# Patient Record
Sex: FEMALE | Race: WHITE | NOT HISPANIC OR LATINO | Employment: OTHER | URBAN - METROPOLITAN AREA
[De-identification: names, ages, dates, MRNs, and addresses within clinical notes are randomized per-mention and may not be internally consistent; named-entity substitution may affect disease eponyms.]

---

## 2017-01-23 ENCOUNTER — APPOINTMENT (OUTPATIENT)
Dept: LAB | Age: 82
End: 2017-01-23
Payer: MEDICARE

## 2017-01-23 ENCOUNTER — TRANSCRIBE ORDERS (OUTPATIENT)
Dept: ADMINISTRATIVE | Age: 82
End: 2017-01-23

## 2017-01-23 DIAGNOSIS — E03.9 HYPOTHYROIDISM: ICD-10-CM

## 2017-01-23 DIAGNOSIS — R53.83 OTHER FATIGUE: ICD-10-CM

## 2017-01-23 LAB
ALBUMIN SERPL BCP-MCNC: 3.5 G/DL (ref 3.5–5)
ALP SERPL-CCNC: 69 U/L (ref 46–116)
ALT SERPL W P-5'-P-CCNC: 20 U/L (ref 12–78)
ANION GAP SERPL CALCULATED.3IONS-SCNC: 7 MMOL/L (ref 4–13)
AST SERPL W P-5'-P-CCNC: 15 U/L (ref 5–45)
BASOPHILS # BLD AUTO: 0.03 THOUSANDS/ΜL (ref 0–0.1)
BASOPHILS NFR BLD AUTO: 1 % (ref 0–1)
BILIRUB SERPL-MCNC: 0.7 MG/DL (ref 0.2–1)
BUN SERPL-MCNC: 15 MG/DL (ref 5–25)
CALCIUM SERPL-MCNC: 8.7 MG/DL (ref 8.3–10.1)
CHLORIDE SERPL-SCNC: 105 MMOL/L (ref 100–108)
CO2 SERPL-SCNC: 29 MMOL/L (ref 21–32)
CREAT SERPL-MCNC: 0.88 MG/DL (ref 0.6–1.3)
EOSINOPHIL # BLD AUTO: 0.31 THOUSAND/ΜL (ref 0–0.61)
EOSINOPHIL NFR BLD AUTO: 5 % (ref 0–6)
ERYTHROCYTE [DISTWIDTH] IN BLOOD BY AUTOMATED COUNT: 12.8 % (ref 11.6–15.1)
GFR SERPL CREATININE-BSD FRML MDRD: >60 ML/MIN/1.73SQ M
GLUCOSE SERPL-MCNC: 95 MG/DL (ref 65–140)
HCT VFR BLD AUTO: 43.3 % (ref 34.8–46.1)
HGB BLD-MCNC: 14.7 G/DL (ref 11.5–15.4)
LYMPHOCYTES # BLD AUTO: 2.08 THOUSANDS/ΜL (ref 0.6–4.47)
LYMPHOCYTES NFR BLD AUTO: 36 % (ref 14–44)
MCH RBC QN AUTO: 31.7 PG (ref 26.8–34.3)
MCHC RBC AUTO-ENTMCNC: 33.9 G/DL (ref 31.4–37.4)
MCV RBC AUTO: 94 FL (ref 82–98)
MONOCYTES # BLD AUTO: 0.47 THOUSAND/ΜL (ref 0.17–1.22)
MONOCYTES NFR BLD AUTO: 8 % (ref 4–12)
NEUTROPHILS # BLD AUTO: 2.9 THOUSANDS/ΜL (ref 1.85–7.62)
NEUTS SEG NFR BLD AUTO: 50 % (ref 43–75)
NRBC BLD AUTO-RTO: 0 /100 WBCS
PLATELET # BLD AUTO: 276 THOUSANDS/UL (ref 149–390)
PMV BLD AUTO: 10.5 FL (ref 8.9–12.7)
POTASSIUM SERPL-SCNC: 4 MMOL/L (ref 3.5–5.3)
PROT SERPL-MCNC: 7 G/DL (ref 6.4–8.2)
RBC # BLD AUTO: 4.63 MILLION/UL (ref 3.81–5.12)
SODIUM SERPL-SCNC: 141 MMOL/L (ref 136–145)
TSH SERPL DL<=0.05 MIU/L-ACNC: 1.38 UIU/ML (ref 0.36–3.74)
WBC # BLD AUTO: 5.8 THOUSAND/UL (ref 4.31–10.16)

## 2017-01-23 PROCEDURE — 85025 COMPLETE CBC W/AUTO DIFF WBC: CPT

## 2017-01-23 PROCEDURE — 80053 COMPREHEN METABOLIC PANEL: CPT

## 2017-01-23 PROCEDURE — 36415 COLL VENOUS BLD VENIPUNCTURE: CPT

## 2017-01-23 PROCEDURE — 84443 ASSAY THYROID STIM HORMONE: CPT

## 2017-02-01 ENCOUNTER — ALLSCRIPTS OFFICE VISIT (OUTPATIENT)
Dept: OTHER | Facility: OTHER | Age: 82
End: 2017-02-01

## 2017-04-25 ENCOUNTER — ALLSCRIPTS OFFICE VISIT (OUTPATIENT)
Dept: OTHER | Facility: OTHER | Age: 82
End: 2017-04-25

## 2017-06-19 DIAGNOSIS — R53.83 OTHER FATIGUE: ICD-10-CM

## 2017-06-19 DIAGNOSIS — M81.0 AGE-RELATED OSTEOPOROSIS WITHOUT CURRENT PATHOLOGICAL FRACTURE: ICD-10-CM

## 2017-06-20 ENCOUNTER — TRANSCRIBE ORDERS (OUTPATIENT)
Dept: ADMINISTRATIVE | Age: 82
End: 2017-06-20

## 2017-06-20 ENCOUNTER — APPOINTMENT (OUTPATIENT)
Dept: LAB | Age: 82
End: 2017-06-20
Payer: MEDICARE

## 2017-06-20 DIAGNOSIS — M81.0 AGE-RELATED OSTEOPOROSIS WITHOUT CURRENT PATHOLOGICAL FRACTURE: ICD-10-CM

## 2017-06-20 DIAGNOSIS — R53.83 OTHER FATIGUE: ICD-10-CM

## 2017-06-20 LAB
25(OH)D3 SERPL-MCNC: 20.4 NG/ML (ref 30–100)
ALBUMIN SERPL BCP-MCNC: 3.3 G/DL (ref 3.5–5)
ALP SERPL-CCNC: 74 U/L (ref 46–116)
ALT SERPL W P-5'-P-CCNC: 19 U/L (ref 12–78)
ANION GAP SERPL CALCULATED.3IONS-SCNC: 5 MMOL/L (ref 4–13)
AST SERPL W P-5'-P-CCNC: 15 U/L (ref 5–45)
BASOPHILS # BLD AUTO: 0.02 THOUSANDS/ΜL (ref 0–0.1)
BASOPHILS NFR BLD AUTO: 0 % (ref 0–1)
BILIRUB SERPL-MCNC: 0.45 MG/DL (ref 0.2–1)
BUN SERPL-MCNC: 14 MG/DL (ref 5–25)
CALCIUM SERPL-MCNC: 8.7 MG/DL (ref 8.3–10.1)
CHLORIDE SERPL-SCNC: 108 MMOL/L (ref 100–108)
CO2 SERPL-SCNC: 29 MMOL/L (ref 21–32)
CREAT SERPL-MCNC: 0.8 MG/DL (ref 0.6–1.3)
EOSINOPHIL # BLD AUTO: 0.26 THOUSAND/ΜL (ref 0–0.61)
EOSINOPHIL NFR BLD AUTO: 4 % (ref 0–6)
ERYTHROCYTE [DISTWIDTH] IN BLOOD BY AUTOMATED COUNT: 13.1 % (ref 11.6–15.1)
GFR SERPL CREATININE-BSD FRML MDRD: >60 ML/MIN/1.73SQ M
GLUCOSE P FAST SERPL-MCNC: 96 MG/DL (ref 65–99)
HCT VFR BLD AUTO: 42.9 % (ref 34.8–46.1)
HGB BLD-MCNC: 14.5 G/DL (ref 11.5–15.4)
LYMPHOCYTES # BLD AUTO: 2.1 THOUSANDS/ΜL (ref 0.6–4.47)
LYMPHOCYTES NFR BLD AUTO: 32 % (ref 14–44)
MCH RBC QN AUTO: 32.3 PG (ref 26.8–34.3)
MCHC RBC AUTO-ENTMCNC: 33.8 G/DL (ref 31.4–37.4)
MCV RBC AUTO: 96 FL (ref 82–98)
MONOCYTES # BLD AUTO: 0.67 THOUSAND/ΜL (ref 0.17–1.22)
MONOCYTES NFR BLD AUTO: 10 % (ref 4–12)
NEUTROPHILS # BLD AUTO: 3.51 THOUSANDS/ΜL (ref 1.85–7.62)
NEUTS SEG NFR BLD AUTO: 54 % (ref 43–75)
NRBC BLD AUTO-RTO: 0 /100 WBCS
PLATELET # BLD AUTO: 265 THOUSANDS/UL (ref 149–390)
PMV BLD AUTO: 10.2 FL (ref 8.9–12.7)
POTASSIUM SERPL-SCNC: 4.4 MMOL/L (ref 3.5–5.3)
PROT SERPL-MCNC: 6.8 G/DL (ref 6.4–8.2)
RBC # BLD AUTO: 4.49 MILLION/UL (ref 3.81–5.12)
SODIUM SERPL-SCNC: 142 MMOL/L (ref 136–145)
TSH SERPL DL<=0.05 MIU/L-ACNC: 1.04 UIU/ML (ref 0.36–3.74)
WBC # BLD AUTO: 6.58 THOUSAND/UL (ref 4.31–10.16)

## 2017-06-20 PROCEDURE — 80053 COMPREHEN METABOLIC PANEL: CPT

## 2017-06-20 PROCEDURE — 82306 VITAMIN D 25 HYDROXY: CPT

## 2017-06-20 PROCEDURE — 36415 COLL VENOUS BLD VENIPUNCTURE: CPT

## 2017-06-20 PROCEDURE — 84443 ASSAY THYROID STIM HORMONE: CPT

## 2017-06-20 PROCEDURE — 85025 COMPLETE CBC W/AUTO DIFF WBC: CPT

## 2017-06-27 ENCOUNTER — ALLSCRIPTS OFFICE VISIT (OUTPATIENT)
Dept: OTHER | Facility: OTHER | Age: 82
End: 2017-06-27

## 2017-07-17 ENCOUNTER — GENERIC CONVERSION - ENCOUNTER (OUTPATIENT)
Dept: OTHER | Facility: OTHER | Age: 82
End: 2017-07-17

## 2017-08-28 ENCOUNTER — ALLSCRIPTS OFFICE VISIT (OUTPATIENT)
Dept: OTHER | Facility: OTHER | Age: 82
End: 2017-08-28

## 2017-10-24 NOTE — PROGRESS NOTES
Assessment  1  Depression with anxiety (300 4) (F41 8)  2  Tremor (781 0) (R25 1)  3  Parkinson's disease (332 0) (G20)  4  Hypothyroidism (244 9) (E03 9)  5  Lumbar radiculopathy (724 4) (M54 16)  6  Hyperlipidemia (272 4) (E78 5)  7  Arthritis (716 90) (M19 90)     #1 tremor-clinically appears to represent Parkinson's  Treatment warranted  She will begin carbidopa levodopa 25/100 twice daily and an empty stomach  She knows she may have some nausea  May need 3 times a day dosing  May need referral to dermatology  #2 low back pain with lumbar radiculopathy symptoms-clinically suspicious for lumbar stenosis  No response to physical therapy  She will use a Medrol Dosepak and if unimproved and repeat the pack  If still unimproved will need MRI of lumbar spine with preprocedure anxiolytic followed by evaluation by neurosurgery-she wants Dr Nancy Ruby  #3 depression with exacerbation-I increased her Lexapro to 10 mg daily  Number 4:15-much of this from her depression  All screening labs stable  #5 osteoporosis-had received 2 doses of IV bisphosphonates with her last dose in December 2013  Recent DEXA scan shows lumbar spine false positive and hip of -2 4  She refuses other treatment  Subsequent bone density study due November 2017 with urine for N-telopeptide  Pending results he is now agreeable to additional treatment  On supplemental vitamin D for low level  #6 low vitamin D level-continue supplementation  #7 GI symptoms-has a history of intermittent abdominal pain  CAT scan benign  All labs normal  Endoscopy normal other than gastritis-esophagitis  MRCP showed no common duct stone  Gastric emptying study very abnormal  In the past was on low-dose Reglan a PPI  This point back on a PPI and overall stable  Try not to use Reglan with her history of depression   It has increasing symptoms may be candidate for domperidone  #8 shortness of breath-only with extreme activity that she will call should the pattern change  #9 rhinitis-allergic versus perennial-uses Flonase nasal spray  #10 myalgias-resolved with DC of aromatase inhibitor  #11 breast carcinoma-[place] prior discussion  5 mammogram stable  See surgery and follow up  #12 left lobe thyroid nodule-had prior aspiration  Follow-up ultrasound shows multiple nodules as before surgery feels no other intervention is needed  She sees surgery yearly  Recent TSH normal    All other problems as per note of July 2014, December 2009, August 2000, 2002, 2004    MEDICAL REGIMEN: Medrol Dosepak-repeat if needed, carbidopa levodopa 25/100 twice daily and an empty stomach, Lexapro 10 mg daily, rare use of lorazepam 0 5 mg twice a day when necessary, Citracal D daily, vitamin D 3/2000 units daily, baby aspirin daily, omeprazole 20 mg daily, acetaminophen when necessary    Appointment in 2 months  Call if no response to steroids and we will lengthy MRI of L-spine and referral to neurosurgeon    Addendum-patient called the office on September the fifth-he stated back-leg pain improved on Medrol but now recurring  She will repeat a Medrol Dosepak  Also with some dyspepsia  This may be from the steroids  Watching carefully to make sure it is not from levodopa  Call in one week regarding status            Plan  Arthritis    · Start: MethylPREDNISolone 4 MG Oral Tablet Therapy Pack (Medrol); use as directed  Arthritis, Osteoporosis    · Start: Carbidopa-Levodopa  MG Oral Tablet; 1 by mouth at 10AM and 1 po at 8 PM    Increase generic Lexapro to 10 mg daily  Begin carbidopa levodopa-25/100-1 tablet around 10 AM and one around 8 PM   Medrol Dosepak as directed  May need to repeat  If unresponsive will need MRI with sedation an appointment with neurosurgery  Other meds to say the same  Call office if noting any chest pain or pressure with activity  Call office if noting any weakness of one arm or leg compared to the other    Call office if noting any numbness of one arm or leg compared to the other   Call office if noting any blurred or double vision     History of Present Illness  HPI: We reviewed multiple issues  Her daughter was with her today and we conversed as well  She says she is very frustrated that her new assisted living facility and is increasingly depressed  She said the other day it was so bad I took a whole Lexapro  She is unhappy with her living situation as well as her multiple medical problems as listed below  She has a long-standing history of anxiety depression and has been on low-dose SSRI  I feel at this point she does need higher dose  She is currently on Lexapro 5 mg daily and will increase to 10 mg daily  This patient denies any systemic symptoms  Specifically there has been no evidence of fever, night sweats, significant weight loss or significant decrease in appetite  is having increasing back pain  She notes pain starting in the back and radiating down the right leg to the area the knee  Denies definite numbness or tingling  She says that some days it is extreme and some days it is not  She had physical therapy with minimal response  She said when she walks with a walker at a slightly better  She clearly feels better walking hunched over  We reviewed the likely she has clinical lumbar stenosis  I made a drawing of the anatomy and she appears to understand  I recommended a brief course of oral corticosteroids watching her psychiatric history which may need to be repeated  If unresponsive she will need an MRI potential for a little neurosurgery? they prefer Dr [facility]  We went over this in detail  This is a 45 minute visit with more than 50% of the time spent counseling the patient formerly and treatment plan as well as attention questions regarding the above  As noted we fabiola the anatomy  feel her Parkinson's is becoming more of an issue  She has a tremor? predominantly right arm with slight left arm  She also some tremor of her mouth   She feels as though it is not getting in the way  I explained to her however treating this may help her mobility  I recommended she go on carbidopa levodopa 25/100 twice daily  She may need 3 times a day  He reviewed that this needs to be taken on an empty stomach  She will take a dose around 10 AM and another dose around 8 PM   has known hypothyroidism  This patient has a known history of hypothyroidism  We reviewed the difference between brand and generic thyroid supplements  We reviewed that thyroid supplements need to be taken on an empty stomach  We reviewed recent literature showing that the thyroid supplement can be taken in the morning on an empty stomach or before going to bed on an empty stomach  This patient denies any symptoms of hypothyroidism including significant constipation, dry skin or worsening fatigue  Periodic monitoring of this patient's free T4 and TSH will continue to be done  She is known osteoporosis and is potentially open to additional treatment of this in the future  denies any weakness of one arm or leg compared to the other  She denies any numbness of one arm or leg compared to the other  She denies blurred or double vision or difficulty with her speech  patient denies any systemic symptoms  Specifically there has been no evidence of fever, night sweats, significant weight loss or significant decrease in appetite  Review of Systems  Complete-Female:   Constitutional: feeling poorly-- and-- feeling tired, but-- no fever-- and-- no chills  Eyes: No complaints of eye pain, no red eyes, no eyesight problems, no discharge, no dry eyes, no itching of eyes  ENT: no complaints of earache, no loss of hearing, no nose bleeds, no nasal discharge, no sore throat, no hoarseness  Cardiovascular: No complaints of slow heart rate, no fast heart rate, no chest pain, no palpitations, no leg claudication, no lower extremity edema     Respiratory: shortness of breath-- and-- shortness of breath during exertion, but-- no cough,-- no orthopnea,-- no wheezing-- and-- no PND  Gastrointestinal: No complaints of abdominal pain, no constipation, no nausea or vomiting, no diarrhea, no bloody stools  Genitourinary: No complaints of dysuria, no incontinence, no pelvic pain, no dysmenorrhea, no vaginal discharge or bleeding  Musculoskeletal: arthralgias-- and-- Low back pain as before, but-- no joint swelling,-- no limb pain,-- no myalgias,-- no joint stiffness-- and-- no limb swelling  Integumentary: No complaints of skin rash or lesions, no itching, no skin wounds, no breast pain or lump  Neurological: Tremor, but-- no headache,-- no numbness,-- no tingling,-- no confusion,-- no dizziness,-- no limb weakness,-- no convulsions,-- no fainting-- and-- no difficulty walking  Psychiatric: sleep disturbances-- and-- depression, but-- not suicidal,-- no anxiety,-- no personality change-- and-- no emotional problems  Endocrine: No complaints of proptosis, no hot flashes, no muscle weakness, no deepening of the voice, no feelings of weakness  Hematologic/Lymphatic: No complaints of swollen glands, no swollen glands in the neck, does not bleed easily, does not bruise easily  Active Problems  1  Abdominal pain (789 00) (R10 9)  2  Anemia (285 9) (D64 9)  3  Arthritis (716 90) (M19 90)  4  Atherosclerosis (440 9) (I70 90)  5  Breast cancer (174 9) (C50 919)  6  Depression (311) (F32 9)  7  Depression with anxiety (300 4) (F41 8)  8  Dyspnea (786 09) (R06 00)  9  Esophageal reflux (530 81) (K21 9)  10  Fatigue (780 79) (R53 83)  11  Gastroparesis (536 3) (K31 84)  12  Hoarseness (784 42) (R49 0)  13  Hyperlipidemia (272 4) (E78 5)  14  Hypertension (401 9) (I10)  15  Hypothyroidism (244 9) (E03 9)  16  Lumbar radiculopathy (724 4) (M54 16)  17  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)  18  Osteoporosis (733 00) (M81 0)  19  Rhinitis (472 0) (J31 0)  20  Tremor (781 0) (R25 1)  21   Vitamin D deficiency (268 9) (E55 9)    Past Medical History  1  History of Atherosclerosis (440 9) (I70 90)  2  History of Breast Cancer (V10 3)  3  History of Gastroparesis (536 3) (K31 84)  4  History of Long term use of drug (V58 69) (Z79 899)  5  History of Malignant neoplasm of breast, unspecified laterality  6  History of Osteoarthritis (V13 4)  7  History of Osteomalacia (268 2)  Active Problems And Past Medical History Reviewed: The active problems and past medical history were reviewed and updated today  Surgical History  Surgical History Reviewed: The surgical history was reviewed and updated today  Family History  Mother   1  Family history of Atherosclerosis (V17 49)  Family History   2  Family history of Atherosclerosis (V17 49)  3  Family history of Hyperlipidemia  4  Family history of Osteoarthritis (V17 7)  Family History Reviewed: The family history was reviewed and updated today  Social History   · Alcohol Use (History)   · Being A Social Drinker   · Denied: History of Drug Use   · Never A Smoker  Social History Reviewed: The social history was reviewed and updated today  The social history was reviewed and is unchanged  Current Meds  1  Advil CAPS; TAKE 2 CAPSULE 3 times daily PRN; Therapy: (Recorded:[id number]) to Recorded  2  Aspirin 81 MG TABS; TAKE 1 TABLET DAILY; Therapy: (Recorded:35Bce1581) to Recorded  3  Escitalopram Oxalate 10 MG Oral Tablet; take 1 tablet by mouth one time daily; Therapy: [id number] to (Evaluate:29Jun2018)  Requested for: 35Dpk9924; Last Rx:[id number]   Ordered  4  LORazepam 0 5 MG Oral Tablet; TAKE 1 TABLET TWICE DAILY; Therapy: (Recorded:95Vwz7158) to Recorded  5  Omeprazole 20 MG Oral Capsule Delayed Release; TAKE 1 CAPSULE DAILY; Therapy: 27Apr2015 to (Evaluate:21Apr2016) Recorded  6  Vitamin D3 2000 UNIT Oral Capsule; TAKE 1 CAPSULE Daily; Therapy: (Recorded:68Rgz7096) to Recorded  Medication List Reviewed: The medication list was reviewed and updated today  Allergies  1  No Known Drug Allergies    Vitals  Vital Signs    Recorded: 90Lov9219 06:12PM Recorded: 28Aug2017 03:24PM   Heart Rate 68    Respiration 14    Systolic 935    Diastolic 78    Height  5 ft 3 in   Weight  168 lb    BMI Calculated  29 76   BSA Calculated  1 8     Physical Exam    Constitutional   General appearance: No acute distress, well appearing and well nourished  Eyes   Conjunctiva and lids: No swelling, erythema or discharge  Pupils and irises: Equal, round, reactive to light  Ophthalmoscopic examination: Normal fundi and optic discs  Ears, Nose, Mouth, and Throat   External inspection of ears and nose: Normal     Otoscopic examination: Tympanic membranes translucent with normal light reflex  Canals patent without erythema  Hearing: Normal     Nasal mucosa, septum, and turbinates: Normal without edema or erythema  Lips, teeth, and gums: Normal, good dentition  Oropharynx: Normal with no erythema, edema, exudate or lesions  Neck   Neck: Supple, symmetric, trachea midline, no masses  Thyroid: Normal, no thyromegaly  Pulmonary   Respiratory effort: No increased work of breathing or signs of respiratory distress  Percussion of chest: Normal     Palpation of chest: Normal     Auscultation of lungs: Clear to auscultation  Cardiovascular   Palpation of heart: Normal PMI, no thrills  Auscultation of heart: Normal rate and rhythm, normal S1 and S2, no murmurs  Carotid pulses: 2+ bilaterally  Abdominal aorta: Normal     Femoral pulses: 2+ bilaterally  Pedal pulses: 2+ bilaterally  Examination of extremities for edema and/or varicosities: Normal     Chest   Breasts: Normal, no dimpling or skin changes appreciated  Palpation of breasts and axillae: Normal, no masses palpated  Abdomen   Abdomen: Non-tender, no masses  Liver and spleen: No hepatomegaly or splenomegaly  Examination for hernias: No hernia appreciated      Anus, perineum, and rectum: Normal sphincter tone, no masses, no prolapse  Stool sample for occult blood: Negative  Genitourinary   External genitalia and vagina: Normal, no lesions appreciated  Urethra: Normal, no discharge  Bladder: Not distended, no tenderness  Cervix: Normal, no lesions  Uterus: Normal size, no tenderness, no masses  Adnexa/Parametria: Normal, no masses or tenderness  Lymphatic   Palpation of lymph nodes in neck: No lymphadenopathy  Palpation of lymph nodes in axillae: No lymphadenopathy  Palpation of lymph nodes in groin: No lymphadenopathy  Palpation of lymph nodes in other areas: No lymphadenopathy  Musculoskeletal   Gait and station: Normal     Digits and nails: Normal without clubbing or cyanosis  Joints, bones, and muscles: Normal     Range of motion: Normal     Stability: Normal     Muscle strength/tone: Normal     Skin   Skin and subcutaneous tissue: Normal without rashes or lesions  Palpation of skin and subcutaneous tissue: Normal turgor  Neurologic   Cranial nerves: Cranial nerves II-XII intact  Cortical function: Normal mental status  Reflexes: 2+ and symmetric  Sensation: No sensory loss  Coordination: Abnormal  -- Mild resting tremor?partially pill-rolling  Decreased with activity  Psychiatric   Judgment and insight: Normal     Orientation to person, place, and time: Normal     Recent and remote memory: Intact  Mood and affect: Normal        Future Appointments    Date/Time Provider Specialty Site   11/14/2017 10:00 AM ISAAC Neal   Internal Medicine [place] Estrada MD     Signatures   Electronically signed by : ISAAC Barrett ; Aug 28 2017  6:22PM EST                       (Author)    Electronically signed by : ISAAC Barrett ; Sep  6 2017  9:10AM EST                       (Author)

## 2017-10-27 ENCOUNTER — GENERIC CONVERSION - ENCOUNTER (OUTPATIENT)
Dept: OTHER | Facility: OTHER | Age: 82
End: 2017-10-27

## 2017-11-01 ENCOUNTER — GENERIC CONVERSION - ENCOUNTER (OUTPATIENT)
Dept: OTHER | Facility: OTHER | Age: 82
End: 2017-11-01

## 2017-11-14 ENCOUNTER — ALLSCRIPTS OFFICE VISIT (OUTPATIENT)
Dept: OTHER | Facility: OTHER | Age: 82
End: 2017-11-14

## 2017-11-15 NOTE — PROGRESS NOTES
Assessment    1  Depression with anxiety (300 4) (F41 8)   2  Parkinson's disease (332 0) (G20)   3  Lumbar radiculopathy (724 4) (M54 16)   4  Hypothyroidism (244 9) (E03 9)   5  Hypertension (401 9) (I10)   6  Tremor (781 0) (R25 1)  1  Parkinson's-responsive somewhat to Sinemet 25/100 b i d  on an empty stomach  She will cautiously posted t i d  of though she feels this increases or anxiety  If this is the case and she has further worsening of her anxiety she will need referral to Neurology and potential use of dopamine agonist 2  Low back pain with lumbar radiculopathy  Clinically suspicious for lumbar stenosis  No response to physical therapy  Recently took 2 rounds of steroid Dosepak with some relief  If significant ongoing symptoms will need MRI with pre procedure Valium and referral to Neurosurgery-she prefers Dr Symone Burns 3   Depression-recently exacerbated and her Lexapro was increased to 10 milligrams daily and she will continue that 5 osteoporosis-had received 2 doses of IV bisphosphonates with her last dose in December 2013  Recent DEXA scan shows lumbar spine false positive and hip of -2 4  She refuses other treatment  Subsequent bone density study due November 2017 with urine for N-telopeptide  Pending results he is now agreeable to additional treatment  On supplemental vitamin D for low level SCHEDULE REPEAT DEXA SCAN NEXT VISIT #6 low vitamin D level-continue supplementation #7 GI symptoms-has a history of intermittent abdominal pain  CAT scan benign  All labs normal  Endoscopy normal other than gastritis-esophagitis  MRCP showed no common duct stone  Gastric emptying study very abnormal  In the past was on low-dose Reglan a PPI  This point back on a PPI and overall stable  Try not to use Reglan with her history of depression   It has increasing symptoms may be candidate for domperidone #8 shortness of breath-only with extreme activity that she will call should the pattern change #9 rhinitis-allergic versus perennial-uses Flonase nasal spray #10 myalgias-resolved with DC of aromatase inhibitor #11 breast carcinoma-[place] prior discussion  5 mammogram stable  See surgery and follow up #12 left lobe thyroid nodule-had prior aspiration  Follow-up ultrasound shows multiple nodules as before surgery feels no other intervention is needed  She sees surgery yearly  Recent TSH normal  All other problems as per note of July 2014, December 2009, August 2000, 2002, 2004  MEDICAL REGIMEN: Medrol Dosepak-repeat if needed, carbidopa levodopa 25/100 3 times daily on an empty stomach, Lexapro 10 mg daily, rare use of lorazepam 0 5 mg twice a day when necessary, Citracal D daily, vitamin D 3/2000 units daily, baby aspirin daily, omeprazole 20 mg daily, acetaminophen when necessary  Evaluation in 2 months   Plan  Increase Sinemet to t i d   Okay to increase STD minutes into total of 4 doses daily  If worsening or recurrent lumbar radiculopathy will need MRI with pre procedure Valium an appointment with Neurosurgery  May require referral to Neurology regarding Parkinson's   History of Present Illness  HPI: We had a lengthy visit today  She was accompanied to the visit by her son  At prior visit she went on carbidopa level dopa 25/100 b i d  for her Parkinson's  She says her tremors definitely decreased  She had dyspepsia GI upset initially but this has resolved  However she feels she has increasing anxiety since she is on carbidopa 11 dopa  I told her this can happen  At this point we will cautiously push it to t i d  as she still has some tremor  If she notes worsening anxiety at that point I will refer her to Neurology as she then may require dopamine agonist  had significant recurrent ongoing lumbar radiculopathy  She needed 2 rounds of steroids which may have aggravated her anxiety as well   She is using a 1000 milligrams of acetaminophen in a m  and wants to increase that to b i d  which I certainly have no problems with  However if this worsens she will need radiographic study and referral to Neurosurgery-she prefers Dr Mckinley Aguila  she thinks she would not be able to do an MRI  Son feels he should try and proceed with this  We talked about taking a dose of Valium for the procedure  He has not had any leg weakness  feels as though âthe bottom fell outâ we had a long discussion today regarding the above  Her son asked me about medical marijuana  This was a 45 minutes visit  More than 50% of the time was spent answering questions and formulating a treatment plan  patient denies any systemic symptoms  Specifically there has been no evidence of fever, night sweats, significant weight loss or significant decrease in appetite  remains on her current dose of PPI  She denies frequent pyrosis  She denies difficulty swallowing  in detail the difference today between Parkinson's, depression, neurotransmitter is etc       Active Problems    1  Abdominal pain (789 00) (R10 9)   2  Anemia (285 9) (D64 9)   3  Arthritis (716 90) (M19 90)   4  Atherosclerosis (440 9) (I70 90)   5  Breast cancer (174 9) (C50 919)   6  Depression (311) (F32 9)   7  Depression with anxiety (300 4) (F41 8)   8  Dyspnea (786 09) (R06 00)   9  Esophageal reflux (530 81) (K21 9)   10  Fatigue (780 79) (R53 83)   11  Gastroparesis (536 3) (K31 84)   12  Hoarseness (784 42) (R49 0)   13  Hyperlipidemia (272 4) (E78 5)   14  Hypertension (401 9) (I10)   15  Hypothyroidism (244 9) (E03 9)   16  Lumbar radiculopathy (724 4) (M54 16)   17  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)   18  Osteoporosis (733 00) (M81 0)   19  Parkinson's disease (332 0) (G20)   20  Rhinitis (472 0) (J31 0)   21  Tremor (781 0) (R25 1)   22  Vitamin D deficiency (268 9) (E55 9)    Past Medical History    1  History of Atherosclerosis (440 9) (I70 90)   2  History of Breast Cancer (V10 3)   3  History of Gastroparesis (536 3) (K31 84)   4   History of Long term use of drug (V58 69) (Z79 899)   5  History of Malignant neoplasm of breast, unspecified laterality   6  History of Osteoarthritis (V13 4)   7  History of Osteomalacia (268 2)  Active Problems And Past Medical History Reviewed: The active problems and past medical history were reviewed and updated today  Surgical History  Surgical History Reviewed: The surgical history was reviewed and updated today  Family History  Mother    1  Family history of Atherosclerosis (V17 49)  Family History    2  Family history of Atherosclerosis (V17 49)   3  Family history of Hyperlipidemia   4  Family history of Osteoarthritis (V17 7)  Family History Reviewed: The family history was reviewed and updated today  Social History     · Alcohol Use (History)   · Being A Social Drinker   · Denied: History of Drug Use   · Never A Smoker  Social History Reviewed: The social history was reviewed and updated today  The social history was reviewed and is unchanged  Current Meds   1  Advil CAPS; TAKE 2 CAPSULE 3 times daily PRN; Therapy: (Recorded:34Rul9446) to Recorded   2  Aspirin 81 MG TABS; TAKE 1 TABLET DAILY; Therapy: (Recorded:60Cnr3100) to Recorded   3  Carbidopa-Levodopa  MG Oral Tablet; 1 by mouth at 10AM and 1 po at 8 PM; Therapy: 85Qpq8674 to (Last Rx:83Dpp4263)  Requested for: 68Nnr0245 Ordered   4  Escitalopram Oxalate 10 MG Oral Tablet; take 1 tablet by mouth one time daily; Therapy: 33TSF2714 to (Evaluate:29Jun2018)  Requested for: 19Gov1136; Last Rx:22Lpr0145 Ordered   5  LORazepam 0 5 MG Oral Tablet; TAKE 1 TABLET TWICE DAILY; Therapy: (95018 [id number]) to Recorded   6  MethylPREDNISolone 4 MG Oral Tablet Therapy Pack (Medrol); use as directed; Therapy: 19Pfi4845 to (Last Rx:81Tox3474)  Requested for: 73Hmg0952 Ordered   7  Omeprazole 20 MG Oral Capsule Delayed Release; TAKE 1 CAPSULE DAILY; Therapy: 27Apr2015 to (Evaluate:21Apr2016) Recorded   8   Vitamin D3 2000 UNIT Oral Capsule; TAKE 1 CAPSULE Daily; Therapy: (Recorded:03Ryb4999) to Recorded  Medication List Reviewed: The medication list was reviewed and updated today  Allergies  1  No Known Drug Allergies    Vitals  Vital Signs    Recorded: 04DLX2868 10:37AM Recorded: 81DAE6737 09:58AM   Heart Rate 68    Respiration 14    Systolic 569, RUE, Sitting    Diastolic 76, RUE, Sitting    Height  5 ft 3 in   Weight  163 lb 8 oz   BMI Calculated  28 96   BSA Calculated  1 78       Physical Exam   Constitutional  General appearance: No acute distress, well appearing and well nourished  Eyes  Conjunctiva and lids: No swelling, erythema or discharge  Pupils and irises: Equal, round, reactive to light  Ophthalmoscopic examination: Normal fundi and optic discs  Ears, Nose, Mouth, and Throat  External inspection of ears and nose: Normal    Otoscopic examination: Tympanic membranes translucent with normal light reflex  Canals patent without erythema  Hearing: Normal    Nasal mucosa, septum, and turbinates: Normal without edema or erythema  Lips, teeth, and gums: Normal, good dentition  Oropharynx: Normal with no erythema, edema, exudate or lesions  Neck  Neck: Supple, symmetric, trachea midline, no masses  Thyroid: Normal, no thyromegaly  Pulmonary  Respiratory effort: No increased work of breathing or signs of respiratory distress  Percussion of chest: Normal    Palpation of chest: Normal    Auscultation of lungs: Clear to auscultation  Cardiovascular  Palpation of heart: Normal PMI, no thrills  Auscultation of heart: Normal rate and rhythm, normal S1 and S2, no murmurs  Carotid pulses: 2+ bilaterally  Abdominal aorta: Normal    Femoral pulses: 2+ bilaterally  Pedal pulses: 2+ bilaterally  Examination of extremities for edema and/or varicosities: Normal    Chest  Breasts: Normal, no dimpling or skin changes appreciated  Palpation of breasts and axillae: Normal, no masses palpated  Abdomen  Abdomen: Non-tender, no masses     Liver and spleen: No hepatomegaly or splenomegaly  Examination for hernias: No hernia appreciated  Anus, perineum, and rectum: Normal sphincter tone, no masses, no prolapse  Stool sample for occult blood: Negative  Genitourinary  External genitalia and vagina: Normal, no lesions appreciated  Urethra: Normal, no discharge  Bladder: Not distended, no tenderness  Cervix: Normal, no lesions  Uterus: Normal size, no tenderness, no masses  Adnexa/Parametria: Normal, no masses or tenderness  Lymphatic  Palpation of lymph nodes in neck: No lymphadenopathy  Palpation of lymph nodes in axillae: No lymphadenopathy  Palpation of lymph nodes in groin: No lymphadenopathy  Palpation of lymph nodes in other areas: No lymphadenopathy  Musculoskeletal  Gait and station: Normal    Digits and nails: Normal without clubbing or cyanosis  Joints, bones, and muscles: Normal    Range of motion: Normal    Stability: Normal    Muscle strength/tone: Normal    Skin  Skin and subcutaneous tissue: Normal without rashes or lesions  Palpation of skin and subcutaneous tissue: Normal turgor  Neurologic  Cranial nerves: Cranial nerves II-XII intact  Cortical function: Normal mental status  Reflexes: 2+ and symmetric  Sensation: No sensory loss  Coordination: Abnormal  -- Mild resting tremorâpartially pill-rolling  Decreased with activity  Psychiatric  Judgment and insight: Normal    Orientation to person, place, and time: Normal    Recent and remote memory: Intact     Mood and affect: Normal        Signatures   Electronically signed by : ISAAC Santoyo ; Nov 14 2017 10:49AM EST                       (Author)

## 2017-12-04 ENCOUNTER — GENERIC CONVERSION - ENCOUNTER (OUTPATIENT)
Dept: INTERNAL MEDICINE CLINIC | Facility: CLINIC | Age: 82
End: 2017-12-04

## 2017-12-04 ENCOUNTER — TRANSCRIBE ORDERS (OUTPATIENT)
Dept: ADMINISTRATIVE | Facility: HOSPITAL | Age: 82
End: 2017-12-04

## 2017-12-04 DIAGNOSIS — Z12.31 VISIT FOR SCREENING MAMMOGRAM: Primary | ICD-10-CM

## 2017-12-07 ENCOUNTER — HOSPITAL ENCOUNTER (OUTPATIENT)
Dept: RADIOLOGY | Age: 82
Discharge: HOME | End: 2017-12-07
Payer: MEDICARE

## 2017-12-07 ENCOUNTER — GENERIC CONVERSION - ENCOUNTER (OUTPATIENT)
Dept: INTERNAL MEDICINE CLINIC | Facility: CLINIC | Age: 82
End: 2017-12-07

## 2017-12-07 DIAGNOSIS — Z12.31 VISIT FOR SCREENING MAMMOGRAM: ICD-10-CM

## 2017-12-07 PROCEDURE — G0202 SCR MAMMO BI INCL CAD: HCPCS

## 2018-01-13 VITALS
RESPIRATION RATE: 14 BRPM | SYSTOLIC BLOOD PRESSURE: 128 MMHG | HEIGHT: 63 IN | HEART RATE: 68 BPM | WEIGHT: 166.5 LBS | DIASTOLIC BLOOD PRESSURE: 76 MMHG | BODY MASS INDEX: 29.5 KG/M2

## 2018-01-13 VITALS
HEART RATE: 68 BPM | HEIGHT: 63 IN | WEIGHT: 166.38 LBS | BODY MASS INDEX: 29.48 KG/M2 | RESPIRATION RATE: 14 BRPM | DIASTOLIC BLOOD PRESSURE: 80 MMHG | SYSTOLIC BLOOD PRESSURE: 128 MMHG

## 2018-01-13 VITALS
BODY MASS INDEX: 29.77 KG/M2 | SYSTOLIC BLOOD PRESSURE: 128 MMHG | DIASTOLIC BLOOD PRESSURE: 78 MMHG | HEIGHT: 63 IN | HEART RATE: 68 BPM | RESPIRATION RATE: 14 BRPM | WEIGHT: 168 LBS

## 2018-01-14 VITALS
SYSTOLIC BLOOD PRESSURE: 126 MMHG | HEIGHT: 63 IN | WEIGHT: 163.5 LBS | HEART RATE: 68 BPM | DIASTOLIC BLOOD PRESSURE: 76 MMHG | RESPIRATION RATE: 14 BRPM | BODY MASS INDEX: 28.97 KG/M2

## 2018-01-14 VITALS
SYSTOLIC BLOOD PRESSURE: 130 MMHG | BODY MASS INDEX: 29.23 KG/M2 | DIASTOLIC BLOOD PRESSURE: 80 MMHG | RESPIRATION RATE: 14 BRPM | WEIGHT: 165 LBS | HEART RATE: 68 BPM | HEIGHT: 63 IN

## 2018-01-24 ENCOUNTER — OFFICE VISIT (OUTPATIENT)
Dept: INTERNAL MEDICINE CLINIC | Facility: CLINIC | Age: 83
End: 2018-01-24
Payer: MEDICARE

## 2018-01-24 ENCOUNTER — TELEPHONE (OUTPATIENT)
Dept: INTERNAL MEDICINE CLINIC | Facility: CLINIC | Age: 83
End: 2018-01-24

## 2018-01-24 VITALS
RESPIRATION RATE: 14 BRPM | BODY MASS INDEX: 29.02 KG/M2 | HEIGHT: 63 IN | SYSTOLIC BLOOD PRESSURE: 130 MMHG | DIASTOLIC BLOOD PRESSURE: 80 MMHG | HEART RATE: 72 BPM | WEIGHT: 163.8 LBS

## 2018-01-24 DIAGNOSIS — I10 HYPERTENSION, UNSPECIFIED TYPE: Primary | ICD-10-CM

## 2018-01-24 DIAGNOSIS — M48.061 SPINAL STENOSIS OF LUMBAR REGION WITHOUT NEUROGENIC CLAUDICATION: ICD-10-CM

## 2018-01-24 DIAGNOSIS — E78.5 HYPERLIPIDEMIA, UNSPECIFIED HYPERLIPIDEMIA TYPE: ICD-10-CM

## 2018-01-24 DIAGNOSIS — M54.16 LUMBAR RADICULOPATHY: ICD-10-CM

## 2018-01-24 DIAGNOSIS — F41.8 DEPRESSION WITH ANXIETY: ICD-10-CM

## 2018-01-24 DIAGNOSIS — E03.9 HYPOTHYROIDISM, UNSPECIFIED TYPE: ICD-10-CM

## 2018-01-24 DIAGNOSIS — M81.0 ENCOUNTER FOR ONGOING OSTEOPOROSIS BISPHOSPHONATE THERAPY: ICD-10-CM

## 2018-01-24 DIAGNOSIS — G20 PARKINSON'S DISEASE (HCC): ICD-10-CM

## 2018-01-24 DIAGNOSIS — Z79.83 ENCOUNTER FOR ONGOING OSTEOPOROSIS BISPHOSPHONATE THERAPY: ICD-10-CM

## 2018-01-24 DIAGNOSIS — R25.1 TREMOR: ICD-10-CM

## 2018-01-24 PROBLEM — G20.A1 PARKINSON'S DISEASE: Status: ACTIVE | Noted: 2017-08-28

## 2018-01-24 PROCEDURE — 99215 OFFICE O/P EST HI 40 MIN: CPT | Performed by: INTERNAL MEDICINE

## 2018-01-24 RX ORDER — METHYLPREDNISOLONE 8 MG/1
8 TABLET ORAL AS NEEDED
COMMUNITY
End: 2018-04-24 | Stop reason: ALTCHOICE

## 2018-01-24 RX ORDER — MELATONIN
3000 DAILY
COMMUNITY

## 2018-01-24 RX ORDER — LANOLIN ALCOHOL/MO/W.PET/CERES
1 CREAM (GRAM) TOPICAL DAILY
COMMUNITY

## 2018-01-24 RX ORDER — ACETAMINOPHEN 325 MG/1
325 TABLET ORAL
COMMUNITY

## 2018-01-24 RX ORDER — TRAMADOL HYDROCHLORIDE 50 MG/1
50 TABLET ORAL EVERY 8 HOURS PRN
Qty: 25 TABLET | Refills: 0 | Status: SHIPPED | OUTPATIENT
Start: 2018-01-24 | End: 2018-04-24 | Stop reason: ALTCHOICE

## 2018-01-24 RX ORDER — ESCITALOPRAM OXALATE 10 MG/1
10 TABLET ORAL
COMMUNITY
End: 2019-10-17 | Stop reason: SDUPTHER

## 2018-01-24 RX ORDER — ASPIRIN 81 MG/1
81 TABLET, CHEWABLE ORAL DAILY
COMMUNITY
End: 2021-03-02

## 2018-01-24 NOTE — PROGRESS NOTES
Assessment/Plan:  Obtain MRI of lumbar spine  Referral to Dr Leila Li for potential epidural   Tramadol 50 milligrams taken 2 hours before MRI  Call office if noting any chest pain or pressure with activity  Call office if noting any weakness of one arm or leg compared to the other  Call office if noting any numbness of one arm or leg compared to the other  Call office if noting any blurred or double vision    1  Low back pain-likely significant lumbar stenosis with lumbar radiculopathy  No response to physical therapy  Recently took 2 rounds of a steroid Dosepak with some relief  Because of ongoing symptoms we elected to proceed with additional valuation intervention  She will have an MRI of her lumbar spine  She will take tramadol 50 milligrams 2 hours before the MRI  She will be seen by Neurosurgery after  She requested to see Dr Candelario Brady may be candidate for epidural   She functions very well despite her age of 80  2  Parkinson's-somewhat responsive to Sinemet 25/100 b i d  an empty stomach  Now trying to use it t i d   I again offered her referral to Neurology for potential use of a dopamine agonist but she defers at present  3 osteoporosis-had received 2 doses of IV bisphosphonates with her last dose in December 2013  Recent DEXA scan shows lumbar spine false positive and hip of -2 4  She refuses other treatment  Subsequent bone density study due November 2017 with urine for N-telopeptide  Pending results he is now agreeable to additional treatment  On supplemental vitamin D for low level SCHEDULE REPEAT DEXA SCAN NEXT VISIT  #4 low vitamin D level-continue supplementation  #5 GI symptoms-has a history of intermittent abdominal pain  CAT scan benign  All labs normal  Endoscopy normal other than gastritis-esophagitis  MRCP showed no common duct stone  Gastric emptying study very abnormal  In the past was on low-dose Reglan a PPI  This point back on a PPI and overall stable   Try not to use Reglan with her history of depression  It has increasing symptoms may be candidate for domperidone watch for exacerbation with use of Sinemet  #6 shortness of breath-only with extreme activity that she will call should the pattern change  #7 rhinitis-allergic versus perennial-uses Flonase nasal spray  #8 myalgias-resolved with DC of aromatase inhibitor  #9 breast carcinoma-[place] prior discussion  5 mammogram stable  See surgery and follow up  #10 left lobe thyroid nodule-had prior aspiration  Follow-up ultrasound shows multiple nodules as before surgery feels no other intervention is needed  She sees surgery yearly  Recent TSH normal    All other problems as per note of July 2014, December 2009, August 2000, 2002, 2004    MEDICAL REGIMEN: , carbidopa levodopa 25/100 3 times daily on an empty stomach, Lexapro 10 mg daily, rare use of lorazepam 0 5 mg twice a day when necessary, Citracal D daily, vitamin D 3/2000 units daily, baby aspirin daily, omeprazole 20 mg daily, acetaminophen when necessary            No problem-specific Assessment & Plan notes found for this encounter  Diagnoses and all orders for this visit:    Hypertension, unspecified type    Spinal stenosis of lumbar region without neurogenic claudication  -     MRI lumbar spine wo contrast; Future  -     traMADol (ULTRAM) 50 mg tablet; Take 1 tablet by mouth every 8 (eight) hours as needed for moderate pain 1/2 TO 1 PO Q 8 HRS PRN    Hyperlipidemia, unspecified hyperlipidemia type  -     Comprehensive metabolic panel; Future  -     Cholesterol, total; Future  -     HDL cholesterol; Future    Hypothyroidism, unspecified type  -     CBC and differential; Future  -     TSH baseline; Future    Lumbar radiculopathy    Parkinson's disease ([facility] [address] )    Depression with anxiety    Tremor    Encounter for ongoing osteoporosis bisphosphonate therapy    Other orders  -     aspirin 81 mg chewable tablet;  Chew 81 mg daily  -     methylPREDNISolone (MEDROL) 8 MG tablet; Take 8 mg by mouth as needed  -     escitalopram (LEXAPRO) 10 mg tablet; Take 10 mg by mouth daily  -     calcium citrate-vitamin D (CITRACAL+D) 315-200 MG-UNIT per tablet; Take 1 tablet by mouth daily  -     cholecalciferol (VITAMIN D3) 1,000 units tablet; Take 2,000 Units by mouth daily  -     carbidopa-levodopa (SINEMET)  mg per tablet; Take 1 tablet by mouth 3 (three) times a day  -     acetaminophen (TYLENOL) 325 mg tablet; Take 325 mg by mouth 2 (two) times a day before breakfast and lunch          Subjective:      Patient ID: Lizzy Mohan is a 80 y o  female  She is very much bothered by her ongoing symptoms with her lumbar radiculopathy  She says she gets up for 2-3 hours every morning she has extreme pain and it is hard for her to function  We talked about additional intervention  She had previously responded to oral steroids but this was only a temporary response  She has not had any definite leg weakness  She denies any new urine or fecal incontinence  Her daughter was with her for the visit today  We had a long discussion regarding the above  This was a 40-45 minute visit  More than 50% of the time was spent counseling the patient and her daughter and formulating a treatment plan  We reviewed the anatomy in detail  Despite her age of 46 she is overall functioning very well and I feel we canProceed  She will have a 50 milligram dose of tramadol and then have an MRI  Appointment with Neurosurgery afterwards  Concerned with pharmacy use about simultaneous SSRI and tramadol and serotonin syndrome but this will only be a 1 time dose  I do not feel we need to worry about serotonin syndrome  She does not have any red flag symptoms  This patient denies any systemic symptoms  Specifically there has been no evidence of fever, night sweats, significant weight loss or significant decrease in appetite  She has known Parkinson's disease  She feels or tremors about the same    She is not interested neurology referral at this point  Her degree of tremors about the same  I watched her walk and ambulation is stable  She walks with a cane at this point  She says in the morning when she gets out of bed her ambulation is even further worse  She has a history of depression  She is stable on current dose of Lexapro  She says that her pain exacerbate this  We will continue to watch is clear fully but I do not feel she needs additional meds at present    She has a history of breast carcinoma  She wants know if there is any chance this is related to her back pain  I felt this is very unlikely  It is felt she has significant DJD with likely developing lumbar stenosis  She has known reflux  She remains on her current dose of omeprazole  She is aware this can potentially influence her bone density but she prefers to stay on her current med-in the past when she tried to go off that she felt poorly  She also has a history of an abnormal gastric emptying study  Clearly 0 ever she feels better on the PPI and we will continue this      Back Pain   Associated symptoms include weakness  Pertinent negatives include no headaches or numbness  The following portions of the patient's history were reviewed and updated as appropriate: current medications, past family history, past medical history, past social history, past surgical history and problem list     Review of Systems   Constitutional: Positive for fatigue  Musculoskeletal: Positive for back pain and gait problem  Neurological: Positive for tremors and weakness  Negative for dizziness, seizures, syncope, facial asymmetry, speech difficulty, numbness and headaches  Psychiatric/Behavioral: Positive for sleep disturbance  Negative for agitation, confusion, decreased concentration, hallucinations and self-injury  The patient is nervous/anxious  The patient is not hyperactive            Objective:     Physical Exam   Constitutional: She is oriented to person, place, and time  She appears well-developed and well-nourished  No distress  HENT:   Head: Normocephalic and atraumatic  Eyes: Conjunctivae and EOM are normal  Pupils are equal, round, and reactive to light  Right eye exhibits no discharge  Left eye exhibits no discharge  Scleral icterus is present  Neck: Normal range of motion  Neck supple  No JVD present  No tracheal deviation present  No thyromegaly present  Cardiovascular: Normal rate, regular rhythm, normal heart sounds and intact distal pulses  Exam reveals no gallop and no friction rub  No murmur heard  Pulmonary/Chest: Effort normal and breath sounds normal  No respiratory distress  She has no wheezes  She has no rales  She exhibits no tenderness  Abdominal: Soft  Bowel sounds are normal  She exhibits no distension and no mass  There is no tenderness  There is no rebound and no guarding  Musculoskeletal: Normal range of motion  She exhibits no edema or tenderness  Negative straight leg raise bilaterally  Some pain when ambulating with tendency to walk hunched over   Neurological: She is alert and oriented to person, place, and time  She has normal reflexes  She displays normal reflexes  No cranial nerve deficit  She exhibits abnormal muscle tone  Coordination normal    Pill rolling tremor right arm greater than left   Skin: Skin is warm and dry  She is not diaphoretic  Psychiatric: She has a normal mood and affect   Her behavior is normal  Judgment and thought content normal

## 2018-01-24 NOTE — PATIENT INSTRUCTIONS
MRI of lumbar spine without contrast  Tramadol 50 milligrams taken 2 hours before MRI  Appointment with Neurosurgery  Potential epidural  Obtain lab work prior to next visit without fast needed

## 2018-01-24 NOTE — TELEPHONE ENCOUNTER
Drug interaction with lexapro , shows seratonin syndrome, due to her tramadol is think okay / are you aware /     Please advise

## 2018-01-24 NOTE — LETTER
Dear lary:  I am sending for your evaluation and potential epidural Mrs Iraida Choi- this is a pleasant white female high fall for history of hypertension, hypothyroidism, Parkinson's an ongoing low back pain  Clinically she is felt to have lumbar stenosis with intermittent lumbar radiculopathy  She had 2 rounds of oral corticosteroids with some relief but has significant ongoing pain dramatically impacting her quality of life  She has several hours of pain every morning  Because of the above I have recommended an MRI of lumbar spine without contrast and formal evaluation by herself regarding potential epidural therapy        I appreciate you seeing my patient if you have any further questions please feel free to call    Sincerely,  Fadia GARCIA

## 2018-01-26 PROBLEM — R49.0 HOARSENESS: Status: ACTIVE | Noted: 2017-06-27

## 2018-01-29 ENCOUNTER — TRANSCRIBE ORDERS (OUTPATIENT)
Dept: ADMINISTRATIVE | Age: 83
End: 2018-01-29

## 2018-01-29 ENCOUNTER — HOSPITAL ENCOUNTER (OUTPATIENT)
Dept: RADIOLOGY | Age: 83
Discharge: HOME | End: 2018-01-29
Payer: MEDICARE

## 2018-01-29 DIAGNOSIS — M48.061 SPINAL STENOSIS OF LUMBAR REGION WITHOUT NEUROGENIC CLAUDICATION: ICD-10-CM

## 2018-01-29 PROCEDURE — 72148 MRI LUMBAR SPINE W/O DYE: CPT

## 2018-01-30 ENCOUNTER — TELEPHONE (OUTPATIENT)
Dept: INTERNAL MEDICINE CLINIC | Facility: CLINIC | Age: 83
End: 2018-01-30

## 2018-01-31 ENCOUNTER — DOCUMENTATION (OUTPATIENT)
Dept: INTERNAL MEDICINE CLINIC | Facility: CLINIC | Age: 83
End: 2018-01-31

## 2018-01-31 DIAGNOSIS — M54.16 LUMBAR RADICULOPATHY: Primary | ICD-10-CM

## 2018-01-31 DIAGNOSIS — M48.061 SPINAL STENOSIS OF LUMBAR REGION, UNSPECIFIED WHETHER NEUROGENIC CLAUDICATION PRESENT: ICD-10-CM

## 2018-01-31 NOTE — TELEPHONE ENCOUNTER
Please let daughter no MRI shows extreme degenerative arthritis with narrowing of the spinal canal and multiple areas of intense arthritis    Next recommendation is the same at the office visit-specifically evaluation by Dr Mckenzie-please make sure this has been arranged

## 2018-04-17 ENCOUNTER — APPOINTMENT (OUTPATIENT)
Dept: LAB | Age: 83
End: 2018-04-17
Payer: MEDICARE

## 2018-04-17 DIAGNOSIS — E78.5 HYPERLIPIDEMIA, UNSPECIFIED HYPERLIPIDEMIA TYPE: ICD-10-CM

## 2018-04-17 DIAGNOSIS — E03.9 HYPOTHYROIDISM, UNSPECIFIED TYPE: ICD-10-CM

## 2018-04-17 LAB
ALBUMIN SERPL BCP-MCNC: 3.3 G/DL (ref 3.5–5)
ALP SERPL-CCNC: 65 U/L (ref 46–116)
ALT SERPL W P-5'-P-CCNC: 7 U/L (ref 12–78)
ANION GAP SERPL CALCULATED.3IONS-SCNC: 7 MMOL/L (ref 4–13)
AST SERPL W P-5'-P-CCNC: 14 U/L (ref 5–45)
BASOPHILS # BLD AUTO: 0.02 THOUSANDS/ΜL (ref 0–0.1)
BASOPHILS NFR BLD AUTO: 0 % (ref 0–1)
BILIRUB SERPL-MCNC: 0.68 MG/DL (ref 0.2–1)
BUN SERPL-MCNC: 15 MG/DL (ref 5–25)
CALCIUM SERPL-MCNC: 9.1 MG/DL (ref 8.3–10.1)
CHLORIDE SERPL-SCNC: 108 MMOL/L (ref 100–108)
CHOLEST SERPL-MCNC: 182 MG/DL (ref 50–200)
CO2 SERPL-SCNC: 28 MMOL/L (ref 21–32)
CREAT SERPL-MCNC: 0.76 MG/DL (ref 0.6–1.3)
EOSINOPHIL # BLD AUTO: 0.27 THOUSAND/ΜL (ref 0–0.61)
EOSINOPHIL NFR BLD AUTO: 4 % (ref 0–6)
ERYTHROCYTE [DISTWIDTH] IN BLOOD BY AUTOMATED COUNT: 13.3 % (ref 11.6–15.1)
GFR SERPL CREATININE-BSD FRML MDRD: 68 ML/MIN/1.73SQ M
GLUCOSE P FAST SERPL-MCNC: 89 MG/DL (ref 65–99)
HCT VFR BLD AUTO: 43.7 % (ref 34.8–46.1)
HDLC SERPL-MCNC: 44 MG/DL (ref 40–60)
HGB BLD-MCNC: 14.3 G/DL (ref 11.5–15.4)
LYMPHOCYTES # BLD AUTO: 1.85 THOUSANDS/ΜL (ref 0.6–4.47)
LYMPHOCYTES NFR BLD AUTO: 29 % (ref 14–44)
MCH RBC QN AUTO: 32 PG (ref 26.8–34.3)
MCHC RBC AUTO-ENTMCNC: 32.7 G/DL (ref 31.4–37.4)
MCV RBC AUTO: 98 FL (ref 82–98)
MONOCYTES # BLD AUTO: 0.66 THOUSAND/ΜL (ref 0.17–1.22)
MONOCYTES NFR BLD AUTO: 10 % (ref 4–12)
NEUTROPHILS # BLD AUTO: 3.55 THOUSANDS/ΜL (ref 1.85–7.62)
NEUTS SEG NFR BLD AUTO: 57 % (ref 43–75)
NRBC BLD AUTO-RTO: 0 /100 WBCS
PLATELET # BLD AUTO: 280 THOUSANDS/UL (ref 149–390)
PMV BLD AUTO: 9.6 FL (ref 8.9–12.7)
POTASSIUM SERPL-SCNC: 4.1 MMOL/L (ref 3.5–5.3)
PROT SERPL-MCNC: 7 G/DL (ref 6.4–8.2)
RBC # BLD AUTO: 4.47 MILLION/UL (ref 3.81–5.12)
SODIUM SERPL-SCNC: 143 MMOL/L (ref 136–145)
TSH SERPL DL<=0.05 MIU/L-ACNC: 1.08 UIU/ML
WBC # BLD AUTO: 6.37 THOUSAND/UL (ref 4.31–10.16)

## 2018-04-17 PROCEDURE — 85025 COMPLETE CBC W/AUTO DIFF WBC: CPT

## 2018-04-17 PROCEDURE — 83718 ASSAY OF LIPOPROTEIN: CPT

## 2018-04-17 PROCEDURE — 84443 ASSAY THYROID STIM HORMONE: CPT

## 2018-04-17 PROCEDURE — 80053 COMPREHEN METABOLIC PANEL: CPT

## 2018-04-17 PROCEDURE — 82465 ASSAY BLD/SERUM CHOLESTEROL: CPT

## 2018-04-17 PROCEDURE — 36415 COLL VENOUS BLD VENIPUNCTURE: CPT

## 2018-04-22 PROBLEM — M54.16 LUMBAR RADICULOPATHY: Chronic | Status: ACTIVE | Noted: 2018-01-24

## 2018-04-22 PROBLEM — F41.8 DEPRESSION WITH ANXIETY: Chronic | Status: ACTIVE | Noted: 2018-01-24

## 2018-04-22 PROBLEM — I10 HYPERTENSION: Chronic | Status: ACTIVE | Noted: 2018-01-24

## 2018-04-22 PROBLEM — G20 PARKINSON'S DISEASE (HCC): Chronic | Status: ACTIVE | Noted: 2017-08-28

## 2018-04-22 PROBLEM — G20.A1 PARKINSON'S DISEASE: Chronic | Status: ACTIVE | Noted: 2017-08-28

## 2018-04-22 PROBLEM — R25.1 TREMOR: Chronic | Status: ACTIVE | Noted: 2018-01-24

## 2018-04-23 RX ORDER — LORAZEPAM 0.5 MG/1
0.5 TABLET ORAL AS NEEDED
COMMUNITY
End: 2020-01-10 | Stop reason: SDUPTHER

## 2018-04-23 RX ORDER — OMEPRAZOLE 20 MG/1
1 CAPSULE, DELAYED RELEASE ORAL DAILY
COMMUNITY
Start: 2015-04-27 | End: 2019-05-08 | Stop reason: ALTCHOICE

## 2018-04-23 RX ORDER — OMEGA-3 FATTY ACIDS/FISH OIL 300-1000MG
CAPSULE ORAL EVERY 8 HOURS
COMMUNITY
End: 2018-04-24 | Stop reason: ALTCHOICE

## 2018-04-24 ENCOUNTER — OFFICE VISIT (OUTPATIENT)
Dept: INTERNAL MEDICINE CLINIC | Facility: CLINIC | Age: 83
End: 2018-04-24
Payer: MEDICARE

## 2018-04-24 VITALS
DIASTOLIC BLOOD PRESSURE: 78 MMHG | HEART RATE: 72 BPM | RESPIRATION RATE: 14 BRPM | WEIGHT: 161.2 LBS | SYSTOLIC BLOOD PRESSURE: 120 MMHG | BODY MASS INDEX: 28.56 KG/M2 | HEIGHT: 63 IN

## 2018-04-24 DIAGNOSIS — I10 ESSENTIAL HYPERTENSION: Primary | Chronic | ICD-10-CM

## 2018-04-24 DIAGNOSIS — C50.919 MALIGNANT NEOPLASM OF FEMALE BREAST, UNSPECIFIED ESTROGEN RECEPTOR STATUS, UNSPECIFIED LATERALITY, UNSPECIFIED SITE OF BREAST (HCC): Chronic | ICD-10-CM

## 2018-04-24 DIAGNOSIS — G20 PARKINSON'S DISEASE (HCC): Chronic | ICD-10-CM

## 2018-04-24 DIAGNOSIS — F41.8 DEPRESSION WITH ANXIETY: Chronic | ICD-10-CM

## 2018-04-24 DIAGNOSIS — M54.16 LUMBAR RADICULOPATHY: Chronic | ICD-10-CM

## 2018-04-24 DIAGNOSIS — E78.2 MIXED HYPERLIPIDEMIA: Chronic | ICD-10-CM

## 2018-04-24 DIAGNOSIS — M81.0 AGE-RELATED OSTEOPOROSIS WITHOUT CURRENT PATHOLOGICAL FRACTURE: Chronic | ICD-10-CM

## 2018-04-24 PROCEDURE — 99214 OFFICE O/P EST MOD 30 MIN: CPT | Performed by: INTERNAL MEDICINE

## 2018-04-24 NOTE — PROGRESS NOTES
Assessment/Plan:  1  Lumbar stenosis-MRI shows diffuse disease with moderate to severe stenosis in diffuse facet disease  Minimally responsive to oral steroids  Has tramadol for severe pain  Saw Neurosurgery and had epidurals and overall much improved  2  General care-given prescription for new herpes zoster vaccine  3  Parkinson's-somewhat responsive to Sinemet 25/100 b i d  on an empty stomach  Now trying to use a t i d   I again offered her referral to Neurology for potential use of a dopamine agonist but she deferred  4  Osteoporosis-had received 2 doses of IV bisphosphonates through last dose on December 2013  Most recent bone density study shows lumbar spine false-positive and hip of-2 4  Refuses other treatment  Subsequent bone density study due in November 2017 and she did goal will be going now with urine for N tele peptide  She is agreeable if DEXA scan has declining urine for N tele peptide shows no further effectiveness of IV bisphosphonates to using Prolia  We talked about mechanism of action  5  Low vitamin-D level-continue supplement  6   GI symptoms  Has a history of intermittent abdominal pain  CT scan benign  All labs normal   Endoscopy normal other than gastritis-stoppage itis  MRCP showed no common duct stone  Gastric emptying study area normal   Not using Reglan with history of depression  She has increasing symptoms may be candidate for done  O-remains on omeprazole  7  Shortness of breath-only with extreme activity-monitor  8  Rhinitis-allergic versus perennial-uses Flonase nasal spray the  9  Myalgias-resolved with DC of a II ACE-inhibitor  10  Breast carcinoma-as per prior discussion  She see surgery in reference to follow up-review next visit  11  Left lobe thyroid nodule  If prior aspiration  Follow-up ultrasound shows multiple nodules in surgery feels no other intervention is needed  See surgery yearly    TSH prior to this visit normal    All other problems as per noted July 2014      MEDICAL REGIMEN:      Carbidopa levodopa-25/100 taken t i d  an empty stomach, Lexapro 10 milligrams daily, rare use of lorazepam 0 5 milligrams b i d  p r n , Citracal D daily, vitamin D3/2000 units a day, baby aspirin daily, omeprazole 20 milligrams daily, rare use of tramadol    Addendum-DEXA scan done in May 2018 shows L-spine false positive at 2 4 and hip of -2 4  Urine for N tele peptide 23-at this point she does not need other treatment  Will need repeat bone density study in urine for N tele peptide in 2 years which will be June of 2020-[facility] CHART NEXT VISIT PATIENT HAD BONE DENSITY STUDY IN MAY 2018    Appointment in several months  Scheduled for bone density study as well as urine for N tele peptide    Addendum-patient called the office on May 14th with increasing back pain-restarting tramadol as needed for severe pain and set up again with Dr Mckenzie  No problem-specific Assessment & Plan notes found for this encounter  Diagnoses and all orders for this visit:    Essential hypertension    Parkinson's disease ([facility] [address] )    Age-related osteoporosis without current pathological fracture  -     DXA bone density spine hip and pelvis; Future  -     NTX Panel, Urine    Mixed hyperlipidemia    Depression with anxiety    Malignant neoplasm of female breast, unspecified estrogen receptor status, unspecified laterality, unspecified site of breast ([address] )    Lumbar radiculopathy    Other orders  -     Discontinue: Ibuprofen (ADVIL) 200 MG CAPS; Take by mouth every 8 (eight) hours  -     LORazepam (ATIVAN) 0 5 mg tablet; Take 1 tablet by mouth 2 (two) times a day  -     omeprazole (PriLOSEC) 20 mg delayed release capsule; Take 1 capsule by mouth daily          Subjective:      Patient ID: Scott Nichole is a 80 y o  female  She was evaluated by Neurosurgery  She had several epidurals a feels significantly better    She says that she has some low back pain but overall markedly better  Reticular symptoms are markedly better as well  She was very grateful  She said up until week ago she felt poorly had multiple things to tell me  Now she states that she is feeling better over the last week  She has a history of longstanding depression but feels that is better  She remains on current dose of Lexapro-10 milligrams daily  She has some insomnia but not severe  Labs done prior to this visit showed normal TSH, HDL 44, cholesterol 182, chemistry profile normal with a creatinine of 0 76 abnormal liver enzymes other than albumin of 3 3  CBC is normal with a hemoglobin of 14 3, white count 6 37 a platelet count [id number]  As noted MRI shows scoliosis with diffuse degenerative change, annular bulging with endplate and facet hypertrophic change, multilevel stenosis as well as multilevel moderate to severe foraminal narrowing  We talked about her osteoporosis  She had been treated briefly with bisphosphonates but felt poorly and deferred other treatment  I explained to her she is at high risk for progression is a candidate for therapy with Prolia  I recommended we have repeat bone density with urine for N tele peptide and pending results we could then consider treatment with Prolia  Her daughter was with her  Her daughter came into the room we went over that together    She feels as though her Parkinson's slightly better  She knows to take her carbidopa 11 dopa on an empty stomach  She feels as though her tremors decreased  She notes some intermittent fatigue  I explained to her can't that this can come from her Parkinson's, she is trying to become more active as the weather is improving  We talked about the new herpes zoster vaccine  She was given a prescription for that  We reviewed the difference between the all vaccine in the new vaccine  This patient denies any systemic symptoms   Specifically there has been no evidence of fever, night sweats, significant weight loss or significant decrease in appetite  This was a 30 minutes visit  We went over osteoporosis in detail  More than 50% of the time was spent counseling the patient formulating a treatment plan with multiple questions answered        The following portions of the patient's history were reviewed and updated as appropriate: current medications, past family history, past medical history, past social history, past surgical history and problem list     Review of Systems   Constitutional: Negative  Respiratory: Negative  Cardiovascular: Negative  Gastrointestinal: Negative  Endocrine: Negative  Genitourinary: Negative  Musculoskeletal: Positive for back pain  Neurological: Positive for tremors  Hematological: Negative  Psychiatric/Behavioral: Positive for dysphoric mood  Objective:      /78   Pulse 72   Resp 14   Ht 5' 3" (1 6 m)   Wt 73 1 kg (161 lb 3 2 oz)   LMP  (LMP Unknown)   BMI 28 56 kg/m²          Physical Exam   Constitutional: She is oriented to person, place, and time  She appears well-developed and well-nourished  No distress  HENT:   Head: Normocephalic and atraumatic  Right Ear: External ear normal    Left Ear: External ear normal    Nose: Nose normal    Mouth/Throat: Oropharynx is clear and moist  No oropharyngeal exudate  Eyes: Conjunctivae and EOM are normal  Pupils are equal, round, and reactive to light  Right eye exhibits no discharge  Left eye exhibits no discharge  No scleral icterus  Neck: Normal range of motion  Neck supple  No JVD present  No tracheal deviation present  No thyromegaly present  Cardiovascular: Normal rate, regular rhythm and intact distal pulses  Exam reveals no gallop and no friction rub  No murmur heard  Pulmonary/Chest: Effort normal and breath sounds normal  No respiratory distress  She has no wheezes  She has no rales  She exhibits no tenderness  Abdominal: Soft   Bowel sounds are normal  She exhibits no distension and no mass  There is no tenderness  There is no rebound and no guarding  Musculoskeletal: Normal range of motion  She exhibits no edema, tenderness or deformity  Lymphadenopathy:     She has no cervical adenopathy  Neurological: She is alert and oriented to person, place, and time  She has normal reflexes  No cranial nerve deficit  She exhibits normal muscle tone  Coordination normal    Pill rolling tremor-decreased with activity   Skin: Skin is warm and dry  No rash noted  No erythema  Psychiatric: She has a normal mood and affect  Her behavior is normal  Judgment and thought content normal    Vitals reviewed

## 2018-05-07 ENCOUNTER — TRANSCRIBE ORDERS (OUTPATIENT)
Dept: RADIOLOGY | Facility: CLINIC | Age: 83
End: 2018-05-07

## 2018-05-07 DIAGNOSIS — M81.0 SENILE OSTEOPOROSIS: Primary | ICD-10-CM

## 2018-05-09 ENCOUNTER — HOSPITAL ENCOUNTER (OUTPATIENT)
Dept: RADIOLOGY | Age: 83
Discharge: HOME | End: 2018-05-09
Payer: MEDICARE

## 2018-05-09 ENCOUNTER — APPOINTMENT (OUTPATIENT)
Dept: LAB | Age: 83
End: 2018-05-09
Payer: MEDICARE

## 2018-05-09 DIAGNOSIS — M81.0 AGE-RELATED OSTEOPOROSIS WITHOUT CURRENT PATHOLOGICAL FRACTURE: Chronic | ICD-10-CM

## 2018-05-09 PROCEDURE — 82523 COLLAGEN CROSSLINKS: CPT | Performed by: INTERNAL MEDICINE

## 2018-05-09 PROCEDURE — 77080 DXA BONE DENSITY AXIAL: CPT

## 2018-05-09 PROCEDURE — 82570 ASSAY OF URINE CREATININE: CPT | Performed by: INTERNAL MEDICINE

## 2018-05-10 LAB
COLLAGEN NTX UR-SCNC: 105 NMOL BCE
COLLAGEN NTX/CREAT UR-SRTO: 23 NM BCE/MM CR (ref 0–89)
CREAT UR-MCNC: 51 MG/DL
INTERPRETIVE GUIDE:: NORMAL

## 2018-05-14 ENCOUNTER — TELEPHONE (OUTPATIENT)
Dept: INTERNAL MEDICINE CLINIC | Facility: CLINIC | Age: 83
End: 2018-05-14

## 2018-05-14 NOTE — TELEPHONE ENCOUNTER
SPOKE WITH PT AND GAVE RESULTS  SHE THEN TOLD ME THAT HER BACK PAIN IS WORSE,   STATED "IT'S KILLING ME"  PT TAKES TYLENOL 500MGS 2 PO IN THE MORNING AND EVERY ONCE IN A WHILE AT NIGHT BUT WOULD LIKE SOMETHING STRONGER  SHE SAID THAT DR MEDRANO GAVE HER INJECTIONS BUT THAT WAS A COUPLE MONTHS AGO   PLEASE ADVISE

## 2018-05-14 NOTE — TELEPHONE ENCOUNTER
----- Message from Froy Pritchard MD sent at 5/12/2018 12:20 PM EDT -----  Please call patient and let her know DEXA scan shows osteopenia but no osteoporosis and urine testing shows prior medicine still effective-no other treatment needed at this point    She will need repeat bone density study 2 years

## 2018-05-14 NOTE — TELEPHONE ENCOUNTER
She has used tramadol in the past and should use that again-get her tramadol 50 milligrams half tablet to a whole tablet every 6 hours as needed for severe pain-dispense 40 with no refill-also she needs to be seen again by Dr Mckenzie

## 2018-05-14 NOTE — TELEPHONE ENCOUNTER
SPOKE WITH PT, GAVE HER THE MESSAGE   SHE STATED THAT SHE STILL HAS SOME TRAMADOL AND DIDN'T NEED ME TO CALL ANY IN  SHE ALSO STATED THAT SHE'D GIVE DR MEDRANO'S OFFICE A CALL ABOUT GETTING AN APPT

## 2018-05-22 NOTE — TELEPHONE ENCOUNTER
PT  CALLED BACK, STATED THAT SHE'D BEEN CALLING THE WRONG NUMBER FOR DR [id number] [facility] AND JUST FOUND OUT TODAY  I CALLED AND LEFT A MESSAGE ASKING THEM TO CALL PT TO GET HER IN ASAP OR TO CALL US WITH ANY QUESTIONS   PT KNOWS TO CALL ME AGAIN IF SHE DOESN'T HEAR FROM THEM BY NEXT Thursday, I TOLD HER THAT WITH Monday BEING A HOLIDAY, I WASN'T SURE OF THEIR HOURS

## 2018-06-09 DIAGNOSIS — M48.061 SPINAL STENOSIS OF LUMBAR REGION, UNSPECIFIED WHETHER NEUROGENIC CLAUDICATION PRESENT: Primary | ICD-10-CM

## 2018-09-05 ENCOUNTER — OFFICE VISIT (OUTPATIENT)
Dept: INTERNAL MEDICINE CLINIC | Facility: CLINIC | Age: 83
End: 2018-09-05
Payer: MEDICARE

## 2018-09-05 VITALS
SYSTOLIC BLOOD PRESSURE: 118 MMHG | WEIGHT: 157 LBS | RESPIRATION RATE: 14 BRPM | BODY MASS INDEX: 27.82 KG/M2 | DIASTOLIC BLOOD PRESSURE: 78 MMHG | HEART RATE: 72 BPM | HEIGHT: 63 IN

## 2018-09-05 DIAGNOSIS — E55.9 VITAMIN D DEFICIENCY: ICD-10-CM

## 2018-09-05 DIAGNOSIS — E78.5 HYPERLIPIDEMIA, UNSPECIFIED HYPERLIPIDEMIA TYPE: Primary | ICD-10-CM

## 2018-09-05 DIAGNOSIS — G20 PARKINSON'S DISEASE (HCC): Chronic | ICD-10-CM

## 2018-09-05 DIAGNOSIS — E03.9 HYPOTHYROIDISM, UNSPECIFIED TYPE: ICD-10-CM

## 2018-09-05 DIAGNOSIS — M81.0 AGE-RELATED OSTEOPOROSIS WITHOUT CURRENT PATHOLOGICAL FRACTURE: Chronic | ICD-10-CM

## 2018-09-05 PROCEDURE — 99213 OFFICE O/P EST LOW 20 MIN: CPT | Performed by: INTERNAL MEDICINE

## 2018-09-05 PROCEDURE — G0439 PPPS, SUBSEQ VISIT: HCPCS | Performed by: INTERNAL MEDICINE

## 2018-09-05 NOTE — PROGRESS NOTES
Assessment/Plan: 1  Health maintenance-patient recently completed Shingrix vaccine   2 Lumbar stenosis-MRI shows diffuse disease with moderate to severe stenosis in diffuse facet disease  Minimally responsive to oral steroids  Has tramadol for severe pain  Saw Neurosurgery and had epidurals and overall much improve  3  Parkinson's-somewhat responsive to Sinemet 25/100 b i d  on an empty stomach  Now trying to use a t i d   I again offered her referral to Neurology for potential use of a dopamine agonist but she deferred  4  Osteoporosis-had received 2 doses of IV bisphosphonates through last dose on December 2013  Most recent bone density study shows lumbar spine false-positive and hip of-2 4  Refuses other treatment  Subsequent bone density study due in November 2017 and she did goal will be going now with urine for N tele peptide  DEXA scan done in May 2018 shows L-spine false positive 2 4 and hip of -2 4 with urine for N tele peptide at 23-holding off on other treatment at present  Subsequent bone density study in urine for N tele peptide needed in 2 years which would be June of 2020  5  Low vitamin-D level-continue supplement  6   GI symptoms  Has a history of intermittent abdominal pain  CT scan benign  All labs normal   Endoscopy normal other than gastritis-stoppage itis  MRCP showed no common duct stone  Gastric emptying study area normal   Not using Reglan with history of depression  She has increasing symptoms may be candidate for done    had been on omeprazole but remains off that  7  Shortness of breath-only with extreme activity-monitor  8  Rhinitis-allergic versus perennial-uses Flonase nasal spray the  9  Myalgias-resolved with DC of a II ACE-inhibitor  10  Breast carcinoma-as per prior discussion  She see surgery in reference to follow-up  Mammogram in December 2017 benign  Due for repeat in December 2018  11  Left lobe thyroid nodule  If prior aspiration    Follow-up ultrasound shows multiple nodules in surgery feels no other intervention is needed  See surgery yearly  TSH prior to this visit normal  12 dysthymia-stable on Lexapro 10 milligrams half tab daily     All other problems as per noted July 2014        MEDICAL REGIMEN:                                                  Carbidopa levodopa-25/100 taken t i d  an empty stomach, Lexapro 10 milligrams-1/2 tab daily daily, prior use of lorazepam Citracal D daily, vitamin D3/2000 units a day, baby aspirin daily, rare use of tramadol 50 milligrams half tablet every 8 hours as needed    Appointment over the next several months with prior CBC, chemistry profile, cholesterol, HDL and vitamin-D level     No problem-specific Assessment & Plan notes found for this encounter  Diagnoses and all orders for this visit:    Hyperlipidemia, unspecified hyperlipidemia type  -     CBC and differential; Future  -     Comprehensive metabolic panel; Future  -     Cholesterol, total; Future  -     HDL cholesterol; Future  -     TSH, 3rd generation; Future  -     Vitamin D 25 hydroxy; Future    Hypothyroidism, unspecified type  -     CBC and differential; Future  -     Comprehensive metabolic panel; Future  -     Cholesterol, total; Future  -     HDL cholesterol; Future  -     TSH, 3rd generation; Future  -     Vitamin D 25 hydroxy; Future    Vitamin D deficiency  -     CBC and differential; Future  -     Comprehensive metabolic panel; Future  -     Cholesterol, total; Future  -     HDL cholesterol; Future  -     TSH, 3rd generation; Future  -     Vitamin D 25 hydroxy; Future    Age-related osteoporosis without current pathological fracture    [facility] ([facility] )          Subjective:      Patient ID: Dave Castellon is a 80 y o  female  She was here for her Medicare well visit in wanted to go over couple of other issues  We talked in detail about her Parkinson's  She feels she may have occasional right leg symptoms now    She is on carbidopa-levodopa 25/100 t i d  on an empty stomach  I offered her potential 4 times a day dosing but she deferred--I offered her referral again to Neurology for potential addition of dopamine agonist but she also deferred in that regard  In reference to her depression she feels stable Lexapro 5 milligrams daily  She feels higher dose is sedating  She had a follow-up bone density study done in May 2018 showing L-spine false positive 2 4 and hip of -2 4  Urine for N tele peptide was 23  At this point we are holding off on additional treatment  She will need subsequent bone density study in 2 years which will be June of 2020  She has already received the 2 doses of new zoster vaccine  We had a long discussion today regarding her current living situation  She wants to basically keep her meds the same and see how she does  The following portions of the patient's history were reviewed and updated as appropriate: current medications, past family history, past medical history, past social history, past surgical history and problem list     Review of Systems   Constitutional: Positive for fatigue  Respiratory: Negative  Cardiovascular: Negative  Gastrointestinal: Negative  Endocrine: Negative  Genitourinary: Negative  Musculoskeletal: Negative  Neurological: Positive for tremors  Hematological: Negative  Psychiatric/Behavioral: Positive for dysphoric mood  Objective:      /78   Pulse 72   Resp 14   Ht 5' 3" (1 6 m)   Wt 71 2 kg (157 lb)   LMP  (LMP Unknown)   BMI 27 81 kg/m²          Physical Exam   Constitutional: She is oriented to person, place, and time  She appears well-developed and well-nourished  No distress  HENT:   Head: Normocephalic and atraumatic  Right Ear: External ear normal    Left Ear: External ear normal    Nose: Nose normal    Mouth/Throat: Oropharynx is clear and moist  No oropharyngeal exudate     Eyes: Conjunctivae and EOM are normal  Pupils are equal, round, and reactive to light  Right eye exhibits no discharge  Left eye exhibits no discharge  No scleral icterus  Neck: Normal range of motion  Neck supple  No JVD present  No tracheal deviation present  No thyromegaly present  Cardiovascular: Normal rate, regular rhythm, normal heart sounds and intact distal pulses  Exam reveals no gallop and no friction rub  No murmur heard  Pulmonary/Chest: Effort normal and breath sounds normal  No respiratory distress  She has no wheezes  She has no rales  She exhibits no tenderness  Abdominal: Soft  Bowel sounds are normal  She exhibits no distension and no mass  There is no tenderness  There is no rebound and no guarding  Genitourinary:   Genitourinary Comments: Evidence of prior left mastectomy   Musculoskeletal: Normal range of motion  She exhibits no edema or deformity  Lymphadenopathy:     She has no cervical adenopathy  Neurological: She is alert and oriented to person, place, and time  She has normal reflexes  No cranial nerve deficit  She exhibits normal muscle tone  Coordination normal    Prominent pill rolling tremor of the right hand   Skin: Skin is warm and dry  No rash noted  No erythema  Psychiatric: She has a normal mood and affect   Her behavior is normal  Judgment and thought content normal

## 2018-09-05 NOTE — PROGRESS NOTES
Assessment and Plan:    Problem List Items Addressed This Visit     None        Health Maintenance Due   Topic Date Due    DTaP,Tdap,and Td Vaccines (1 - Tdap) 07/29/1946    Fall Risk  07/29/1990    Urinary Incontinence Screening  07/29/1990    Pneumococcal PPSV23/PCV13 65+ Years / High and Highest Risk (2 of 2 - PPSV23) 02/02/2016    INFLUENZA VACCINE  09/01/2018         HPI:  Pawan Pagan is a 80 y o  female here for her Subsequent Wellness Visit  Patient Active Problem List   Diagnosis    Depression with anxiety    Lumbar radiculopathy    [facility]'s disease ([facility] )    Tremor    Hypertension    Hyperlipidemia    Encounter for ongoing osteoporosis bisphosphonate therapy    Abdominal pain    Anemia    Arthritis    Atherosclerosis    Breast cancer ([facility] )    Depression    Dyspnea    Esophageal reflux    Fatigue    Gastroparesis    Hoarseness    Hypothyroidism    Osteoporosis    Nontoxic single thyroid nodule    Vitamin D deficiency     Past Medical History:   Diagnosis Date    Gastroparesis     History of atherosclerosis     History of breast cancer     History of osteoarthritis     Long term use of drug     Malignant neoplasm of breast ([facility] )     Unspecified laterality    Osteomalacia      No past surgical history on file    Family History   Problem Relation Age of Onset    Coronary artery disease Mother     Coronary artery disease Family     Hyperlipidemia Family     Osteoarthritis Family      History   Smoking Status    Never Smoker   Smokeless Tobacco    Not on file     History   Alcohol use Not on file     Comment: Social drinker      History   Drug Use No       Current Outpatient Prescriptions   Medication Sig Dispense Refill    acetaminophen (TYLENOL) 325 mg tablet Take 325 mg by mouth 2 (two) times a day before breakfast and lunch      aspirin 81 mg chewable tablet Chew 81 mg daily      calcium citrate-vitamin D (CITRACAL+D) 315-200 MG-UNIT per tablet Take 1 tablet by mouth daily      carbidopa-levodopa (SINEMET)  mg per tablet Take 1 tablet by mouth 3 (three) times a day      cholecalciferol (VITAMIN D3) 1,000 units tablet Take 2,000 Units by mouth daily      escitalopram (LEXAPRO) 10 mg tablet Take 10 mg by mouth daily      LORazepam (ATIVAN) 0 5 mg tablet Take 1 tablet by mouth 2 (two) times a day      omeprazole (PriLOSEC) 20 mg delayed release capsule Take 1 capsule by mouth daily       No current facility-administered medications for this visit  No Known Allergies  Immunization History   Administered Date(s) Administered    Influenza 10/21/2014, 09/30/2015, 09/20/2016, 11/14/2017    Influenza Quadrivalent Preservative Free 3 years and older IM 10/21/2014    Influenza Split High Dose Preservative Free IM 09/30/2015, 09/20/2016, 11/14/2017    Pneumococcal Conjugate 13-Valent 12/08/2015    Zoster 03/04/2013, 05/17/2018       Patient Care Team:  Alondra Serra MD as PCP - General    Medicare Screening Tests and Risk Assessments:  Ree Molina is here for her Subsequent Wellness visit  Health Risk Assessment:  Patient rates overall health as good  Patient feels that their physical health rating is Slightly worse  Eyesight was rated as Same  Hearing was rated as Slightly worse  Patient feels that their emotional and mental health rating is Slightly worse  Pain experienced by patient in the last 7 days has been A lot  Patient's pain rating has been 6/10  Patient states that she has experienced weight loss or gain in last 6 months  (Additional comments:  BACK PAIN, MORE PAINFUL IN THE MORNING )    Emotional/Mental Health:    PHQ-9 Depression Screening:    Frequency of the following problems over the past two weeks:      1  Little interest or pleasure in doing things: 1 - several days      2  Feeling down, depressed, or hopeless: 1 - several days      3  Trouble falling or staying asleep, or sleeping too much: 0 - not at all      4   Feeling tired or having little energy: 1 - several days      5  Poor appetite or overeatin - not at all      6  Feeling bad about yourself - or that you are a failure or have let yourself or your family down: 0 - not at all      7  Trouble concentrating on things, such as reading the newspaper or watching television: 1 - several days      8  Moving or speaking so slowly that other people could have noticed  Or the opposite - being so fidgety or restless that you have been moving around a lot more than usual: 0 - not at all      9  Thoughts that you would be better off dead, or of hurting yourself in some way: 0 - not at all  PHQ-2 Score: 2  PHQ-9 Score: 6    Broken Bones/Falls: Fall Risk Assessment:    In the past year, patient has experienced: No history of falling in past year          Bladder/Bowel:  Patient has leaked urine accidently in the last six months  Patient reports no loss of bowel control  Immunizations:  Patient has had a flu vaccination within the last year  Patient has received a pneumonia shot  Patient has received a shingles shot  Patient has not received tetanus/diphtheria shot  Home Safety:  Patient does not have trouble with stairs inside or outside of their home  Patient currently reports that there are no safety hazards present in home, working smoke alarms, working carbon monoxide detectors  Preventative Screenings:   Breast cancer screening performed, no colon cancer screen completed, no cholesterol screen completed, no glaucoma eye exam completed    Nutrition:  Current diet: Low Saturated Fat and No Added Salt with servings of the following:    Medications:  Patient is currently taking over-the-counter supplements  List of OTC medications includes: VITAMINS, TYNENOL BABY ASPIRIN  Patient is able to manage medications  Lifestyle Choices:  Patient reports no tobacco use  Patient has not smoked or used tobacco in the past   Patient reports alcohol use          Alcohol use per week: 1-2 WEEKLY   Patient drives a vehicle  Patient wears seat belt  Activities of Daily Living:  Can get out of bed by his or her self, able to dress self, unable to make own meals, able to do own shopping, able to bathe self, unable to do laundry/housekeeping, can manage own money, pay bills and track expensesAdditional Comments: PT LIVES AT [facility]       Previous Hospitalizations:  No hospitalization or ED visit in past 12 months  Additional Comments: No per pt     Advanced Directives:  Patient has decided on a power of   Patient has spoken to designated power of   Patient has completed advanced directive  Additional Comments: Patient was made aware to bring copy to office    Preventative Screening/Counseling:      Cardiovascular:      General: Screening Current and Risks and Benefits Discussed          Diabetes:      General: Risks and Benefits Discussed and Screening Current          Colorectal Cancer:      General: Risks and Benefits Discussed      Comments: Patient declines further studies because of age        Breast Cancer:      General: Risks and Benefits Discussed and Screening Current      Comments: Has yearly mammogram with her history of breast cancer        Cervical Cancer:      General: Screening Not Indicated          Osteoporosis:      General: Risks and Benefits Discussed and Screening Current      Comments: Has periodic bone density study with known osteoporosis        AAA:      Family history of abdominal aortic aneurysm  General: Risks and Benefits Discussed and Screening Current          Glaucoma:      General: Risks and Benefits Discussed and Screening Current          HIV:      General: Screening Not Indicated          Hepatitis C:      General: Screening Not Indicated        Advanced Directives:   Patient has living will for healthcare, has durable POA for healthcare, patient has an advanced directive  Information on ACP and/or AD not provided   No 5 wishes given  End of life assessment reviewed with patient  Immunizations:  Patient reviewed and up to date      Pneumococcal: Risks & Benefits Discussed and Lifetime Vaccine Completed      Hepatitis B (Low risk patients): Series Not Indicated      Zostavax: Risks & Benefits Discussed and Zostavax Vaccine UTD      TD: Risks & Benefits Discussed and Td Vaccine UTD      TDAP: Risks & Benefits Discussed and Tdap Vaccine UTD      Other Preventative Counseling (Non-Medicare):   Fall Prevention      Referrals:  Referral(s) to: Neurology  Additional Comments: Patient declines neurology referral regarding her history of Parkinson's

## 2018-09-06 ENCOUNTER — TELEPHONE (OUTPATIENT)
Dept: INTERNAL MEDICINE CLINIC | Facility: CLINIC | Age: 83
End: 2018-09-06

## 2018-09-06 NOTE — TELEPHONE ENCOUNTER
----- Message from Mary Esparza MD sent at 9/5/2018  2:13 PM EDT -----  Patient is here for appointment on September 5th and we went over the meds-please make sure she is also taking Citracal D -1 dose daily as well as vitamin D3/2000 units a day

## 2018-10-20 ENCOUNTER — IMMUNIZATION (OUTPATIENT)
Dept: INTERNAL MEDICINE CLINIC | Facility: CLINIC | Age: 83
End: 2018-10-20
Payer: MEDICARE

## 2018-10-20 DIAGNOSIS — Z23 ENCOUNTER FOR IMMUNIZATION: ICD-10-CM

## 2018-10-20 PROCEDURE — 90662 IIV NO PRSV INCREASED AG IM: CPT

## 2018-10-20 PROCEDURE — G0008 ADMIN INFLUENZA VIRUS VAC: HCPCS

## 2018-12-13 ENCOUNTER — HOSPITAL ENCOUNTER (OUTPATIENT)
Dept: RADIOLOGY | Age: 83
Discharge: HOME | End: 2018-12-13
Payer: MEDICARE

## 2018-12-13 VITALS — HEIGHT: 63 IN | BODY MASS INDEX: 27.82 KG/M2 | WEIGHT: 157 LBS

## 2018-12-13 DIAGNOSIS — Z12.31 ENCOUNTER FOR SCREENING MAMMOGRAM FOR BREAST CANCER: Primary | ICD-10-CM

## 2018-12-13 DIAGNOSIS — Z12.31 ENCOUNTER FOR SCREENING MAMMOGRAM FOR MALIGNANT NEOPLASM OF BREAST: ICD-10-CM

## 2018-12-13 DIAGNOSIS — Z12.31 ENCOUNTER FOR SCREENING MAMMOGRAM FOR BREAST CANCER: ICD-10-CM

## 2018-12-13 PROCEDURE — 77067 SCR MAMMO BI INCL CAD: CPT

## 2018-12-19 ENCOUNTER — APPOINTMENT (OUTPATIENT)
Dept: LAB | Age: 83
End: 2018-12-19
Payer: MEDICARE

## 2018-12-19 ENCOUNTER — TRANSCRIBE ORDERS (OUTPATIENT)
Dept: ADMINISTRATIVE | Age: 83
End: 2018-12-19

## 2018-12-19 DIAGNOSIS — E03.9 HYPOTHYROIDISM, UNSPECIFIED TYPE: ICD-10-CM

## 2018-12-19 DIAGNOSIS — E78.5 HYPERLIPIDEMIA, UNSPECIFIED HYPERLIPIDEMIA TYPE: ICD-10-CM

## 2018-12-19 DIAGNOSIS — E55.9 VITAMIN D DEFICIENCY: ICD-10-CM

## 2018-12-19 LAB
25(OH)D3 SERPL-MCNC: 26.8 NG/ML (ref 30–100)
ALBUMIN SERPL BCP-MCNC: 3.5 G/DL (ref 3.5–5)
ALP SERPL-CCNC: 65 U/L (ref 46–116)
ALT SERPL W P-5'-P-CCNC: 7 U/L (ref 12–78)
ANION GAP SERPL CALCULATED.3IONS-SCNC: 4 MMOL/L (ref 4–13)
AST SERPL W P-5'-P-CCNC: 14 U/L (ref 5–45)
BASOPHILS # BLD AUTO: 0.03 THOUSANDS/ΜL (ref 0–0.1)
BASOPHILS NFR BLD AUTO: 1 % (ref 0–1)
BILIRUB SERPL-MCNC: 0.52 MG/DL (ref 0.2–1)
BUN SERPL-MCNC: 14 MG/DL (ref 5–25)
CALCIUM SERPL-MCNC: 8.9 MG/DL (ref 8.3–10.1)
CHLORIDE SERPL-SCNC: 108 MMOL/L (ref 100–108)
CHOLEST SERPL-MCNC: 196 MG/DL (ref 50–200)
CO2 SERPL-SCNC: 29 MMOL/L (ref 21–32)
CREAT SERPL-MCNC: 0.75 MG/DL (ref 0.6–1.3)
EOSINOPHIL # BLD AUTO: 0.23 THOUSAND/ΜL (ref 0–0.61)
EOSINOPHIL NFR BLD AUTO: 4 % (ref 0–6)
ERYTHROCYTE [DISTWIDTH] IN BLOOD BY AUTOMATED COUNT: 12.7 % (ref 11.6–15.1)
GFR SERPL CREATININE-BSD FRML MDRD: 69 ML/MIN/1.73SQ M
GLUCOSE P FAST SERPL-MCNC: 82 MG/DL (ref 65–99)
HCT VFR BLD AUTO: 43.3 % (ref 34.8–46.1)
HDLC SERPL-MCNC: 44 MG/DL (ref 40–60)
HGB BLD-MCNC: 14.5 G/DL (ref 11.5–15.4)
IMM GRANULOCYTES # BLD AUTO: 0.01 THOUSAND/UL (ref 0–0.2)
IMM GRANULOCYTES NFR BLD AUTO: 0 % (ref 0–2)
LYMPHOCYTES # BLD AUTO: 1.54 THOUSANDS/ΜL (ref 0.6–4.47)
LYMPHOCYTES NFR BLD AUTO: 26 % (ref 14–44)
MCH RBC QN AUTO: 32.7 PG (ref 26.8–34.3)
MCHC RBC AUTO-ENTMCNC: 33.5 G/DL (ref 31.4–37.4)
MCV RBC AUTO: 98 FL (ref 82–98)
MONOCYTES # BLD AUTO: 0.52 THOUSAND/ΜL (ref 0.17–1.22)
MONOCYTES NFR BLD AUTO: 9 % (ref 4–12)
NEUTROPHILS # BLD AUTO: 3.7 THOUSANDS/ΜL (ref 1.85–7.62)
NEUTS SEG NFR BLD AUTO: 60 % (ref 43–75)
NRBC BLD AUTO-RTO: 0 /100 WBCS
PLATELET # BLD AUTO: 265 THOUSANDS/UL (ref 149–390)
PMV BLD AUTO: 10.3 FL (ref 8.9–12.7)
POTASSIUM SERPL-SCNC: 4.2 MMOL/L (ref 3.5–5.3)
PROT SERPL-MCNC: 6.9 G/DL (ref 6.4–8.2)
RBC # BLD AUTO: 4.43 MILLION/UL (ref 3.81–5.12)
SODIUM SERPL-SCNC: 141 MMOL/L (ref 136–145)
TSH SERPL DL<=0.05 MIU/L-ACNC: 1.04 UIU/ML (ref 0.36–3.74)
WBC # BLD AUTO: 6.03 THOUSAND/UL (ref 4.31–10.16)

## 2018-12-19 PROCEDURE — 36415 COLL VENOUS BLD VENIPUNCTURE: CPT

## 2018-12-19 PROCEDURE — 84443 ASSAY THYROID STIM HORMONE: CPT

## 2018-12-19 PROCEDURE — 82465 ASSAY BLD/SERUM CHOLESTEROL: CPT

## 2018-12-19 PROCEDURE — 80053 COMPREHEN METABOLIC PANEL: CPT

## 2018-12-19 PROCEDURE — 85025 COMPLETE CBC W/AUTO DIFF WBC: CPT

## 2018-12-19 PROCEDURE — 83718 ASSAY OF LIPOPROTEIN: CPT

## 2018-12-19 PROCEDURE — 82306 VITAMIN D 25 HYDROXY: CPT

## 2018-12-31 ENCOUNTER — OFFICE VISIT (OUTPATIENT)
Dept: INTERNAL MEDICINE CLINIC | Facility: CLINIC | Age: 83
End: 2018-12-31
Payer: MEDICARE

## 2018-12-31 VITALS
SYSTOLIC BLOOD PRESSURE: 130 MMHG | WEIGHT: 155 LBS | BODY MASS INDEX: 27.46 KG/M2 | DIASTOLIC BLOOD PRESSURE: 80 MMHG | RESPIRATION RATE: 14 BRPM | HEIGHT: 63 IN | HEART RATE: 72 BPM

## 2018-12-31 DIAGNOSIS — M54.16 LUMBAR RADICULOPATHY: Chronic | ICD-10-CM

## 2018-12-31 DIAGNOSIS — M81.0 AGE-RELATED OSTEOPOROSIS WITHOUT CURRENT PATHOLOGICAL FRACTURE: Chronic | ICD-10-CM

## 2018-12-31 DIAGNOSIS — E55.9 VITAMIN D DEFICIENCY: ICD-10-CM

## 2018-12-31 DIAGNOSIS — E78.5 HYPERLIPIDEMIA, UNSPECIFIED HYPERLIPIDEMIA TYPE: Chronic | ICD-10-CM

## 2018-12-31 DIAGNOSIS — G20 PARKINSON'S DISEASE (HCC): Primary | Chronic | ICD-10-CM

## 2018-12-31 PROCEDURE — 99214 OFFICE O/P EST MOD 30 MIN: CPT | Performed by: INTERNAL MEDICINE

## 2018-12-31 NOTE — PROGRESS NOTES
Assessment/Plan:  1  Health maintenance-reviewed her preferred analgesic agent is acetaminophen  Shingrix vaccine   2 Lumbar stenosis-MRI shows diffuse disease with moderate to severe stenosis in diffuse facet disease   Minimally responsive to oral steroids   Has tramadol for severe pain   Saw Neurosurgery and had epidurals and overall much improve-feels as though she had significant benefit from her most recent epidural  3   Parkinson's-somewhat responsive to Sinemet 25/100 b i d  on an empty stomach   Now trying to use a t i d  Miladis Zuniga again offered her referral to Neurology for potential use of a dopamine agonist but she deferred-watching closely  4   Osteoporosis-had received 2 doses of IV bisphosphonates through last dose on December 2013   Most recent bone density study shows lumbar spine false-positive and hip of-2 4   Refuses other treatment   Subsequent bone density study due in November 2017 and she did goal will be going now with urine for N tele peptide    DEXA scan done in May 2018 shows L-spine false positive 2 4 and hip of -2 4 with urine for N tele peptide at 23-holding off on other treatment at present    Subsequent bone density study in urine for N tele peptide needed in 2 years which would be June of 2020-SCHEDULE NEXT VISIT  5   Low vitamin-D level-continue supplement-she is currently on 2000 units daily will increase 3000 units 6   GI symptoms   Has a history of intermittent abdominal pain   CT scan benign   All labs normal   Endoscopy normal other than gastritis-stoppage itis   MRCP showed no common duct stone   Gastric emptying study area normal   Not using Reglan with history of depression   She has increasing symptoms may be candidate for done    had been on omeprazole but remains off that  7   Shortness of breath-only with extreme activity-monitor  8   Rhinitis-allergic versus perennial-uses Flonase nasal spray the  9 a   Breast carcinoma-as per prior discussion   She see surgery in reference to follow-up  Mammogram in December 2017 benign  mammogram in December 2018 benign  11   Left lobe thyroid nodule   If prior aspiration   Follow-up ultrasound shows multiple nodules in surgery feels no other intervention is needed   See surgery yearly  Briana Rodriguez prior to this visit normal  12 dysthymia-stable on Lexapro 10 milligrams half tab daily     All other problems as per noted July 2014        MEDICAL REGIMEN:                                                  [id number] levodopa-25/100 taken t i d  an empty stomach, Lexapro 10 milligrams-1/2 tab daily daily, prior use of lorazepam Citracal D daily, vitamin D3/2000 units a day, baby aspirin daily, rare use of tramadol 50 milligrams half tablet every 8 hours as needed    Appointment in several months without labs  No problem-specific Assessment & Plan notes found for this encounter  Diagnoses and all orders for this visit:    Parkinson's disease ([facility] )    Age-related osteoporosis without current pathological fracture    Lumbar radiculopathy    Hyperlipidemia, unspecified hyperlipidemia type    Vitamin D deficiency          Subjective:      Patient ID: Sebastian Diaz is a 80 y o  female  She feels as though her tremors improved on higher dose of carbidopa 11 dopa  She knows to take it on an empty stomach  At last visit we talked about referral to Neurology and potential use of dopamine agonist but she deferred  She feels as though it is overall improved  She feels as though it does not get in the way of daily activities  She wanted to know about other issues that can be associated with Parkinson's including psychiatric disease, fatigue, sleep abnormalities etc   We reviewed these  She had laboratory testing done prior to this visit  This was reviewed in detail in showed vitamin-D level borderline at 27, TSH normal, HDL 44 cholesterol 196 calculated non HDL is 152 chemistry profile normal   CBC is normal     She has known osteoporosis    This patient has a known history of osteopenis-osteoporosis  We reviewed the meaning of T-scores including a T score of -1 to -2 5 representing osteopenia, and -2 5 or lower representing osteoporosis  We reviewed the value of vitamin D supplementation  We reviewed the recent literature showing a higher incidence of coronary artery disease in women using more than 1500 mg a day of calcium or more  We reviewed her current literature is functional eating as to the necessity and amount of calcium supplementation  We discussed the means for monitoring severity of osteopenia-osteoporosis with DEXA scan and periodic testing of the urine for N-telopeptide  We discussed the current literature regarding length of duration of bisphosphonate use  Subsequent bone density studies due in June of 2020    She has a history of depression-this diarrhea  She is relatively stable on current dose of Lexapro  As noted she lives in an independent living facility  She feels overall she is relatively stable  She tries to stay active in various activities  She understands the importance of exercise  As noted she is now age 80  She has intermittent low back pain with lumbar radicular symptoms as before  She says this is not been a major issue and she has noted significant relief since her most recent epidural         The following portions of the patient's history were reviewed and updated as appropriate: allergies, current medications, past family history, past medical history, past social history, past surgical history and problem list a  Review of Systems   Constitutional: Negative  Respiratory: Negative  Cardiovascular: Negative  Gastrointestinal: Negative  Endocrine: Negative  Genitourinary: Negative  Musculoskeletal: Negative  Neurological: Positive for tremors  Hematological: Negative  Psychiatric/Behavioral: Positive for dysphoric mood           Objective:      Ht 5' 3" (1 6 m)   Wt 70 3 kg (155 lb) LMP  (LMP Unknown)   BMI 27 46 kg/m²          Physical Exam   Constitutional: She is oriented to person, place, and time  She appears well-developed and well-nourished  No distress  HENT:   Head: Normocephalic and atraumatic  Right Ear: External ear normal    Left Ear: External ear normal    Nose: Nose normal    Mouth/Throat: Oropharynx is clear and moist  No oropharyngeal exudate  Eyes: Pupils are equal, round, and reactive to light  Conjunctivae and EOM are normal  Right eye exhibits no discharge  Left eye exhibits no discharge  No scleral icterus  Neck: Normal range of motion  Neck supple  No JVD present  No tracheal deviation present  No thyromegaly present  Cardiovascular: Normal rate, regular rhythm, normal heart sounds and intact distal pulses  Exam reveals no gallop and no friction rub  No murmur heard  Pulmonary/Chest: Effort normal and breath sounds normal  No stridor  No respiratory distress  She has no wheezes  She has no rales  She exhibits no tenderness  Abdominal: Soft  Bowel sounds are normal  She exhibits no distension and no mass  There is no tenderness  There is no rebound and no guarding  Musculoskeletal: Normal range of motion  She exhibits no edema or deformity  Lymphadenopathy:     She has no cervical adenopathy  Neurological: She is alert and oriented to person, place, and time  She has normal reflexes  No cranial nerve deficit  She exhibits normal muscle tone  Coordination normal    Resting pill rolling tremor of the right arm  Some bradykinesia  Skin: Skin is warm and dry  No rash noted  No erythema  Psychiatric: She has a normal mood and affect  Her behavior is normal  Judgment and thought content normal    Vitals reviewed

## 2019-05-08 ENCOUNTER — OFFICE VISIT (OUTPATIENT)
Dept: INTERNAL MEDICINE CLINIC | Facility: CLINIC | Age: 84
End: 2019-05-08
Payer: MEDICARE

## 2019-05-08 VITALS
HEIGHT: 63 IN | DIASTOLIC BLOOD PRESSURE: 78 MMHG | RESPIRATION RATE: 14 BRPM | SYSTOLIC BLOOD PRESSURE: 120 MMHG | HEART RATE: 72 BPM | WEIGHT: 155 LBS | BODY MASS INDEX: 27.46 KG/M2

## 2019-05-08 DIAGNOSIS — G20 PARKINSON'S DISEASE (HCC): Primary | Chronic | ICD-10-CM

## 2019-05-08 DIAGNOSIS — R53.83 FATIGUE, UNSPECIFIED TYPE: ICD-10-CM

## 2019-05-08 PROCEDURE — 99215 OFFICE O/P EST HI 40 MIN: CPT | Performed by: INTERNAL MEDICINE

## 2019-05-08 RX ORDER — CHLORHEXIDINE GLUCONATE 0.12 MG/ML
RINSE ORAL
Refills: 0 | COMMUNITY
Start: 2019-03-06

## 2019-06-21 ENCOUNTER — CONSULT (OUTPATIENT)
Dept: NEUROLOGY | Facility: CLINIC | Age: 84
End: 2019-06-21
Payer: MEDICARE

## 2019-06-21 VITALS
HEIGHT: 63 IN | BODY MASS INDEX: 27.29 KG/M2 | DIASTOLIC BLOOD PRESSURE: 74 MMHG | WEIGHT: 154 LBS | SYSTOLIC BLOOD PRESSURE: 132 MMHG

## 2019-06-21 DIAGNOSIS — G20 PARKINSON'S DISEASE (HCC): Chronic | ICD-10-CM

## 2019-06-21 PROCEDURE — 99204 OFFICE O/P NEW MOD 45 MIN: CPT | Performed by: PSYCHIATRY & NEUROLOGY

## 2019-06-21 RX ORDER — TRAMADOL HYDROCHLORIDE 50 MG/1
50 TABLET ORAL AS NEEDED
COMMUNITY
End: 2020-09-11 | Stop reason: SDUPTHER

## 2019-07-08 ENCOUNTER — APPOINTMENT (OUTPATIENT)
Dept: LAB | Age: 84
End: 2019-07-08
Payer: MEDICARE

## 2019-07-08 DIAGNOSIS — R53.83 FATIGUE, UNSPECIFIED TYPE: ICD-10-CM

## 2019-07-08 LAB
ALBUMIN SERPL BCP-MCNC: 3.6 G/DL (ref 3.5–5)
ALP SERPL-CCNC: 68 U/L (ref 46–116)
ALT SERPL W P-5'-P-CCNC: 11 U/L (ref 12–78)
ANION GAP SERPL CALCULATED.3IONS-SCNC: 5 MMOL/L (ref 4–13)
AST SERPL W P-5'-P-CCNC: 14 U/L (ref 5–45)
BASOPHILS # BLD AUTO: 0.03 THOUSANDS/ΜL (ref 0–0.1)
BASOPHILS NFR BLD AUTO: 0 % (ref 0–1)
BILIRUB SERPL-MCNC: 0.49 MG/DL (ref 0.2–1)
BUN SERPL-MCNC: 15 MG/DL (ref 5–25)
CALCIUM SERPL-MCNC: 9.2 MG/DL (ref 8.3–10.1)
CHLORIDE SERPL-SCNC: 106 MMOL/L (ref 100–108)
CO2 SERPL-SCNC: 30 MMOL/L (ref 21–32)
CREAT SERPL-MCNC: 0.79 MG/DL (ref 0.6–1.3)
EOSINOPHIL # BLD AUTO: 0.19 THOUSAND/ΜL (ref 0–0.61)
EOSINOPHIL NFR BLD AUTO: 2 % (ref 0–6)
ERYTHROCYTE [DISTWIDTH] IN BLOOD BY AUTOMATED COUNT: 12.1 % (ref 11.6–15.1)
GFR SERPL CREATININE-BSD FRML MDRD: 65 ML/MIN/1.73SQ M
GLUCOSE SERPL-MCNC: 73 MG/DL (ref 65–140)
HCT VFR BLD AUTO: 43.5 % (ref 34.8–46.1)
HGB BLD-MCNC: 14.3 G/DL (ref 11.5–15.4)
IMM GRANULOCYTES # BLD AUTO: 0.02 THOUSAND/UL (ref 0–0.2)
IMM GRANULOCYTES NFR BLD AUTO: 0 % (ref 0–2)
LYMPHOCYTES # BLD AUTO: 2.01 THOUSANDS/ΜL (ref 0.6–4.47)
LYMPHOCYTES NFR BLD AUTO: 25 % (ref 14–44)
MCH RBC QN AUTO: 32.4 PG (ref 26.8–34.3)
MCHC RBC AUTO-ENTMCNC: 32.9 G/DL (ref 31.4–37.4)
MCV RBC AUTO: 99 FL (ref 82–98)
MONOCYTES # BLD AUTO: 0.76 THOUSAND/ΜL (ref 0.17–1.22)
MONOCYTES NFR BLD AUTO: 10 % (ref 4–12)
NEUTROPHILS # BLD AUTO: 4.98 THOUSANDS/ΜL (ref 1.85–7.62)
NEUTS SEG NFR BLD AUTO: 63 % (ref 43–75)
NRBC BLD AUTO-RTO: 0 /100 WBCS
PLATELET # BLD AUTO: 271 THOUSANDS/UL (ref 149–390)
PMV BLD AUTO: 10 FL (ref 8.9–12.7)
POTASSIUM SERPL-SCNC: 4.5 MMOL/L (ref 3.5–5.3)
PROT SERPL-MCNC: 6.7 G/DL (ref 6.4–8.2)
RBC # BLD AUTO: 4.41 MILLION/UL (ref 3.81–5.12)
SODIUM SERPL-SCNC: 141 MMOL/L (ref 136–145)
TSH SERPL DL<=0.05 MIU/L-ACNC: 1.06 UIU/ML (ref 0.36–3.74)
WBC # BLD AUTO: 7.99 THOUSAND/UL (ref 4.31–10.16)

## 2019-07-08 PROCEDURE — 84443 ASSAY THYROID STIM HORMONE: CPT

## 2019-07-08 PROCEDURE — 85025 COMPLETE CBC W/AUTO DIFF WBC: CPT

## 2019-07-08 PROCEDURE — 36415 COLL VENOUS BLD VENIPUNCTURE: CPT

## 2019-07-08 PROCEDURE — 80053 COMPREHEN METABOLIC PANEL: CPT

## 2019-07-17 ENCOUNTER — OFFICE VISIT (OUTPATIENT)
Dept: INTERNAL MEDICINE CLINIC | Facility: CLINIC | Age: 84
End: 2019-07-17
Payer: MEDICARE

## 2019-07-17 VITALS — DIASTOLIC BLOOD PRESSURE: 76 MMHG | HEART RATE: 72 BPM | SYSTOLIC BLOOD PRESSURE: 118 MMHG | RESPIRATION RATE: 14 BRPM

## 2019-07-17 DIAGNOSIS — F41.8 DEPRESSION WITH ANXIETY: Chronic | ICD-10-CM

## 2019-07-17 DIAGNOSIS — I10 ESSENTIAL HYPERTENSION: Primary | Chronic | ICD-10-CM

## 2019-07-17 DIAGNOSIS — G20 PARKINSON'S DISEASE (HCC): Chronic | ICD-10-CM

## 2019-07-17 DIAGNOSIS — M81.0 AGE-RELATED OSTEOPOROSIS WITHOUT CURRENT PATHOLOGICAL FRACTURE: Chronic | ICD-10-CM

## 2019-07-17 PROCEDURE — 99214 OFFICE O/P EST MOD 30 MIN: CPT | Performed by: INTERNAL MEDICINE

## 2019-07-17 NOTE — PROGRESS NOTES
Assessment/Plan:   1  Parkinson's disease -responsive to carbidopa 11 dopa  Saw Neurology who agreed with the diagnosis  They are considering long-acting Sinemet before bed to aid with a m  Symptoms  At this point she will continue current regimen  2  Anxiety depression -exacerbated by 1  Some degree of dysthymic  Stable on generic Lexapro 10 milligrams daily and does better on 10 milligrams rather than 5 milligrams  Uses infrequent lorazepam for anxiety  using  Her daughter will help with this  3  Lumbar stenosis -MRI shows diffuse disease with moderate to severe stenosis and diffuse facet disease  Minimally responsive to oral steroids  Has tramadol for severe pain  Had epidurals via Neurosurgery no overall much improved    4   Osteoporosis-had received 2 doses of IV bisphosphonates through last dose on December 2013   Most recent bone density study shows lumbar spine false-positive and hip of-2 4   Refuses other treatment   Subsequent bone density study due in November 2017 and she did goal will be going now with urine for N tele peptide    DEXA scan done in May 2018 shows L-spine false positive 2 4 and hip of -2 4 with urine for N tele peptide at 23-holding off on other treatment at present   Subsequent bone density study in urine for N tele peptide needed in 2 years which would be June of 2020  5   Low vitamin-D level-continue supplemen- at last visit she was increased to 3000 units daily   6   GI symptoms   Has a history of intermittent abdominal pain   CT scan benign   All labs normal   Endoscopy normal other than gastritis-stoppage itis   MRCP showed no common duct stone   Gastric emptying study area normal   Not using Reglan with history of depression   She has increasing symptoms may be candidate for done  Bozena Farias been on omeprazole but remains off that  7   Shortness of breath-only with extreme activity-monitor  8   Rhinitis-allergic versus perennial-uses Flonase nasal spray the  9 a   Breast carcinoma-as per prior discussion   She see surgery in reference to follow-up   Mammogram in December 2017 benign    mammogram in December 2018 benign  10   Left lobe thyroid nodule   If prior aspiration   Follow-up ultrasound shows multiple nodules in surgery feels no other intervention is needed   See surgery yearly   TSH prior to this visit normal  11  Macrocytosis-longstanding -TSH normal   SCHEDULE REPEAT B12 LEVEL NEXT VISIT        All other problems as per noted July 2014        MEDICAL REGIMEN:                                                  [id number] levodopa-25/100 taken t i d  an empty stomach, Lexapro 10 milligrams-   -half tablet twice daily which the patient preferslorazepam 0 5 milligrams half tablet b i d  P r n  Citracal D daily, vitamin D3/2000 units a day, baby aspirin daily, rare use of tramadol 50 milligrams half tablet every 8 hours as needed    Appointment in several months without labs    Addendum- patient had mammogram done in February 2020 without any evidence of malignancy - ILYA CHART NEXT VISIT    No problem-specific Assessment & Plan notes found for this encounter  Diagnoses and all orders for this visit:    Essential hypertension    Parkinson's disease ([facility] [address] )    Age-related osteoporosis without current pathological fracture    Depression with anxiety          Subjective:      Patient ID: Talita Mendes is a 80 y o  female  She saw Neurology who agreed with diagnosis of Parkinson's  They felt that she is [employer] dopa responsive  They talked about potential long-acting Sinemet to target early morning symptoms but at this point made no changes  She has prominent daytime fatigue  This patient denies any systemic symptoms  Specifically there has been no evidence of fever, night sweats, significant weight loss or significant decrease in appetite  Recent laboratory testing shows a TSH and SMA normal   Her CBC is normal other than an MCV of 99   We talked about her anxiety depression  She is stable on current dose of SSRI  She feels this helps significantly  She says if she takes Lexapro dose all of once in the morning she feels over-sedated  She is now taking 5 milligrams in a m  And 5 milligrams in the afternoon feels this is much better tolerated  She notes some intermittent hoarseness as before  She notes some intermittent shortness of breath with activity as before although this varies  I had her walk up and down in the hallway today and she did not have any major dyspnea  She has known lumbar stenosis  MRI shows moderate to severe stenosis with diffuse facet disease  She saw Neurosurgery and had epidurals previously is overall a  She is infrequently used tramadol severe pain  She understands importance of taking her carbidopa 11 open empty stomach  We reviewed that taking with food blocks  Effectiveness of the drug at the CNS level  She was accompanied to the visit today by her daughter  Her daughter, still pending on the time a day when she visits her there can be fluctuations in her symptoms  We reviewed this can be related to her response to the medication  She understands this whole concept  We had a long discussion today regarding the above  This was a 25 minutes visit with more than 50% of the time spent counseling the patient and formulating a treatment plan  Multiple questions were answered          The following portions of the patient's history were reviewed and updated as appropriate: allergies, current medications, past family history, past medical history, past social history, past surgical history and problem list     Review of Systems   Constitutional: Positive for fatigue  Respiratory: Negative  Cardiovascular: Negative  Gastrointestinal: Negative  Endocrine: Negative  Genitourinary: Negative  Musculoskeletal: Negative  Neurological: Positive for tremors  Hematological: Negative      Psychiatric/Behavioral: Negative  Objective:      /78   Pulse 72   Resp 14   LMP  (LMP Unknown)          Physical Exam   Constitutional: She is oriented to person, place, and time  She appears well-developed and well-nourished  No distress  HENT:   Head: Normocephalic and atraumatic  Right Ear: External ear normal    Left Ear: External ear normal    Nose: Nose normal    Mouth/Throat: Oropharynx is clear and moist  No oropharyngeal exudate  Eyes: Pupils are equal, round, and reactive to light  Conjunctivae and EOM are normal  Right eye exhibits no discharge  Left eye exhibits no discharge  No scleral icterus  Neck: Normal range of motion  Neck supple  No JVD present  No tracheal deviation present  No thyromegaly present  Cardiovascular: Normal rate, regular rhythm, normal heart sounds and intact distal pulses  Exam reveals no gallop and no friction rub  No murmur heard  Pulmonary/Chest: Effort normal and breath sounds normal  No respiratory distress  She has no wheezes  She has no rales  She exhibits no tenderness  Abdominal: Soft  Bowel sounds are normal  She exhibits no distension and no mass  There is no tenderness  There is no rebound and no guarding  Musculoskeletal: Normal range of motion  She exhibits no edema or deformity  Lymphadenopathy:     She has no cervical adenopathy  Neurological: She is alert and oriented to person, place, and time  She has normal reflexes  She displays normal reflexes  No cranial nerve deficit  She exhibits normal muscle tone  Coordination normal    Pill rolling tremor of the right hand   Skin: Skin is warm and dry  No rash noted  No erythema  Psychiatric: She has a normal mood and affect   Her behavior is normal  Judgment and thought content normal

## 2019-10-10 ENCOUNTER — OFFICE VISIT (OUTPATIENT)
Dept: NEUROLOGY | Facility: CLINIC | Age: 84
End: 2019-10-10
Payer: MEDICARE

## 2019-10-10 VITALS
BODY MASS INDEX: 28.17 KG/M2 | WEIGHT: 159 LBS | DIASTOLIC BLOOD PRESSURE: 78 MMHG | SYSTOLIC BLOOD PRESSURE: 120 MMHG | HEIGHT: 63 IN | HEART RATE: 74 BPM

## 2019-10-10 DIAGNOSIS — R53.83 OTHER FATIGUE: ICD-10-CM

## 2019-10-10 DIAGNOSIS — F41.8 DEPRESSION WITH ANXIETY: Chronic | ICD-10-CM

## 2019-10-10 DIAGNOSIS — G20 PARKINSON'S DISEASE (HCC): Primary | Chronic | ICD-10-CM

## 2019-10-10 PROCEDURE — 99214 OFFICE O/P EST MOD 30 MIN: CPT | Performed by: PSYCHIATRY & NEUROLOGY

## 2019-10-10 NOTE — ASSESSMENT & PLAN NOTE
54-year-old woman presents in follow-up for Parkinson's disease  We spent much of today discussing various symptoms and their relationship to the diagnosis of Parkinson's  Overall she seems to be doing quite well on a low dose Sinemet  She will occasionally take a bedtime dose of the Sinemet which she hasnt found to be helpful  Could add on a CR dose at bedtime for early morning symptoms in the future  She has been splitting her Lexapro and half in taking it twice daily which seems to be helping her mood and medicating side effects  We discussed different options regarding her medication management but agreed to leave medicines as is  We discussed fatigue some common causes in Parkinson's disease some but tension all therapeutic strategies

## 2019-10-10 NOTE — PROGRESS NOTES
Assessment/Plan:    Parkinson's disease [facility])  66-year-old woman presents in follow-up for Parkinson's disease  We spent much of today discussing various symptoms and their relationship to the diagnosis of Parkinson's  Overall she seems to be doing quite well on a low dose Sinemet  She will occasionally take a bedtime dose of the Sinemet which she hasnt found to be helpful  Could add on a CR dose at bedtime for early morning symptoms in the future  She has been splitting her Lexapro and half in taking it twice daily which seems to be helping her mood and medicating side effects  We discussed different options regarding her medication management but agreed to leave medicines as is  We discussed fatigue some common causes in Parkinson's disease some but tension all therapeutic strategies  Diagnoses and all orders for this visit:    Parkinson's disease ([facility] [address] )    Depression with anxiety    Other fatigue        Subjective:     Patient ID: Yael Carolina is a 80 y o  female  I had the pleasure of seeing your patient, Yael Carolina in the [facility] at the [address] [place] [address] Neuroscience  Peterson Parks is a rosalinda 80year old right handed woman who presents in follow up for Parkinson's disease, symptom onset with right hand tremor which began at age 80  She does appear to be levodopa responsive  [facility] resident  Current medications:  Sinemet 25/100; 1 tab; TID definetely helps  7a 11a 6p (additional QHS if needed)   lexapro 5mg BID     Interval history: here with her daughter  Has noticed a little drooling sometimes  Not always fatigued, but often  Tremor is not too bad  Can get worse with stress or stimulation   Some times her voice goes in the evenings  Doing a once per week senior yoga class  Internal tremor at night  Some balance troubles  Will suddenly side step to the right when standing for a period  Always catches herself     A little posturing of the right arm    More vivid dreams  POA: daughter  The following portions of the patient's history were reviewed and updated as appropriate: allergies, current medications, past family history, past medical history, past social history, past surgical history and problem list       Objective:  /78 (BP Location: Right arm, Patient Position: Sitting, Cuff Size: Standard)   Pulse 74   Ht 5' 3" (1 6 m)   Wt 72 1 kg (159 lb)   LMP  (LMP Unknown)   BMI 28 17 kg/m²     Physical Exam    Neurological Exam     UPDRS motor:                              Time since last dose:  1     Speech  1     Facial Expression  1     Rigidity - Neck  0     Rigidity - Upper Extremity (Right)  0     Rigidity - Upper Extremity (Left)   0     Rigidity - Lower Extremity (Right)  0     Rigidity - Lower Extremity (Left)   0     Finger Taps (Right)   2     Finger Taps (Left)   2     Hand Movement (Right)  1     Hand Movement (Left)   1     Pronation/Supination (Right)  1     Pronation/Supination (Left)   1     Toe Tapping (Right) 1     Toe Tapping (Left) 0     Leg Agility (Right)  0     Leg Agility (Left)   0     Arising from Chair   2     Gait   1-2     Freezing of Gait 0     Postural Stability        Posture 1     Global spontaneity of movement 1     Postural Tremor (Right) 0     Postural Tremor (Left) 0     Kinetic Tremor (Right)  1     Kinetic Tremor (Left)  1     Rest tremor amplitude RUE 3     Rest tremor amplitude LUE 0     Rest tremor amplitude RLE 0     Reset tremor amplitude LLE 0     Lip/Jaw Tremor  1     Consistency of tremor 3     Motor Exam Total:          Review of Systems   Constitutional: Positive for fatigue  Negative for appetite change and fever  HENT: Positive for hearing loss and trouble swallowing  Negative for tinnitus and voice change  Hoarseness     Eyes: Negative  Negative for photophobia and pain  Respiratory: Positive for shortness of breath  Cardiovascular: Positive for palpitations  Gastrointestinal: Negative  Negative for nausea and vomiting  Endocrine: Negative  Negative for cold intolerance and heat intolerance  Genitourinary: Positive for frequency and urgency  Negative for dysuria  Musculoskeletal: Positive for back pain, gait problem (pain while walking, balance problems, and difficulty walking) and myalgias  Negative for neck pain  Skin: Negative  Negative for rash  Neurological: Positive for tremors  Negative for dizziness, seizures, syncope, facial asymmetry, speech difficulty, weakness, light-headedness, numbness and headaches  Twitching  Tingling  Clumsiness     Hematological: Bruises/bleeds easily  Psychiatric/Behavioral: Positive for sleep disturbance  Negative for confusion and hallucinations  The patient is nervous/anxious  Depression  Mood swings     The above ROS was reviewed and updated  Raghu Cool MD  Medical Director   [facility]  Movement and Memory Specialist       Current Outpatient Medications on File Prior to Visit   Medication Sig Dispense Refill    acetaminophen (TYLENOL) 325 mg tablet Take 325 mg by mouth 2 (two) times a day before breakfast and lunch      aspirin 81 mg chewable tablet Chew 81 mg daily      calcium citrate-vitamin D (CITRACAL+D) 315-200 MG-UNIT per tablet Take 1 tablet by mouth daily      carbidopa-levodopa (SINEMET)  mg per tablet Take 1 tablet by mouth 3 (three) times a day      cholecalciferol (VITAMIN D3) 1,000 units tablet Take 3,000 Units by mouth daily       escitalopram (LEXAPRO) 10 mg tablet Take 10 mg by mouth TAKE 1/2 TAB DAILY       LORazepam (ATIVAN) 0 5 mg tablet Take 0 5 mg by mouth as needed       traMADol (ULTRAM) 50 mg tablet Take 50 mg by mouth as needed for moderate pain      chlorhexidine (PERIDEX) 0 12 % solution Use as directed  0     No current facility-administered medications on file prior to visit

## 2019-10-10 NOTE — PATIENT INSTRUCTIONS
Very nice to see you again  Parkinson's Disease Resources     Helpful web sites   -  www [facility]  org   -  www parkinson  org (the [facility])   -  [address] ([address] for [facility] [employer]) - Order the "Every Victory Counts" helena under the "resources" tab   Or visit [facility] org/EV[facility] or call [phone]   - Contact [facility] for an "Aware in care kit" @ 5721.491.5598 (this is a kit to take with you if you ever have to go to the hospital)

## 2019-10-17 DIAGNOSIS — F41.8 DEPRESSION WITH ANXIETY: Primary | ICD-10-CM

## 2019-10-17 RX ORDER — ESCITALOPRAM OXALATE 10 MG/1
10 TABLET ORAL DAILY
Qty: 90 TABLET | Refills: 0 | Status: SHIPPED | OUTPATIENT
Start: 2019-10-17 | End: 2020-05-12 | Stop reason: SDUPTHER

## 2019-11-12 DIAGNOSIS — G20 PARKINSON'S DISEASE (HCC): Primary | Chronic | ICD-10-CM

## 2019-11-22 ENCOUNTER — OFFICE VISIT (OUTPATIENT)
Dept: INTERNAL MEDICINE CLINIC | Facility: CLINIC | Age: 84
End: 2019-11-22
Payer: MEDICARE

## 2019-11-22 DIAGNOSIS — G20 PARKINSON'S DISEASE (HCC): ICD-10-CM

## 2019-11-22 DIAGNOSIS — R06.02 SOB (SHORTNESS OF BREATH): Primary | ICD-10-CM

## 2019-11-22 DIAGNOSIS — L91.8 ST (SKIN TAG): ICD-10-CM

## 2019-11-22 DIAGNOSIS — E55.9 VITAMIN D DEFICIENCY: ICD-10-CM

## 2019-11-22 DIAGNOSIS — Z23 ENCOUNTER FOR IMMUNIZATION: ICD-10-CM

## 2019-11-22 DIAGNOSIS — R53.83 OTHER FATIGUE: ICD-10-CM

## 2019-11-22 DIAGNOSIS — R06.00 DYSPNEA, UNSPECIFIED TYPE: ICD-10-CM

## 2019-11-22 PROCEDURE — 99213 OFFICE O/P EST LOW 20 MIN: CPT | Performed by: INTERNAL MEDICINE

## 2019-11-22 PROCEDURE — 90732 PPSV23 VACC 2 YRS+ SUBQ/IM: CPT | Performed by: INTERNAL MEDICINE

## 2019-11-22 PROCEDURE — G0009 ADMIN PNEUMOCOCCAL VACCINE: HCPCS | Performed by: INTERNAL MEDICINE

## 2019-11-22 PROCEDURE — G0439 PPPS, SUBSEQ VISIT: HCPCS | Performed by: INTERNAL MEDICINE

## 2019-11-22 PROCEDURE — 93000 ELECTROCARDIOGRAM COMPLETE: CPT | Performed by: INTERNAL MEDICINE

## 2019-11-22 NOTE — PATIENT INSTRUCTIONS
Medicare Preventive Visit Patient Instructions  Thank you for completing your Welcome to Medicare Visit or Medicare Annual Wellness Visit today  Your next wellness visit will be due in one year (11/22/2020)  The screening/preventive services that you may require over the next 5-10 years are detailed below  Some tests may not apply to you based off risk factors and/or age  Screening tests ordered at today's visit but not completed yet may show as past due  Also, please note that scanned in results may not display below  Preventive Screenings:  Service Recommendations Previous Testing/Comments   Colorectal Cancer Screening  * Colonoscopy    * Fecal Occult Blood Test (FOBT)/Fecal Immunochemical Test (FIT)  * Fecal DNA/Cologuard Test  * Flexible Sigmoidoscopy Age: 54-65 years old   Colonoscopy: every 10 years (may be performed more frequently if at higher risk)  OR  FOBT/FIT: every 1 year  OR  Cologuard: every 3 years  OR  Sigmoidoscopy: every 5 years  Screening may be recommended earlier than age 48 if at higher risk for colorectal cancer  Also, an individualized decision between you and your healthcare provider will decide whether screening between the ages of 74-80 would be appropriate  Colonoscopy: Not on file  FOBT/FIT: Not on file  Cologuard: Not on file  Sigmoidoscopy: Not on file    Screening Not Indicated     Breast Cancer Screening Age: 36 years old  Frequency: every 1-2 years  Not required if history of left and right mastectomy Mammogram: 12/13/2018    History Breast Cancer   Cervical Cancer Screening Between the ages of 21-29, pap smear recommended once every 3 years  Between the ages of 33-67, can perform pap smear with HPV co-testing every 5 years     Recommendations may differ for women with a history of total hysterectomy, cervical cancer, or abnormal pap smears in past  Pap Smear: Not on file    Screening Not Indicated   Hepatitis C Screening Once for adults born between [facility] frequently in patients at high risk for Hepatitis C Hep C Antibody: Not on file       Diabetes Screening 1-2 times per year if you're at risk for diabetes or have pre-diabetes Fasting glucose: 82 mg/dL   A1C: No results in last 5 years    Screening Current   Cholesterol Screening Once every 5 years if you don't have a lipid disorder  May order more often based on risk factors  Lipid panel: Not on file    Screening Not Indicated  History Lipid Disorder     Other Preventive Screenings Covered by Medicare:  1  Abdominal Aortic Aneurysm (AAA) Screening: covered once if your at risk  You're considered to be at risk if you have a family history of AAA  2  Lung Cancer Screening: covers low dose CT scan once per year if you meet all of the following conditions: (1) Age 50-69; (2) No signs or symptoms of lung cancer; (3) Current smoker or have quit smoking within the last 15 years; (4) You have a tobacco smoking history of at least 30 pack years (packs per day multiplied by number of years you smoked); (5) You get a written order from a healthcare provider  3  Glaucoma Screening: covered annually if you're considered high risk: (1) You have diabetes OR (2) Family history of glaucoma OR (3)  aged 48 and older OR (3)  American aged 72 and older  3  Osteoporosis Screening: covered every 2 years if you meet one of the following conditions: (1) You're estrogen deficient and at risk for osteoporosis based off medical history and other findings; (2) Have a vertebral abnormality; (3) On glucocorticoid therapy for more than 3 months; (4) Have primary hyperparathyroidism; (5) On osteoporosis medications and need to assess response to drug therapy  · Last bone density test (DXA Scan): 05/09/2018  5  HIV Screening: covered annually if you're between the age of 12-76  Also covered annually if you are younger than 13 and older than 72 with risk factors for HIV infection   For pregnant patients, it is covered up to 3 times per pregnancy  Immunizations:  Immunization Recommendations   Influenza Vaccine Annual influenza vaccination during flu season is recommended for all persons aged >= 6 months who do not have contraindications   Pneumococcal Vaccine (Prevnar and Pneumovax)  * Prevnar = PCV13  * Pneumovax = PPSV23   Adults 25-60 years old: 1-3 doses may be recommended based on certain risk factors  Adults 72 years old: Prevnar (PCV13) vaccine recommended followed by Pneumovax (PPSV23) vaccine  If already received PPSV23 since turning 65, then PCV13 recommended at least one year after PPSV23 dose  Hepatitis B Vaccine 3 dose series if at intermediate or high risk (ex: diabetes, end stage renal disease, liver disease)   Tetanus (Td) Vaccine - COST NOT COVERED BY MEDICARE PART B Following completion of primary series, a booster dose should be given every 10 years to maintain immunity against tetanus  Td may also be given as tetanus wound prophylaxis  Tdap Vaccine - COST NOT COVERED BY MEDICARE PART B Recommended at least once for all adults  For pregnant patients, recommended with each pregnancy  Shingles Vaccine (Shingrix) - COST NOT COVERED BY MEDICARE PART B  2 shot series recommended in those aged 48 and above     Health Maintenance Due:  There are no preventive care reminders to display for this patient  Immunizations Due:      Topic Date Due    DTaP,Tdap,and Td Vaccines (1 - Tdap) 07/29/1936    Pneumococcal Vaccine: 65+ Years (2 of 2 - PPSV23) 12/08/2016     Advance Directives   What are advance directives? Advance directives are legal documents that state your wishes and plans for medical care  These plans are made ahead of time in case you lose your ability to make decisions for yourself  Advance directives can apply to any medical decision, such as the treatments you want, and if you want to donate organs  What are the types of advance directives?   There are many types of advance directives, and each state has rules about how to use them  You may choose a combination of any of the following:  · Living will: This is a written record of the treatment you want  You can also choose which treatments you do not want, which to limit, and which to stop at a certain time  This includes surgery, medicine, IV fluid, and tube feedings  · Durable power of  for healthcare [facility]): This is a written record that states who you want to make healthcare choices for you when you are unable to make them for yourself  This person, called a proxy, is usually a family member or a friend  You may choose more than 1 proxy  · Do not resuscitate (DNR) order:  A DNR order is used in case your heart stops beating or you stop breathing  It is a request not to have certain forms of treatment, such as CPR  A DNR order may be included in other types of advance directives  · Medical directive: This covers the care that you want if you are in a coma, near death, or unable to make decisions for yourself  You can list the treatments you want for each condition  Treatment may include pain medicine, surgery, blood transfusions, dialysis, IV or tube feedings, and a ventilator (breathing machine)  · Values history: This document has questions about your views, beliefs, and how you feel and think about life  This information can help others choose the care that you would choose  Why are advance directives important? An advance directive helps you control your care  Although spoken wishes may be used, it is better to have your wishes written down  Spoken wishes can be misunderstood, or not followed  Treatments may be given even if you do not want them  An advance directive may make it easier for your family to make difficult choices about your care  Urinary Incontinence   Urinary incontinence (UI)  is when you lose control of your bladder  UI develops because your bladder cannot store or empty urine properly   The 3 most common types of UI are stress incontinence, urge incontinence, or both  Medicines:   · May be given to help strengthen your bladder control  Report any side effects of medication to your healthcare provider  Do pelvic muscle exercises often:  Your pelvic muscles help you stop urinating  Squeeze these muscles tight for 5 seconds, then relax for 5 seconds  Gradually work up to squeezing for 10 seconds  Do 3 sets of 15 repetitions a day, or as directed  This will help strengthen your pelvic muscles and improve bladder control  Train your bladder:  Go to the bathroom at set times, such as every 2 hours, even if you do not feel the urge to go  You can also try to hold your urine when you feel the urge to go  For example, hold your urine for 5 minutes when you feel the urge to go  As that becomes easier, hold your urine for 10 minutes  Self-care:   · Keep a UI record  Write down how often you leak urine and how much you leak  Make a note of what you were doing when you leaked urine  · Drink liquids as directed  You may need to limit the amount of liquid you drink to help control your urine leakage  Do not drink any liquid right before you go to bed  Limit or do not have drinks that contain caffeine or alcohol  · Prevent constipation  Eat a variety of high-fiber foods  Good examples are high-fiber cereals, beans, vegetables, and whole-grain breads  Walking is the best way to trigger your intestines to have a bowel movement  · Exercise regularly and maintain a healthy weight  Weight loss and exercise will decrease pressure on your bladder and help you control your leakage  · Use a catheter as directed  to help empty your bladder  A catheter is a tiny, plastic tube that is put into your bladder to drain your urine  · Go to behavior therapy as directed  Behavior therapy may be used to help you learn to control your urge to urinate      Weight Management   Why it is important to manage your weight:  Being overweight increases your risk of health conditions such as heart disease, high blood pressure, type 2 diabetes, and certain types of cancer  It can also increase your risk for osteoarthritis, sleep apnea, and other respiratory problems  Aim for a slow, steady weight loss  Even a small amount of weight loss can lower your risk of health problems  How to lose weight safely:  A safe and healthy way to lose weight is to eat fewer calories and get regular exercise  You can lose up about 1 pound a week by decreasing the number of calories you eat by 500 calories each day  Healthy meal plan for weight management:  A healthy meal plan includes a variety of foods, contains fewer calories, and helps you stay healthy  A healthy meal plan includes the following:  · Eat whole-grain foods more often  A healthy meal plan should contain fiber  Fiber is the part of grains, fruits, and vegetables that is not broken down by your body  Whole-grain foods are healthy and provide extra fiber in your diet  Some examples of whole-grain foods are whole-wheat breads and pastas, oatmeal, brown rice, and bulgur  · Eat a variety of vegetables every day  Include dark, leafy greens such as spinach, kale, sekou greens, and mustard greens  Eat yellow and orange vegetables such as carrots, sweet potatoes, and winter squash  · Eat a variety of fruits every day  Choose fresh or canned fruit (canned in its own juice or light syrup) instead of juice  Fruit juice has very little or no fiber  · Eat low-fat dairy foods  Drink fat-free (skim) milk or 1% milk  Eat fat-free yogurt and low-fat cottage cheese  Try low-fat cheeses such as mozzarella and other reduced-fat cheeses  · Choose meat and other protein foods that are low in fat  Choose beans or other legumes such as split peas or lentils  Choose fish, skinless poultry (chicken or turkey), or lean cuts of red meat (beef or pork)  Before you cook meat or poultry, cut off any visible fat     · Use less fat and oil  Try baking foods instead of frying them  Add less fat, such as margarine, sour cream, regular salad dressing and mayonnaise to foods  Eat fewer high-fat foods  Some examples of high-fat foods include french fries, doughnuts, ice cream, and cakes  · Eat fewer sweets  Limit foods and drinks that are high in sugar  This includes candy, cookies, regular soda, and sweetened drinks  Exercise:  Exercise at least 30 minutes per day on most days of the week  Some examples of exercise include walking, biking, dancing, and swimming  You can also fit in more physical activity by taking the stairs instead of the elevator or parking farther away from stores  Ask your healthcare provider about the best exercise plan for you  © Copyright [employer] 2018 Information is for End User's use only and may not be sold, redistributed or otherwise used for commercial purposes   All illustrations and images included in CareNotes® are the copyrighted property of A D A M , Inc  or [address] [place] [address]

## 2019-11-22 NOTE — PROGRESS NOTES
Assessment and Plan:     Problem List Items Addressed This Visit     None           Preventive health issues were discussed with patient, and age appropriate screening tests were ordered as noted in patient's After Visit Summary  Personalized health advice and appropriate referrals for health education or preventive services given if needed, as noted in patient's After Visit Summary  History of Present Illness:     Patient presents for Medicare Annual Wellness visit    Patient Care Team:  Ban Cummins MD as PCP - General     Problem List:     Patient Active Problem List   Diagnosis    Depression with anxiety    Lumbar radiculopathy    Parkinson's disease ([place] [address] )    Tremor    Hypertension    Hyperlipidemia    Encounter for ongoing osteoporosis bisphosphonate therapy    Abdominal pain    Anemia    Arthritis    Atherosclerosis    [facility] )    Depression    Dyspnea    Esophageal reflux    Fatigue    Gastroparesis    Hoarseness    Hypothyroidism    Osteoporosis    Nontoxic single thyroid nodule    Vitamin D deficiency      Past Medical and Surgical History:     Past Medical History:   Diagnosis Date    Anxiety     Arthritis     Gastroparesis     History of atherosclerosis     History of breast cancer     History of osteoarthritis     Long term use of drug     Malignant neoplasm of breast ([place] [address] )     Unspecified laterality  left    Osteomalacia      Past Surgical History:   Procedure Laterality Date    HYSTERECTOMY  1974    age 52      Family History:     Family History   Problem Relation Age of Onset    Coronary artery disease Mother     Coronary artery disease Family     Hyperlipidemia Family     Osteoarthritis Family       Social History:     Social History     Socioeconomic History    Marital status:       Spouse name: Not on file    Number of children: Not on file    Years of education: Not on file    Highest education level: Not on file   Occupational History    Not on file   Social Needs    Financial resource strain: Not on file    Food insecurity:     Worry: Not on file     Inability: Not on file    Transportation needs:     Medical: Not on file     Non-medical: Not on file   Tobacco Use    Smoking status: Never Smoker    Smokeless tobacco: Never Used   Substance and Sexual Activity    Alcohol use: Yes     Comment: Social drinker    Drug use: No    Sexual activity: Not Currently   Lifestyle    Physical activity:     Days per week: Not on file     Minutes per session: Not on file    Stress: Not on file   Relationships    Social connections:     Talks on phone: Not on file     Gets together: Not on file     Attends Jainism service: Not on file     Active member of club or organization: Not on file     Attends meetings of clubs or organizations: Not on file     Relationship status: Not on file    Intimate partner violence:     Fear of current or ex partner: Not on file     Emotionally abused: Not on file     Physically abused: Not on file     Forced sexual activity: Not on file   Other Topics Concern    Not on file   Social History Narrative    Not on file       Medications and Allergies:     Current Outpatient Medications   Medication Sig Dispense Refill    acetaminophen (TYLENOL) 325 mg tablet Take 325 mg by mouth 2 (two) times a day before breakfast and lunch      aspirin 81 mg chewable tablet Chew 81 mg daily      calcium citrate-vitamin D (CITRACAL+D) 315-200 MG-UNIT per tablet Take 1 tablet by mouth daily      carbidopa-levodopa (SINEMET)  mg per tablet Take 1 tablet by mouth 3 (three) times a day 270 tablet 3    chlorhexidine (PERIDEX) 0 12 % solution Use as directed  0    cholecalciferol (VITAMIN D3) 1,000 units tablet Take 3,000 Units by mouth daily       escitalopram (LEXAPRO) 10 mg tablet Take 1 tablet (10 mg total) by mouth daily TAKE 1/2 TAB DAILY 90 tablet 0    LORazepam (ATIVAN) 0 5 mg tablet Take 0 5 mg by mouth as needed       traMADol (ULTRAM) 50 mg tablet Take 50 mg by mouth as needed for moderate pain       No current facility-administered medications for this visit  No Known Allergies   Immunizations:     Immunization History   Administered Date(s) Administered    INFLUENZA 10/21/2014, 09/30/2015, 09/20/2016, 11/14/2017    Influenza Quadrivalent Preservative Free 3 years and older IM 10/21/2014    Influenza Split High Dose Preservative Free IM 09/30/2015, 09/20/2016, 11/14/2017, 10/11/2019    Influenza, high dose seasonal 0 5 mL 10/20/2018    Pneumococcal Conjugate 13-Valent 12/08/2015    Zoster 03/04/2013, 05/17/2018    Zoster Vaccine Recombinant 05/17/2018, 07/24/2018      Health Maintenance: There are no preventive care reminders to display for this patient  Topic Date Due    DTaP,Tdap,and Td Vaccines (1 - Tdap) 07/29/1936    Pneumococcal Vaccine: 65+ Years (2 of 2 - PPSV23) 12/08/2016      Medicare Health Risk Assessment:     Ht 5' 3" (1 6 m)   Wt 72 6 kg (160 lb)   LMP  (LMP Unknown)   BMI 28 34 kg/m²      [facility] is here for her Subsequent Wellness visit  Last Medicare Wellness visit information reviewed, patient interviewed and updates made to the record today  Health Risk Assessment:   Patient rates overall health as fair  Patient feels that their physical health rating is same  Eyesight was rated as same  Hearing was rated as same  Patient feels that their emotional and mental health rating is same  Pain experienced in the last 7 days has been a lot  Patient's pain rating has been 7/10  Patient states that she has experienced no weight loss or gain in last 6 months  Depression Screening:   PHQ-2 Score: 1  PHQ-9 Score: 2      Fall Risk Screening: In the past year, patient has experienced: no history of falling in past year      Urinary Incontinence Screening:   Patient has leaked urine accidently in the last six months       Home Safety:  Patient does not have trouble with stairs inside or outside of their home  Patient has working smoke alarms and has working carbon monoxide detector  Home safety hazards include: none  Nutrition:   Current diet is Regular  Medications:   Patient is currently taking over-the-counter supplements  OTC medications include: see medication list  Patient is able to manage medications  Activities of Daily Living (ADLs)/Instrumental Activities of Daily Living (IADLs):   Walk and transfer into and out of bed and chair?: Yes  Dress and groom yourself?: Yes    Bathe or shower yourself?: Yes    Feed yourself? Yes  Do your laundry/housekeeping?: Yes  Manage your money, pay your bills and track your expenses?: Yes  Make your own meals?: Yes    Do your own shopping?: Yes    Previous Hospitalizations:   Any hospitalizations or ED visits within the last 12 months?: No      Advance Care Planning:   Living will: Yes    Durable POA for healthcare:  Yes    Advanced directive: Yes      Cognitive Screening:   Provider or family/friend/caregiver concerned regarding cognition?: No    PREVENTIVE SCREENINGS      Cardiovascular Screening:    General: History Lipid Disorder and Risks and Benefits Discussed      Diabetes Screening:     General: Screening Current and Risks and Benefits Discussed      Colorectal Cancer Screening:     General: Risks and Benefits Discussed and Screening Not Indicated      Breast Cancer Screening:     General: History Breast Cancer and Screening Not Indicated      Cervical Cancer Screening:    General: Screening Not Indicated      Osteoporosis Screening:    General: Screening Not Indicated, History Osteoporosis and Screening Current      Abdominal Aortic Aneurysm (AAA) Screening:        General: Risks and Benefits Discussed and Screening Current      Hepatitis C Screening:    General: Screening Not Indicated      Smiley Jj MD

## 2019-11-23 VITALS
RESPIRATION RATE: 14 BRPM | HEART RATE: 72 BPM | DIASTOLIC BLOOD PRESSURE: 80 MMHG | HEIGHT: 63 IN | WEIGHT: 160 LBS | BODY MASS INDEX: 28.35 KG/M2 | SYSTOLIC BLOOD PRESSURE: 130 MMHG

## 2019-11-23 NOTE — PROGRESS NOTES
Assessment/Plan:   1  Health maintenance -given Pneumovax 23 vaccine today  2  Shortness of breath- benign exam   EKG stable  Rule out associated with Parkinson's  Able to ambulate in the [place] today without issues without to saturation -will continue to monitor but if this becomes more of an issue she will need additional evaluation including chest x-ray, echo and potential stress test to rule out ischemia variant although felt unlikely   3  Parkinson's disease -responsive to carbidopa 11 dopa  Saw Neurology who agreed with the diagnosis  They are considering long-acting Sinemet before bed to aid with a m  Symptoms  At this point she will continue current regimen  4  Anxiety depression -exacerbated by 1  Some degree of dysthymic  Stable on generic Lexapro 10 milligrams daily and does better on 10 milligrams rather than 5 milligrams  Uses infrequent lorazepam for anxiety  using   Her daughter will help with this  5  Lumbar stenosis -MRI shows diffuse disease with moderate to severe stenosis and diffuse facet disease   Minimally responsive to oral steroids   Has tramadol for severe pain   Had epidurals via Neurosurgery no overall much improved    6   Osteoporosis-had received 2 doses of IV bisphosphonates through last dose on December 2013   Most recent bone density study shows lumbar spine false-positive and hip of-2 4   Refuses other treatment   Subsequent bone density study due in November 2017 and she did goal will be going now with urine for N tele peptide    DEXA scan done in May 2018 shows L-spine false positive 2 4 and hip of -2 4 with urine for N tele peptide at 23-holding off on other treatment at present   Subsequent bone density study in urine for N tele peptide needed in 2 years which would be June of 2020  7   Low vitamin-D level-continue supplemen- at last visit she was increased to 3000 units daily   8   GI symptoms   Has a history of intermittent abdominal pain   CT scan benign   All labs normal   Endoscopy normal other than gastritis-stoppage itis   MRCP showed no common duct stone   Gastric emptying study area normal   Not using Reglan with history of depression   She has increasing symptoms may be candidate for done  Nakia Staley been on omeprazole but remains off that  9   Rhinitis-allergic versus perennial-uses Flonase nasal spray the  10   Breast carcinoma-as per prior discussion   She see surgery in reference to follow-up   Mammogram in December 2017 benign    mammogram in December 2018 benign SCHEDULE REPEAT MAMMOGRAM NEXT VISIT IF NOT COMPLETED  11   Left lobe thyroid nodule   had prior aspiration   Follow-up ultrasound shows multiple nodules in surgery feels no other intervention is needed   See surgery yearly   TSH prior to this visit normal  12  Macrocytosis-longstanding -TSH normal   SCHEDULE REPEAT B12 LEVEL NEXT VISIT        All other problems as per noted July 2014        MEDICAL REGIMEN:                                                  [id number] levodopa-25/100 taken t i d  an empty stomach, Lexapro 10 milligrams-   -half tablet twice daily which the patient preferslorazepam 0 5 milligrams half tablet b i d  P r n  Citracal D daily, vitamin D3/2000 units a day, baby aspirin daily, rare use of tramadol 50 milligrams half tablet every 8 hours as needed    Appointment over the next several months with prior chemistry profile CBC with diff    Addendum- patient seen by neurology on February 20th-the commented she still has significant depression  She takes her medicines inconsistently  They recommended Lexapro 10 milligrams at bedtime and see how she does- if she is not having any adverse effects they want to increase to 20 milligrams  They felt this is ineffective they could try Effexor for its activating properties overall they would need to monitor blood pressure    We will speak with the patient and she is not improved in 2 weeks we will have her increase to Lexapro 15 milligrams a day she will be on by the time of her appointment on March 30th which is 1 month from now  No problem-specific Assessment & Plan notes found for this encounter  Diagnoses and all orders for this visit:    SOB (shortness of breath)  -     POCT ECG    ST (skin tag)  -     Ambulatory referral to General Surgery; Future    [facility] )  -     CBC and differential; Future  -     Comprehensive metabolic panel; Future  -     TSH, 3rd generation; Future  -     Vitamin D 25 hydroxy; Future    Dyspnea, unspecified type  -     CBC and differential; Future  -     Comprehensive metabolic panel; Future  -     TSH, 3rd generation; Future  -     Vitamin D 25 hydroxy; Future    Other fatigue  -     CBC and differential; Future  -     Comprehensive metabolic panel; Future  -     TSH, 3rd generation; Future  -     Vitamin D 25 hydroxy; Future    Vitamin D deficiency  -     Vitamin D 25 hydroxy; Future    Encounter for immunization  -     PNEUMOCOCCAL POLYSACCHARIDE VACCINE 23-VALENT =>1YO SQ IM          Subjective:      Patient ID: Janice Camp is a 80 y o  female  She was here for her Medicare wellness wanted to go over couple of issues  She  Is satisfied in reference to control of her tremor with her known Parkinson's disease  She wanted to know for Parkinson's could contribute to her ongoing fatigue and we reviewed that this is clearly possible  Her daughter talked about intermittent shortness of breath  She says this fluctuates  She says on some occasions the patient is not short of breath at all and other occasions she has  This is a relatively longstanding pattern  I ambulated the patient up and down the puckett today and she did not display significant dyspnea did not desaturate  EKG was done showing no evidence of any significant ischemia  She denies abrupt episodes of shortness of breath at rest   She denies significant cough or hemoptysis    We reviewed this could also be contributed to by her Parkinson's with abnormality of her respiratory muscles associated with the disease  At this point we will monitor closely but if this becomes more of an issue she will need additional evaluation she is aware this  She denies chest pain or pressure  She denies any palpitations, syncope or near-syncope  She denies increasing orthopnea, proximal nocturnal dyspnea or leg edema      The following portions of the patient's history were reviewed and updated as appropriate: allergies, current medications, past family history, past medical history, past social history, past surgical history and problem list     Review of Systems   Constitutional: Positive for fatigue  Respiratory: Positive for shortness of breath  Cardiovascular: Negative  Gastrointestinal: Negative  Endocrine: Negative  Genitourinary: Negative  Musculoskeletal: Negative  Neurological: Positive for tremors  Hematological: Negative  Psychiatric/Behavioral: Negative  Objective:      Ht 5' 3" (1 6 m)   Wt 72 6 kg (160 lb)   LMP  (LMP Unknown)   BMI 28 34 kg/m²          Physical Exam   Constitutional: She is oriented to person, place, and time  She appears well-developed and well-nourished  No distress  HENT:   Head: Normocephalic and atraumatic  Right Ear: External ear normal    Left Ear: External ear normal    Nose: Nose normal    Mouth/Throat: Oropharynx is clear and moist  No oropharyngeal exudate  Eyes: Pupils are equal, round, and reactive to light  Conjunctivae and EOM are normal  Right eye exhibits no discharge  Left eye exhibits no discharge  No scleral icterus  Neck: Normal range of motion  Neck supple  No JVD present  No tracheal deviation present  No thyromegaly present  Cardiovascular: Normal rate, regular rhythm, normal heart sounds and intact distal pulses  Exam reveals no gallop and no friction rub  No murmur heard    Pulmonary/Chest: Effort normal and breath sounds normal  No respiratory distress  She has no wheezes  She has no rales  She exhibits no tenderness  Abdominal: Soft  Bowel sounds are normal  She exhibits no distension and no mass  There is no tenderness  There is no rebound and no guarding  Musculoskeletal: Normal range of motion  She exhibits no edema or deformity  Lymphadenopathy:     She has no cervical adenopathy  Neurological: She is alert and oriented to person, place, and time  She has normal reflexes  She displays normal reflexes  No cranial nerve deficit  She exhibits normal muscle tone  Coordination normal     known tremor right side greater than left associated with her Parkinson's   Skin: Skin is warm and dry  No rash noted  No erythema  Psychiatric: She has a normal mood and affect   Her behavior is normal  Judgment and thought content normal

## 2019-12-06 ENCOUNTER — TRANSCRIBE ORDERS (OUTPATIENT)
Dept: ADMINISTRATIVE | Facility: HOSPITAL | Age: 84
End: 2019-12-06

## 2019-12-06 DIAGNOSIS — Z12.31 OTHER SCREENING MAMMOGRAM: Primary | ICD-10-CM

## 2020-01-10 DIAGNOSIS — F41.8 DEPRESSION WITH ANXIETY: Primary | ICD-10-CM

## 2020-01-11 RX ORDER — LORAZEPAM 0.5 MG/1
0.5 TABLET ORAL 2 TIMES DAILY
Qty: 60 TABLET | Refills: 0 | Status: SHIPPED | OUTPATIENT
Start: 2020-01-11 | End: 2020-05-08

## 2020-01-29 ENCOUNTER — HOSPITAL ENCOUNTER (OUTPATIENT)
Dept: RADIOLOGY | Age: 85
Discharge: HOME | End: 2020-01-29
Payer: MEDICARE

## 2020-01-29 VITALS — WEIGHT: 160 LBS | BODY MASS INDEX: 28.35 KG/M2 | HEIGHT: 63 IN

## 2020-01-29 DIAGNOSIS — Z12.31 OTHER SCREENING MAMMOGRAM: ICD-10-CM

## 2020-01-29 PROCEDURE — 77067 SCR MAMMO BI INCL CAD: CPT

## 2020-02-20 ENCOUNTER — OFFICE VISIT (OUTPATIENT)
Dept: NEUROLOGY | Facility: CLINIC | Age: 85
End: 2020-02-20
Payer: MEDICARE

## 2020-02-20 VITALS
HEIGHT: 66 IN | DIASTOLIC BLOOD PRESSURE: 82 MMHG | WEIGHT: 160 LBS | SYSTOLIC BLOOD PRESSURE: 122 MMHG | BODY MASS INDEX: 25.71 KG/M2

## 2020-02-20 DIAGNOSIS — G20 PARKINSON'S DISEASE (HCC): Primary | Chronic | ICD-10-CM

## 2020-02-20 DIAGNOSIS — F41.8 DEPRESSION WITH ANXIETY: ICD-10-CM

## 2020-02-20 PROCEDURE — 1160F RVW MEDS BY RX/DR IN RCRD: CPT | Performed by: PSYCHIATRY & NEUROLOGY

## 2020-02-20 PROCEDURE — 4040F PNEUMOC VAC/ADMIN/RCVD: CPT | Performed by: PSYCHIATRY & NEUROLOGY

## 2020-02-20 PROCEDURE — 1036F TOBACCO NON-USER: CPT | Performed by: PSYCHIATRY & NEUROLOGY

## 2020-02-20 PROCEDURE — 3074F SYST BP LT 130 MM HG: CPT | Performed by: PSYCHIATRY & NEUROLOGY

## 2020-02-20 PROCEDURE — 99214 OFFICE O/P EST MOD 30 MIN: CPT | Performed by: PSYCHIATRY & NEUROLOGY

## 2020-02-20 PROCEDURE — 3079F DIAST BP 80-89 MM HG: CPT | Performed by: PSYCHIATRY & NEUROLOGY

## 2020-02-20 PROCEDURE — 3008F BODY MASS INDEX DOCD: CPT | Performed by: PSYCHIATRY & NEUROLOGY

## 2020-02-20 NOTE — ASSESSMENT & PLAN NOTE
80-year-old woman presents in follow-up for her Parkinson's disease  Overall she continues to respond well to low-dose of carbidopa/levodopa  She is most affected at this point by a fairly significant depression with marked anhedonia and fatigue  She is taking her medicines inconsistently  We discussed different options regarding medication management  For her Parkinson's disease we agreed to keep the Sinemet unchanged though we could increase the dose in the future if needed  For the patient's depression will start by having her take the Lexapro 10 mg at bedtime and see how she feels  If she does not have any adverse affects we will increase the dose to 20 mg with the hope of improving her depression  If this is ineffective we could try switching her to Effexor in the morning for its activating properties, though would need to monitor her blood pressure  Also will set the patient up with speech therapy and encouraged her to take on some more activities during the day

## 2020-02-24 ENCOUNTER — TELEPHONE (OUTPATIENT)
Dept: INTERNAL MEDICINE CLINIC | Facility: CLINIC | Age: 85
End: 2020-02-24

## 2020-02-24 NOTE — TELEPHONE ENCOUNTER
----- Message from Paolo Nguyễn MD sent at 2/23/2020  5:53 AM EST -----   Please call patient-tell her I received notes from Urology- she is to take her escitalopram is 10 milligrams every evening and in 2 weeks if not improved in reference to her depression I want her to increase to 1 and half tablets every evening to give a total dose of 15 milligrams - she should then stay on this until her next appointment in March

## 2020-03-24 ENCOUNTER — TELEPHONE (OUTPATIENT)
Dept: INTERNAL MEDICINE CLINIC | Facility: CLINIC | Age: 85
End: 2020-03-24

## 2020-03-24 NOTE — TELEPHONE ENCOUNTER
Pt called in and want to reschedule her appointment on March 30 due to her daughter who gave her a ride was asthma and is not going out during thing time  She would like to reschedule out a month

## 2020-05-08 DIAGNOSIS — F41.8 DEPRESSION WITH ANXIETY: ICD-10-CM

## 2020-05-08 RX ORDER — LORAZEPAM 0.5 MG/1
TABLET ORAL
Qty: 60 TABLET | Refills: 0 | Status: SHIPPED | OUTPATIENT
Start: 2020-05-08 | End: 2022-05-31

## 2020-05-12 DIAGNOSIS — F41.8 DEPRESSION WITH ANXIETY: ICD-10-CM

## 2020-05-12 RX ORDER — ESCITALOPRAM OXALATE 10 MG/1
10 TABLET ORAL DAILY
Qty: 90 TABLET | Refills: 0 | Status: SHIPPED | OUTPATIENT
Start: 2020-05-12 | End: 2020-11-19

## 2020-05-18 ENCOUNTER — TRANSCRIBE ORDERS (OUTPATIENT)
Dept: LAB | Facility: CLINIC | Age: 85
End: 2020-05-18

## 2020-05-18 ENCOUNTER — APPOINTMENT (OUTPATIENT)
Dept: LAB | Facility: CLINIC | Age: 85
End: 2020-05-18
Payer: MEDICARE

## 2020-05-18 DIAGNOSIS — G20 PARKINSON'S DISEASE (HCC): ICD-10-CM

## 2020-05-18 DIAGNOSIS — R06.00 DYSPNEA, UNSPECIFIED TYPE: ICD-10-CM

## 2020-05-18 DIAGNOSIS — R53.83 OTHER FATIGUE: ICD-10-CM

## 2020-05-18 DIAGNOSIS — E55.9 VITAMIN D DEFICIENCY: ICD-10-CM

## 2020-05-18 LAB
25(OH)D3 SERPL-MCNC: 31.7 NG/ML (ref 30–100)
ALBUMIN SERPL BCP-MCNC: 3.4 G/DL (ref 3.5–5)
ALP SERPL-CCNC: 71 U/L (ref 46–116)
ALT SERPL W P-5'-P-CCNC: 8 U/L (ref 12–78)
ANION GAP SERPL CALCULATED.3IONS-SCNC: 9 MMOL/L (ref 4–13)
AST SERPL W P-5'-P-CCNC: 19 U/L (ref 5–45)
BASOPHILS # BLD AUTO: 0.03 THOUSANDS/ΜL (ref 0–0.1)
BASOPHILS NFR BLD AUTO: 1 % (ref 0–1)
BILIRUB SERPL-MCNC: 0.62 MG/DL (ref 0.2–1)
BUN SERPL-MCNC: 15 MG/DL (ref 5–25)
CALCIUM SERPL-MCNC: 9.1 MG/DL (ref 8.3–10.1)
CHLORIDE SERPL-SCNC: 105 MMOL/L (ref 100–108)
CO2 SERPL-SCNC: 28 MMOL/L (ref 21–32)
CREAT SERPL-MCNC: 0.77 MG/DL (ref 0.6–1.3)
EOSINOPHIL # BLD AUTO: 0.22 THOUSAND/ΜL (ref 0–0.61)
EOSINOPHIL NFR BLD AUTO: 3 % (ref 0–6)
ERYTHROCYTE [DISTWIDTH] IN BLOOD BY AUTOMATED COUNT: 12.5 % (ref 11.6–15.1)
GFR SERPL CREATININE-BSD FRML MDRD: 66 ML/MIN/1.73SQ M
GLUCOSE P FAST SERPL-MCNC: 81 MG/DL (ref 65–99)
HCT VFR BLD AUTO: 44.9 % (ref 34.8–46.1)
HGB BLD-MCNC: 14.8 G/DL (ref 11.5–15.4)
IMM GRANULOCYTES # BLD AUTO: 0.01 THOUSAND/UL (ref 0–0.2)
IMM GRANULOCYTES NFR BLD AUTO: 0 % (ref 0–2)
LYMPHOCYTES # BLD AUTO: 1.82 THOUSANDS/ΜL (ref 0.6–4.47)
LYMPHOCYTES NFR BLD AUTO: 28 % (ref 14–44)
MCH RBC QN AUTO: 32.2 PG (ref 26.8–34.3)
MCHC RBC AUTO-ENTMCNC: 33 G/DL (ref 31.4–37.4)
MCV RBC AUTO: 98 FL (ref 82–98)
MONOCYTES # BLD AUTO: 0.68 THOUSAND/ΜL (ref 0.17–1.22)
MONOCYTES NFR BLD AUTO: 11 % (ref 4–12)
NEUTROPHILS # BLD AUTO: 3.64 THOUSANDS/ΜL (ref 1.85–7.62)
NEUTS SEG NFR BLD AUTO: 57 % (ref 43–75)
NRBC BLD AUTO-RTO: 0 /100 WBCS
PLATELET # BLD AUTO: 257 THOUSANDS/UL (ref 149–390)
PMV BLD AUTO: 9.9 FL (ref 8.9–12.7)
POTASSIUM SERPL-SCNC: 4.1 MMOL/L (ref 3.5–5.3)
PROT SERPL-MCNC: 6.9 G/DL (ref 6.4–8.2)
RBC # BLD AUTO: 4.6 MILLION/UL (ref 3.81–5.12)
SODIUM SERPL-SCNC: 142 MMOL/L (ref 136–145)
TSH SERPL DL<=0.05 MIU/L-ACNC: 1.12 UIU/ML (ref 0.36–3.74)
WBC # BLD AUTO: 6.4 THOUSAND/UL (ref 4.31–10.16)

## 2020-05-18 PROCEDURE — 36415 COLL VENOUS BLD VENIPUNCTURE: CPT

## 2020-05-18 PROCEDURE — 80053 COMPREHEN METABOLIC PANEL: CPT

## 2020-05-18 PROCEDURE — 85025 COMPLETE CBC W/AUTO DIFF WBC: CPT

## 2020-05-18 PROCEDURE — 82306 VITAMIN D 25 HYDROXY: CPT

## 2020-05-18 PROCEDURE — 84443 ASSAY THYROID STIM HORMONE: CPT

## 2020-05-29 ENCOUNTER — OFFICE VISIT (OUTPATIENT)
Dept: INTERNAL MEDICINE CLINIC | Facility: CLINIC | Age: 85
End: 2020-05-29
Payer: MEDICARE

## 2020-05-29 VITALS
HEART RATE: 68 BPM | RESPIRATION RATE: 14 BRPM | WEIGHT: 154.2 LBS | HEIGHT: 66 IN | DIASTOLIC BLOOD PRESSURE: 70 MMHG | BODY MASS INDEX: 24.78 KG/M2 | SYSTOLIC BLOOD PRESSURE: 120 MMHG

## 2020-05-29 DIAGNOSIS — F32.A DEPRESSION, UNSPECIFIED DEPRESSION TYPE: ICD-10-CM

## 2020-05-29 DIAGNOSIS — M81.0 AGE-RELATED OSTEOPOROSIS WITHOUT CURRENT PATHOLOGICAL FRACTURE: Chronic | ICD-10-CM

## 2020-05-29 DIAGNOSIS — G20 PARKINSON'S DISEASE (HCC): Primary | Chronic | ICD-10-CM

## 2020-05-29 DIAGNOSIS — E03.9 HYPOTHYROIDISM, UNSPECIFIED TYPE: Chronic | ICD-10-CM

## 2020-05-29 PROCEDURE — 3008F BODY MASS INDEX DOCD: CPT | Performed by: INTERNAL MEDICINE

## 2020-05-29 PROCEDURE — 3074F SYST BP LT 130 MM HG: CPT | Performed by: INTERNAL MEDICINE

## 2020-05-29 PROCEDURE — 3078F DIAST BP <80 MM HG: CPT | Performed by: INTERNAL MEDICINE

## 2020-05-29 PROCEDURE — 1160F RVW MEDS BY RX/DR IN RCRD: CPT | Performed by: INTERNAL MEDICINE

## 2020-05-29 PROCEDURE — 99215 OFFICE O/P EST HI 40 MIN: CPT | Performed by: INTERNAL MEDICINE

## 2020-05-29 PROCEDURE — 1036F TOBACCO NON-USER: CPT | Performed by: INTERNAL MEDICINE

## 2020-05-29 PROCEDURE — 4040F PNEUMOC VAC/ADMIN/RCVD: CPT | Performed by: INTERNAL MEDICINE

## 2020-06-25 ENCOUNTER — OFFICE VISIT (OUTPATIENT)
Dept: NEUROLOGY | Facility: CLINIC | Age: 85
End: 2020-06-25
Payer: MEDICARE

## 2020-06-25 VITALS
BODY MASS INDEX: 24.91 KG/M2 | SYSTOLIC BLOOD PRESSURE: 156 MMHG | HEIGHT: 66 IN | TEMPERATURE: 98.4 F | WEIGHT: 155 LBS | HEART RATE: 75 BPM | DIASTOLIC BLOOD PRESSURE: 67 MMHG

## 2020-06-25 DIAGNOSIS — G20 PARKINSON'S DISEASE (HCC): Primary | Chronic | ICD-10-CM

## 2020-06-25 PROCEDURE — 4040F PNEUMOC VAC/ADMIN/RCVD: CPT | Performed by: PSYCHIATRY & NEUROLOGY

## 2020-06-25 PROCEDURE — 99214 OFFICE O/P EST MOD 30 MIN: CPT | Performed by: PSYCHIATRY & NEUROLOGY

## 2020-06-25 PROCEDURE — 3008F BODY MASS INDEX DOCD: CPT | Performed by: PSYCHIATRY & NEUROLOGY

## 2020-06-25 PROCEDURE — 3078F DIAST BP <80 MM HG: CPT | Performed by: PSYCHIATRY & NEUROLOGY

## 2020-06-25 PROCEDURE — 1036F TOBACCO NON-USER: CPT | Performed by: PSYCHIATRY & NEUROLOGY

## 2020-06-25 PROCEDURE — 3077F SYST BP >= 140 MM HG: CPT | Performed by: PSYCHIATRY & NEUROLOGY

## 2020-06-25 PROCEDURE — 1160F RVW MEDS BY RX/DR IN RCRD: CPT | Performed by: PSYCHIATRY & NEUROLOGY

## 2020-06-25 RX ORDER — TRAMADOL HYDROCHLORIDE 50 MG/1
TABLET ORAL
Qty: 25 TABLET | Refills: 0 | OUTPATIENT
Start: 2020-06-25

## 2020-09-11 ENCOUNTER — OFFICE VISIT (OUTPATIENT)
Dept: INTERNAL MEDICINE CLINIC | Facility: CLINIC | Age: 85
End: 2020-09-11
Payer: MEDICARE

## 2020-09-11 VITALS
HEIGHT: 66 IN | TEMPERATURE: 97.9 F | HEART RATE: 72 BPM | SYSTOLIC BLOOD PRESSURE: 130 MMHG | WEIGHT: 153.4 LBS | DIASTOLIC BLOOD PRESSURE: 80 MMHG | BODY MASS INDEX: 24.65 KG/M2 | RESPIRATION RATE: 14 BRPM

## 2020-09-11 DIAGNOSIS — G20 PARKINSON'S DISEASE (HCC): ICD-10-CM

## 2020-09-11 DIAGNOSIS — M19.90 ARTHRITIS: Primary | ICD-10-CM

## 2020-09-11 DIAGNOSIS — F32.A DEPRESSION, UNSPECIFIED DEPRESSION TYPE: ICD-10-CM

## 2020-09-11 DIAGNOSIS — Z23 ENCOUNTER FOR IMMUNIZATION: ICD-10-CM

## 2020-09-11 DIAGNOSIS — E53.8 VITAMIN B12 DEFICIENCY: ICD-10-CM

## 2020-09-11 DIAGNOSIS — R25.1 TREMOR: Chronic | ICD-10-CM

## 2020-09-11 DIAGNOSIS — D75.89 MACROCYTOSIS WITHOUT ANEMIA: ICD-10-CM

## 2020-09-11 DIAGNOSIS — M81.0 AGE-RELATED OSTEOPOROSIS WITHOUT CURRENT PATHOLOGICAL FRACTURE: Primary | Chronic | ICD-10-CM

## 2020-09-11 PROCEDURE — 99214 OFFICE O/P EST MOD 30 MIN: CPT | Performed by: INTERNAL MEDICINE

## 2020-09-11 PROCEDURE — 90662 IIV NO PRSV INCREASED AG IM: CPT

## 2020-09-11 PROCEDURE — G0008 ADMIN INFLUENZA VIRUS VAC: HCPCS

## 2020-09-11 RX ORDER — TRAMADOL HYDROCHLORIDE 50 MG/1
TABLET ORAL
Qty: 30 TABLET | Refills: 0 | Status: SHIPPED | OUTPATIENT
Start: 2020-09-11 | End: 2022-03-22 | Stop reason: SDUPTHER

## 2020-09-11 NOTE — PROGRESS NOTES
Assessment/Plan:   1  Health maintenance -given influenza vaccine today  2  Anxiety depression - exacerbated by the coronavirus pandemic -much improved now that she is living with her family  She will continue current dose escitalopram and I made no adjustments in that rega  3  Fatigue -much of this related to her Parkinson's -  May have had a component related to benzodiazepine use  TSH normal-improved  4   Shortness of breath- benign exam   EKG stable   Rule out associated with Parkinson's   Able to ambulate in the [place] today without issues without to saturation -will continue to monitor but if this becomes more of an issue she will need additional evaluation including chest x-ray, echo and potential stress test to rule out ischemia variant although felt unlikely   5  Parkinson's disease -responsive to carbidopa 11 dopa   Saw Neurology who agreed with the diagnosis  Kerline Krishna are considering long-acting Sinemet before bed to aid with a m  Symptoms   At this point she will continue current regimen as her tremors relatively well controlled  6  Lumbar stenosis -MRI shows diffuse disease with moderate to severe stenosis and diffuse facet disease   Minimally responsive to oral steroids   Has tramadol for severe pain   Had epidurals via Neurosurgery no overall much improved    7  Osteoporosis-had received 2 doses of IV bisphosphonates through last dose on December 2013   Most recent bone density study shows lumbar spine false-positive and hip of-2 4   Refuses other treatment   Subsequent bone density study due in November 2017 and she did goal will be going now with urine for N tele peptide    DEXA scan done in May 2018 shows L-spine false positive 2 4 and hip of -2 4 with urine for N tele peptide at 23-holding off on other treatment at present   Subsequent bone density study in urine for N tele peptide needed in 2 years which would be June of 2020- scheduled today for follow-up DEXA scan in urine friend tele peptide  8   Low vitamin-D level-continue supplemen- at last visit she was increased to 3000 units daily vitamin-D level prior to this visit therapeutic  Paolochelsea Carrillo symptoms   Has a history of intermittent abdominal pain   CT scan benign   All labs normal   Endoscopy normal other than gastritis-stoppage itis   MRCP showed no common duct stone   Gastric emptying study area normal   Not using Reglan with history of depression   She has increasing symptoms may be candidate for done  Glennette Ast been on omeprazole but remains off that  10   Rhinitis-allergic versus perennial-uses Flonase nasal spray the  11   Breast carcinoma-as per prior discussion   She see surgery in reference to follow-up   Mammogram in December 2017 benign    mammogram in  January 2020 benign  12   Left lobe thyroid nodule   had prior aspiration   Follow-up ultrasound shows multiple nodules in surgery feels no other intervention is needed   See surgery yearly   TSH prior to this visit normal  13  Macrocytosis-longstanding -TSH normal    B12 level ordered             All other problems as per noted July 2014        MEDICAL REGIMEN:                                                  Carbidopa levodopa-25/100 taken t i d  an empty stomach, Lexapro 10 milligrams-   -half tablet twice daily which the patient preferslorazepam 0 5 milligrams half tablet b i d  P r n  Citracal D daily, vitamin D3/2000 units a day, baby aspirin Twice per week rare use of tramadol 50 milligrams half tablet every 8 hours as needed    Scheduled for DEXA scan her friend tele peptide  Day of DEXA will also have CBC random SMA vitamin B12 level  Appointment over the next 3 months     No problem-specific Assessment & Plan notes found for this encounter  Diagnoses and all orders for this visit:    Age-related osteoporosis without current pathological fracture  -     CBC and differential; Future  -     Comprehensive metabolic panel;  Future  -     Vitamin B12; Future  -     DXA bone density spine hip and pelvis; Future    Parkinson's disease ([facility] [address] )  -     CBC and differential; Future  -     Comprehensive metabolic panel; Future  -     Vitamin B12; Future    Macrocytosis without anemia  -     CBC and differential; Future  -     Comprehensive metabolic panel; Future  -     Vitamin B12; Future    Vitamin B12 deficiency  -     CBC and differential; Future  -     Comprehensive metabolic panel; Future  -     Vitamin B12; Future    Depression, unspecified depression type    Tremor          Subjective:      Patient ID: Shelli De Luna is a 80 y o  female  A week ago she moved in with her daughter and she appears remarkably improved to me  She said she is very alleviated that her family made the decision she should begin to stay with 1 of the  She was having issues at her assisted living facility  She had not been out times months with the pandemic etcetera  We had a long discussion today regarding this  She clearly appears to be improved  At prior visit we talked about increasing her dose of escitalopram but she remains stable on 10 milligrams daily which she prefers to take his 5 milligrams b i d  She has known osteoporosis  She is due for follow-up bone density study  She had prior treatment with bisphosphonates  She will have a DEXA scan as well as urine for in tele peptide  Most recent vitamin-D level of the last several months was normal         This patient denies any systemic symptoms  Specifically there has been no evidence of fever, night sweats, significant weight loss or significant decrease in appetite  She has known Parkinson's disease  She remains on carbidopa Leva dopa  She feels her degree of tremors unchanged  She notes intermittent fatigue which is likely related to that  She has been using benzodiazepines last visit I recommended she not do this  She has discontinued these and feels better overall     She has long-standing macrocytosis without anemia    TSH normal   Repeat B12 level ordered prior to next visit  Not a significant alcohol user  She is given influenza vaccine today  She has had pneumococcal vaccine as well as 2 doses of Shingrix vaccine      This patient wanted to know their preferred analgesic agent  Because of their various comorbidities I recommended that this be acetaminophen  This patient has no history of chronic liver disease that would put them at greater risk for use of acetaminophen  This patient may use up to 500-650 mg of acetaminophen at a time and no more than 3 g a day total  Nonsteroidal anti-inflammatory agents have the potential to exacerbate hypertension, hypercoagulability, chronic renal failure, congestive heart failure, and various allergic tendencies  We talked about this previously  She asked me for refill of tramadol- she uses a half tablet extremely infrequently which I told was okay  I do not want her to use the dose frequently  This was a 25 minutes visit with more than 50 percent of the time spent counseling the patient formulating a treatment plan  Multiple questions answered      The following portions of the patient's history were reviewed and updated as appropriate: allergies, current medications, past family history, past medical history, past social history, past surgical history and problem list     Review of Systems   Constitutional: Positive for fatigue  HENT: Negative  Respiratory: Negative  Cardiovascular: Negative  Gastrointestinal: Negative  Endocrine: Negative  Genitourinary: Negative  Musculoskeletal: Negative  Skin: Negative  Neurological: Positive for tremors  Hematological: Negative  Psychiatric/Behavioral: Negative  Objective:      Temp 97 9 °F (36 6 °C) (Oral)   Ht 5' 6" (1 676 m)   Wt 69 6 kg (153 lb 6 4 oz)   LMP  (LMP Unknown)   BMI 24 76 kg/m²          Physical Exam  Vitals signs reviewed     Constitutional:       General: She is not in acute distress  Appearance: Normal appearance  She is not ill-appearing, toxic-appearing or diaphoretic  HENT:      Head: Normocephalic and atraumatic  Right Ear: Tympanic membrane, ear canal and external ear normal       Left Ear: Tympanic membrane, ear canal and external ear normal       Nose: Nose normal  No congestion or rhinorrhea  Mouth/Throat:      Mouth: Mucous membranes are moist       Pharynx: Oropharynx is clear  No oropharyngeal exudate or posterior oropharyngeal erythema  Eyes:      General: No scleral icterus  Right eye: No discharge  Left eye: No discharge  Extraocular Movements: Extraocular movements intact  Pupils: Pupils are equal, round, and reactive to light  Neck:      Musculoskeletal: Normal range of motion and neck supple  No neck rigidity or muscular tenderness  Vascular: No carotid bruit  Cardiovascular:      Rate and Rhythm: Normal rate and regular rhythm  Pulses: Normal pulses  Heart sounds: Normal heart sounds  No murmur  No friction rub  No gallop  Pulmonary:      Effort: Pulmonary effort is normal  No respiratory distress  Breath sounds: Normal breath sounds  No stridor  No wheezing, rhonchi or rales  Chest:      Chest wall: No tenderness  Abdominal:      General: Abdomen is flat  Bowel sounds are normal  There is no distension  Palpations: Abdomen is soft  There is no mass  Tenderness: There is no abdominal tenderness  There is no right CVA tenderness, left CVA tenderness, guarding or rebound  Hernia: No hernia is present  Musculoskeletal: Normal range of motion  General: No swelling, tenderness, deformity or signs of injury  Right lower leg: No edema  Left lower leg: No edema  Lymphadenopathy:      Cervical: No cervical adenopathy  Skin:     General: Skin is warm and dry  Coloration: Skin is not jaundiced or pale  Findings: No bruising, erythema, lesion or rash  Neurological:      General: No focal deficit present  Mental Status: She is alert and oriented to person, place, and time  Mental status is at baseline  Cranial Nerves: No cranial nerve deficit  Sensory: No sensory deficit  Motor: No weakness  Coordination: Coordination normal       Gait: Gait normal       Deep Tendon Reflexes: Reflexes normal       Comments:  Pill rolling tremor  Bradykinesia  Parkinsonian gait   Psychiatric:         Mood and Affect: Mood normal          Behavior: Behavior normal          Thought Content:  Thought content normal          Judgment: Judgment normal

## 2020-09-23 ENCOUNTER — OFFICE VISIT (OUTPATIENT)
Dept: NEUROLOGY | Facility: CLINIC | Age: 85
End: 2020-09-23
Payer: MEDICARE

## 2020-09-23 VITALS
DIASTOLIC BLOOD PRESSURE: 74 MMHG | SYSTOLIC BLOOD PRESSURE: 122 MMHG | WEIGHT: 151 LBS | HEIGHT: 66 IN | HEART RATE: 82 BPM | BODY MASS INDEX: 24.27 KG/M2 | TEMPERATURE: 97.3 F

## 2020-09-23 DIAGNOSIS — G20 PARKINSON'S DISEASE (HCC): Primary | Chronic | ICD-10-CM

## 2020-09-23 PROCEDURE — 99214 OFFICE O/P EST MOD 30 MIN: CPT | Performed by: PSYCHIATRY & NEUROLOGY

## 2020-09-23 NOTE — PATIENT INSTRUCTIONS
Parkinson's Disease Resources     Helpful web sites   -  www InflowControl  org   -  www parkinson  org ([employer])   -  www [employer] ([address] for [employer]) - Order the "Every Victory Counts" helena under the "resources" tab  Or visit [facility] org/EV or call 818-920-8876  - [facility] [facility] [place]  org/resources/parkinsons-exercise-essentials   - Contact the [employer] for an "Aware in care kit" @ 1291.877.8572 (this is a kit to take with you if you ever have to go to the hospital)   - www pmdalliance  org  -  parkinsons  khang edu/exercise_videos  html  - [address] [facility] Exercise helpline: (842) 619-7020  - [employer]    - Check out "[employer] [address]" for help paying for your medications: www tafcares  org

## 2020-09-23 NOTE — PROGRESS NOTES
Assessment/Plan:    Parkinson's disease [facility])  24-year-old woman presents in follow-up for Parkinson's disease  Overall she is doing exceptionally well with a low dose of Sinemet  She does struggle some with her mood still and with fatigue overall she has not shown much evidence of decline or progression  Discussed different options regarding medication management and agreed to leave her medicines unchanged  Diagnoses and all orders for this visit:    Parkinson's disease ([facility] [address] )        Subjective:     Patient ID: Genevieve Zaragoza 80 y o  female    I had the pleasure of seeing your patient, Bri Rodriguez Levels the [facility] at the [facility]        To review, Wilian Cornejo is a rosalinda 80 year old right handed woman who presents in follow up for Parkinson's disease, symptom onset with right hand tremor which began at age 89  She does appear to be levodopa responsive  [facility] (pre-covid)     Current medications:  - Sinemet 25/100; 1 tab; TID  7a 1p 7p (additional QHS if needed)   - lexapro 10mg BID      Interval history: here with her daughter   Today is a good day  Some days she is very tired and its not a good day   Not much change otherwise  Living with her daughter now  This seems to be going well  Less anxiety now  Eating well  Tries to walk as much as possible  A couple of days of back pain  Some knee pain  Drinking more water  The following portions of the patient's history were reviewed and updated as appropriate: allergies, current medications, past family history, past medical history, past social history, past surgical history and problem list     Objective:  /74   Pulse 82   Temp (!) 97 3 °F (36 3 °C) (Temporal)   Ht 5' 6" (1 676 m)   Wt 68 5 kg (151 lb)   LMP  (LMP Unknown)   BMI 24 37 kg/m²     Physical Exam    Neurological Exam  GENERAL MEDICAL EXAMINATION:  General appearance: alert, in no apparent distress  Appropriately dressed and groomed  Conversing and interacting appropriately  Eyes: Sclera are non-injected  Ears, nose, Mouth, Throat: Mucous membranes are moist    Resp: Breathing comfortably on RA   Musculoskeletal: No evidence of deformities  No contractures  No Edema  Skin: No visible rashes  Warm and well perfused  Psych: normal and appropriate affect     Mental Status:  Alert and oriented to person place and time  Able to relate history without difficulty  Attentive to conversation  Language is fluent and appropriate with normal prosody  There were no paraphasic errors  Speech was not dysarthric  Able to follow both midline and appendicular commands       General Neurology examination:     UPDRS motor:                              Time since last dose:       Speech  1     Facial Expression  +     Rigidity - Neck       Rigidity - Upper Extremity (Right)  1     Rigidity - Upper Extremity (Left)   0     Rigidity - Lower Extremity (Right)  0     Rigidity - Lower Extremity (Left)   0     Finger Taps (Right)   3     Finger Taps (Left)   2     Hand Movement (Right)  2     Hand Movement (Left)   2     Pronation/Supination (Right)  1     Pronation/Supination (Left)   1     Toe Tapping (Right) 1     Toe Tapping (Left) 0     Leg Agility (Right)  1     Leg Agility (Left)   0     Arising from Chair   1     Gait   3 cane    Freezing of Gait 0     Postural Stability        Posture 2     Global spontaneity of movement 0     Postural Tremor (Right) 0     Postural Tremor (Left) 0     Kinetic Tremor (Right)  1     Kinetic Tremor (Left)  0     Rest tremor amplitude RUE 3     Rest tremor amplitude LUE 0     Rest tremor amplitude RLE 0     Reset tremor amplitude LLE 0     Lip/Jaw Tremor  0     Consistency of tremor 0     Motor Exam Total:          Dysmetria: none on FNF  Dyskinesia: maybe a little in the right foot   Dystonia: right foot flicks out during the swing phase     Stance: narrow-based and stable   Stride: only mildly slowed speed, mildly reduced stride length, step height  walks with a walker   Turn: stable, 2-3 steps       Review of Systems   Constitutional: Negative  Negative for appetite change and fever  HENT: Negative  Negative for hearing loss, tinnitus, trouble swallowing and voice change  Eyes: Negative  Negative for photophobia and pain  Respiratory: Negative  Negative for shortness of breath  Cardiovascular: Negative  Negative for palpitations  Gastrointestinal: Negative  Negative for nausea and vomiting  Endocrine: Negative  Negative for cold intolerance  Genitourinary: Negative  Negative for dysuria, frequency and urgency  Musculoskeletal: Positive for gait problem  Negative for myalgias and neck pain  Skin: Negative  Negative for rash  Allergic/Immunologic: Negative  Neurological: Positive for tremors  Negative for dizziness, seizures, syncope, facial asymmetry, speech difficulty, weakness, light-headedness, numbness and headaches  Hematological: Negative  Does not bruise/bleed easily  Psychiatric/Behavioral: Negative  Negative for confusion, hallucinations and sleep disturbance  The above ROS was reviewed and updated       Current Outpatient Medications on File Prior to Visit   Medication Sig Dispense Refill    acetaminophen (TYLENOL) 325 mg tablet Take 325 mg by mouth 2 (two) times a day before breakfast and lunch      calcium citrate-vitamin D (CITRACAL+D) 315-200 MG-UNIT per tablet Take 1 tablet by mouth daily      carbidopa-levodopa (SINEMET)  mg per tablet Take 1 tablet by mouth 3 (three) times a day 270 tablet 3    chlorhexidine (PERIDEX) 0 12 % solution Use as directed  0    cholecalciferol (VITAMIN D3) 1,000 units tablet Take 3,000 Units by mouth daily       escitalopram (LEXAPRO) 10 mg tablet Take 1 tablet (10 mg total) by mouth daily Take 1/2 tablet twice daily (Patient taking differently: Take 10 mg by mouth daily 2 tabs BID) 90 tablet 0    traMADol (ULTRAM) 50 mg tablet Take 1/2 tab twice daily prn- Severe pain 30 tablet 0    aspirin 81 mg chewable tablet Chew 81 mg daily      LORazepam (ATIVAN) 0 5 mg tablet TAKE 1 TABLET BY MOUTH TWO TIMES DAILY (Patient not taking: Reported on 6/25/2020) 60 tablet 0     No current facility-administered medications on file prior to visit          Driss Serrato MD  Medical Director   [facility]  [facility] Specialist

## 2020-09-23 NOTE — ASSESSMENT & PLAN NOTE
61-year-old woman presents in follow-up for Parkinson's disease  Overall she is doing exceptionally well with a low dose of Sinemet  She does struggle some with her mood still and with fatigue overall she has not shown much evidence of decline or progression  Discussed different options regarding medication management and agreed to leave her medicines unchanged

## 2020-11-08 ENCOUNTER — HOSPITAL ENCOUNTER (EMERGENCY)
Facility: HOSPITAL | Age: 85
Discharge: HOME | End: 2020-11-09
Attending: EMERGENCY MEDICINE | Admitting: EMERGENCY MEDICINE
Payer: MEDICARE

## 2020-11-08 VITALS
OXYGEN SATURATION: 96 % | SYSTOLIC BLOOD PRESSURE: 197 MMHG | BODY MASS INDEX: 24.37 KG/M2 | HEART RATE: 77 BPM | WEIGHT: 151 LBS | TEMPERATURE: 97.8 F | RESPIRATION RATE: 18 BRPM | DIASTOLIC BLOOD PRESSURE: 92 MMHG

## 2020-11-08 DIAGNOSIS — R34 DECREASED URINATION: Primary | ICD-10-CM

## 2020-11-08 LAB
ANION GAP SERPL CALCULATED.3IONS-SCNC: 8 MMOL/L (ref 4–13)
BACTERIA UR QL AUTO: NORMAL /HPF
BASOPHILS # BLD AUTO: 0.03 THOUSANDS/ΜL (ref 0–0.1)
BASOPHILS NFR BLD AUTO: 1 % (ref 0–1)
BILIRUB UR QL STRIP: NEGATIVE
BUN SERPL-MCNC: 17 MG/DL (ref 5–25)
CALCIUM SERPL-MCNC: 8.9 MG/DL (ref 8.3–10.1)
CHLORIDE SERPL-SCNC: 107 MMOL/L (ref 100–108)
CLARITY UR: CLEAR
CO2 SERPL-SCNC: 27 MMOL/L (ref 21–32)
COLOR UR: ABNORMAL
CREAT SERPL-MCNC: 0.9 MG/DL (ref 0.6–1.3)
EOSINOPHIL # BLD AUTO: 0.21 THOUSAND/ΜL (ref 0–0.61)
EOSINOPHIL NFR BLD AUTO: 3 % (ref 0–6)
ERYTHROCYTE [DISTWIDTH] IN BLOOD BY AUTOMATED COUNT: 12.8 % (ref 11.6–15.1)
GFR SERPL CREATININE-BSD FRML MDRD: 55 ML/MIN/1.73SQ M
GLUCOSE SERPL-MCNC: 100 MG/DL (ref 65–140)
GLUCOSE UR STRIP-MCNC: NEGATIVE MG/DL
HCT VFR BLD AUTO: 40.8 % (ref 34.8–46.1)
HGB BLD-MCNC: 13.4 G/DL (ref 11.5–15.4)
HGB UR QL STRIP.AUTO: NEGATIVE
IMM GRANULOCYTES # BLD AUTO: 0.02 THOUSAND/UL (ref 0–0.2)
IMM GRANULOCYTES NFR BLD AUTO: 0 % (ref 0–2)
KETONES UR STRIP-MCNC: NEGATIVE MG/DL
LEUKOCYTE ESTERASE UR QL STRIP: ABNORMAL
LYMPHOCYTES # BLD AUTO: 2.72 THOUSANDS/ΜL (ref 0.6–4.47)
LYMPHOCYTES NFR BLD AUTO: 43 % (ref 14–44)
MCH RBC QN AUTO: 31.4 PG (ref 26.8–34.3)
MCHC RBC AUTO-ENTMCNC: 32.8 G/DL (ref 31.4–37.4)
MCV RBC AUTO: 96 FL (ref 82–98)
MONOCYTES # BLD AUTO: 0.58 THOUSAND/ΜL (ref 0.17–1.22)
MONOCYTES NFR BLD AUTO: 9 % (ref 4–12)
NEUTROPHILS # BLD AUTO: 2.78 THOUSANDS/ΜL (ref 1.85–7.62)
NEUTS SEG NFR BLD AUTO: 44 % (ref 43–75)
NITRITE UR QL STRIP: NEGATIVE
NON-SQ EPI CELLS URNS QL MICRO: NORMAL /HPF
NRBC BLD AUTO-RTO: 0 /100 WBCS
PH UR STRIP.AUTO: 6 [PH]
PLATELET # BLD AUTO: 257 THOUSANDS/UL (ref 149–390)
PMV BLD AUTO: 9.2 FL (ref 8.9–12.7)
POTASSIUM SERPL-SCNC: 4 MMOL/L (ref 3.5–5.3)
PROT UR STRIP-MCNC: NEGATIVE MG/DL
RBC # BLD AUTO: 4.27 MILLION/UL (ref 3.81–5.12)
RBC #/AREA URNS AUTO: NORMAL /HPF
SODIUM SERPL-SCNC: 142 MMOL/L (ref 136–145)
SP GR UR STRIP.AUTO: 1.01 (ref 1–1.03)
UROBILINOGEN UR QL STRIP.AUTO: 0.2 E.U./DL
WBC # BLD AUTO: 6.34 THOUSAND/UL (ref 4.31–10.16)
WBC #/AREA URNS AUTO: NORMAL /HPF

## 2020-11-08 PROCEDURE — 81001 URINALYSIS AUTO W/SCOPE: CPT | Performed by: EMERGENCY MEDICINE

## 2020-11-08 PROCEDURE — 99283 EMERGENCY DEPT VISIT LOW MDM: CPT

## 2020-11-08 PROCEDURE — 80048 BASIC METABOLIC PNL TOTAL CA: CPT | Performed by: EMERGENCY MEDICINE

## 2020-11-08 PROCEDURE — 99282 EMERGENCY DEPT VISIT SF MDM: CPT | Performed by: EMERGENCY MEDICINE

## 2020-11-08 PROCEDURE — 96360 HYDRATION IV INFUSION INIT: CPT

## 2020-11-08 PROCEDURE — 36415 COLL VENOUS BLD VENIPUNCTURE: CPT | Performed by: EMERGENCY MEDICINE

## 2020-11-08 PROCEDURE — 85025 COMPLETE CBC W/AUTO DIFF WBC: CPT | Performed by: EMERGENCY MEDICINE

## 2020-11-08 RX ADMIN — SODIUM CHLORIDE 1000 ML: 0.9 INJECTION, SOLUTION INTRAVENOUS at 22:21

## 2020-11-09 ENCOUNTER — HOSPITAL ENCOUNTER (OUTPATIENT)
Dept: RADIOLOGY | Age: 85
Discharge: HOME | End: 2020-11-09
Payer: MEDICARE

## 2020-11-09 ENCOUNTER — LAB (OUTPATIENT)
Dept: LAB | Age: 85
End: 2020-11-09
Payer: MEDICARE

## 2020-11-09 DIAGNOSIS — E53.8 VITAMIN B12 DEFICIENCY: ICD-10-CM

## 2020-11-09 DIAGNOSIS — G20 PARKINSON'S DISEASE (HCC): ICD-10-CM

## 2020-11-09 DIAGNOSIS — M81.0 AGE-RELATED OSTEOPOROSIS WITHOUT CURRENT PATHOLOGICAL FRACTURE: ICD-10-CM

## 2020-11-09 DIAGNOSIS — D75.89 MACROCYTOSIS WITHOUT ANEMIA: ICD-10-CM

## 2020-11-09 DIAGNOSIS — M81.0 AGE-RELATED OSTEOPOROSIS WITHOUT CURRENT PATHOLOGICAL FRACTURE: Chronic | ICD-10-CM

## 2020-11-09 PROCEDURE — 82570 ASSAY OF URINE CREATININE: CPT

## 2020-11-09 PROCEDURE — 82523 COLLAGEN CROSSLINKS: CPT

## 2020-11-09 PROCEDURE — 77080 DXA BONE DENSITY AXIAL: CPT

## 2020-11-11 ENCOUNTER — OFFICE VISIT (OUTPATIENT)
Dept: INTERNAL MEDICINE CLINIC | Facility: CLINIC | Age: 85
End: 2020-11-11
Payer: MEDICARE

## 2020-11-11 VITALS
RESPIRATION RATE: 14 BRPM | WEIGHT: 153.4 LBS | SYSTOLIC BLOOD PRESSURE: 128 MMHG | HEIGHT: 66 IN | DIASTOLIC BLOOD PRESSURE: 78 MMHG | BODY MASS INDEX: 24.65 KG/M2 | HEART RATE: 72 BPM

## 2020-11-11 DIAGNOSIS — I10 ESSENTIAL HYPERTENSION: Chronic | ICD-10-CM

## 2020-11-11 DIAGNOSIS — G20 PARKINSON'S DISEASE (HCC): Chronic | ICD-10-CM

## 2020-11-11 DIAGNOSIS — R39.198 DIFFICULTY URINATING: Primary | ICD-10-CM

## 2020-11-11 DIAGNOSIS — F41.8 DEPRESSION WITH ANXIETY: Chronic | ICD-10-CM

## 2020-11-11 PROCEDURE — 99214 OFFICE O/P EST MOD 30 MIN: CPT | Performed by: INTERNAL MEDICINE

## 2020-11-12 LAB
COLLAGEN NTX UR-SCNC: 145 NMOL BCE
COLLAGEN NTX/CREAT UR-SRTO: 45 NM BCE/MM CR (ref 0–89)
CREAT UR-MCNC: 36.4 MG/DL
INTERPRETIVE GUIDE:: NORMAL

## 2020-11-17 DIAGNOSIS — F41.8 DEPRESSION WITH ANXIETY: ICD-10-CM

## 2020-11-19 DIAGNOSIS — G20 PARKINSON'S DISEASE (HCC): Chronic | ICD-10-CM

## 2020-11-19 DIAGNOSIS — F41.8 DEPRESSION WITH ANXIETY: ICD-10-CM

## 2020-11-19 RX ORDER — ESCITALOPRAM OXALATE 10 MG/1
TABLET ORAL
Qty: 90 TABLET | Refills: 3 | Status: SHIPPED | OUTPATIENT
Start: 2020-11-19 | End: 2021-11-23

## 2020-12-08 ENCOUNTER — LAB (OUTPATIENT)
Dept: LAB | Age: 85
End: 2020-12-08
Payer: MEDICARE

## 2020-12-08 DIAGNOSIS — E53.8 VITAMIN B12 DEFICIENCY: ICD-10-CM

## 2020-12-08 DIAGNOSIS — M81.0 AGE-RELATED OSTEOPOROSIS WITHOUT CURRENT PATHOLOGICAL FRACTURE: Chronic | ICD-10-CM

## 2020-12-08 DIAGNOSIS — G20 PARKINSON'S DISEASE (HCC): ICD-10-CM

## 2020-12-08 DIAGNOSIS — D75.89 MACROCYTOSIS WITHOUT ANEMIA: ICD-10-CM

## 2020-12-08 LAB
ALBUMIN SERPL BCP-MCNC: 3.5 G/DL (ref 3.5–5)
ALP SERPL-CCNC: 75 U/L (ref 46–116)
ALT SERPL W P-5'-P-CCNC: 7 U/L (ref 12–78)
ANION GAP SERPL CALCULATED.3IONS-SCNC: 5 MMOL/L (ref 4–13)
AST SERPL W P-5'-P-CCNC: 12 U/L (ref 5–45)
BASOPHILS # BLD AUTO: 0.03 THOUSANDS/ΜL (ref 0–0.1)
BASOPHILS NFR BLD AUTO: 1 % (ref 0–1)
BILIRUB SERPL-MCNC: 0.6 MG/DL (ref 0.2–1)
BUN SERPL-MCNC: 17 MG/DL (ref 5–25)
CALCIUM SERPL-MCNC: 9.6 MG/DL (ref 8.3–10.1)
CHLORIDE SERPL-SCNC: 108 MMOL/L (ref 100–108)
CO2 SERPL-SCNC: 30 MMOL/L (ref 21–32)
CREAT SERPL-MCNC: 0.79 MG/DL (ref 0.6–1.3)
EOSINOPHIL # BLD AUTO: 0.17 THOUSAND/ΜL (ref 0–0.61)
EOSINOPHIL NFR BLD AUTO: 3 % (ref 0–6)
ERYTHROCYTE [DISTWIDTH] IN BLOOD BY AUTOMATED COUNT: 12.8 % (ref 11.6–15.1)
GFR SERPL CREATININE-BSD FRML MDRD: 64 ML/MIN/1.73SQ M
GLUCOSE SERPL-MCNC: 91 MG/DL (ref 65–140)
HCT VFR BLD AUTO: 44.6 % (ref 34.8–46.1)
HGB BLD-MCNC: 14.8 G/DL (ref 11.5–15.4)
IMM GRANULOCYTES # BLD AUTO: 0.02 THOUSAND/UL (ref 0–0.2)
IMM GRANULOCYTES NFR BLD AUTO: 0 % (ref 0–2)
LYMPHOCYTES # BLD AUTO: 1.76 THOUSANDS/ΜL (ref 0.6–4.47)
LYMPHOCYTES NFR BLD AUTO: 29 % (ref 14–44)
MCH RBC QN AUTO: 31.6 PG (ref 26.8–34.3)
MCHC RBC AUTO-ENTMCNC: 33.2 G/DL (ref 31.4–37.4)
MCV RBC AUTO: 95 FL (ref 82–98)
MONOCYTES # BLD AUTO: 0.67 THOUSAND/ΜL (ref 0.17–1.22)
MONOCYTES NFR BLD AUTO: 11 % (ref 4–12)
NEUTROPHILS # BLD AUTO: 3.35 THOUSANDS/ΜL (ref 1.85–7.62)
NEUTS SEG NFR BLD AUTO: 56 % (ref 43–75)
NRBC BLD AUTO-RTO: 0 /100 WBCS
PLATELET # BLD AUTO: 261 THOUSANDS/UL (ref 149–390)
PMV BLD AUTO: 9.4 FL (ref 8.9–12.7)
POTASSIUM SERPL-SCNC: 3.9 MMOL/L (ref 3.5–5.3)
PROT SERPL-MCNC: 7.1 G/DL (ref 6.4–8.2)
RBC # BLD AUTO: 4.68 MILLION/UL (ref 3.81–5.12)
SODIUM SERPL-SCNC: 143 MMOL/L (ref 136–145)
VIT B12 SERPL-MCNC: 384 PG/ML (ref 100–900)
WBC # BLD AUTO: 6 THOUSAND/UL (ref 4.31–10.16)

## 2020-12-08 PROCEDURE — 36415 COLL VENOUS BLD VENIPUNCTURE: CPT

## 2020-12-08 PROCEDURE — 82607 VITAMIN B-12: CPT

## 2020-12-08 PROCEDURE — 85025 COMPLETE CBC W/AUTO DIFF WBC: CPT

## 2020-12-08 PROCEDURE — 80053 COMPREHEN METABOLIC PANEL: CPT

## 2020-12-14 ENCOUNTER — OFFICE VISIT (OUTPATIENT)
Dept: INTERNAL MEDICINE CLINIC | Facility: CLINIC | Age: 85
End: 2020-12-14
Payer: MEDICARE

## 2020-12-14 VITALS
SYSTOLIC BLOOD PRESSURE: 130 MMHG | HEART RATE: 72 BPM | HEIGHT: 66 IN | WEIGHT: 153.6 LBS | DIASTOLIC BLOOD PRESSURE: 80 MMHG | TEMPERATURE: 97.5 F | RESPIRATION RATE: 14 BRPM | BODY MASS INDEX: 24.68 KG/M2

## 2020-12-14 DIAGNOSIS — R53.83 OTHER FATIGUE: ICD-10-CM

## 2020-12-14 DIAGNOSIS — G20 PARKINSON'S DISEASE (HCC): Chronic | ICD-10-CM

## 2020-12-14 DIAGNOSIS — F41.8 DEPRESSION WITH ANXIETY: Primary | Chronic | ICD-10-CM

## 2020-12-14 DIAGNOSIS — I10 ESSENTIAL HYPERTENSION: Chronic | ICD-10-CM

## 2020-12-14 DIAGNOSIS — E55.9 VITAMIN D DEFICIENCY: ICD-10-CM

## 2020-12-14 DIAGNOSIS — E03.9 HYPOTHYROIDISM, UNSPECIFIED TYPE: Chronic | ICD-10-CM

## 2020-12-14 PROBLEM — Z79.83 ENCOUNTER FOR ONGOING OSTEOPOROSIS BISPHOSPHONATE THERAPY: Status: RESOLVED | Noted: 2018-01-24 | Resolved: 2020-12-14

## 2020-12-14 PROBLEM — M81.0 ENCOUNTER FOR ONGOING OSTEOPOROSIS BISPHOSPHONATE THERAPY: Status: RESOLVED | Noted: 2018-01-24 | Resolved: 2020-12-14

## 2020-12-14 PROCEDURE — 99214 OFFICE O/P EST MOD 30 MIN: CPT | Performed by: INTERNAL MEDICINE

## 2021-01-22 ENCOUNTER — IMMUNIZATIONS (OUTPATIENT)
Dept: FAMILY MEDICINE CLINIC | Facility: HOSPITAL | Age: 86
End: 2021-01-22

## 2021-01-22 DIAGNOSIS — Z23 ENCOUNTER FOR IMMUNIZATION: Primary | ICD-10-CM

## 2021-01-22 PROCEDURE — 91300 SARS-COV-2 / COVID-19 MRNA VACCINE (PFIZER-BIONTECH) 30 MCG: CPT

## 2021-01-22 PROCEDURE — 0001A SARS-COV-2 / COVID-19 MRNA VACCINE (PFIZER-BIONTECH) 30 MCG: CPT

## 2021-02-12 ENCOUNTER — IMMUNIZATIONS (OUTPATIENT)
Dept: FAMILY MEDICINE CLINIC | Facility: HOSPITAL | Age: 86
End: 2021-02-12

## 2021-02-12 DIAGNOSIS — Z23 ENCOUNTER FOR IMMUNIZATION: Primary | ICD-10-CM

## 2021-02-12 PROCEDURE — 91300 SARS-COV-2 / COVID-19 MRNA VACCINE (PFIZER-BIONTECH) 30 MCG: CPT

## 2021-02-12 PROCEDURE — 0002A SARS-COV-2 / COVID-19 MRNA VACCINE (PFIZER-BIONTECH) 30 MCG: CPT

## 2021-03-02 ENCOUNTER — OFFICE VISIT (OUTPATIENT)
Dept: NEUROLOGY | Facility: CLINIC | Age: 86
End: 2021-03-02
Payer: MEDICARE

## 2021-03-02 VITALS
WEIGHT: 157.9 LBS | HEART RATE: 65 BPM | BODY MASS INDEX: 25.38 KG/M2 | HEIGHT: 66 IN | DIASTOLIC BLOOD PRESSURE: 68 MMHG | SYSTOLIC BLOOD PRESSURE: 126 MMHG

## 2021-03-02 DIAGNOSIS — G47.52 REM BEHAVIORAL DISORDER: Primary | ICD-10-CM

## 2021-03-02 DIAGNOSIS — G20 PARKINSON DISEASE (HCC): ICD-10-CM

## 2021-03-02 PROCEDURE — 99214 OFFICE O/P EST MOD 30 MIN: CPT | Performed by: PSYCHIATRY & NEUROLOGY

## 2021-03-02 NOTE — PROGRESS NOTES
Assessment/Plan:    Parkinson's disease [facility])  51-year-old woman presents in follow-up for Parkinson's disease  She continues to do exceptionally well on a low dose of carbidopa/ levodopa  She has seen some progression in her symptoms, mostly increase tremor in the right hand in some slowed gait at times  She did not want to add on any new medications or increase the dose of her standing Sinemet however she was agreeable to adding half tablet of Sinemet as needed for her off days  Follow up in 4 months with Dr Romel Witt PA-C      Diagnoses and all orders for this visit:    REM behavioral disorder    Parkinson disease ([facility] [address] )        Subjective:     Patient ID: Sukhdev Ordoñez 80 y o  female    I had the pleasure of seeing your patient, Bri Don the Movement Disorders Clinic at the [facility]        To review, Bri Bradford is a rosalinda 80 year old right handed woman who presents in follow up for Parkinson's disease, symptom onset with right hand tremor which began at age 89  She does appear to be levodopa responsive  [facility] (pre-covid)     Current medications:  - Sinemet 25/100; 1 tab; TID  7a 1p 7p (additional QHS if needed)   - lexapro 10mg BID      Interval history: here with her daughter   Some decline  Good days and bad days   Fatigue is a big issue  Afternoons and evenings are the best    Tremor is a bit worse in the AM    Walking very slowly on her bad days  Sometimes it feels like someone is pushing down on her shoulders  Naps a lot on those days  Arthritic pain  Got both rounds of COVID vaccine       The following portions of the patient's history were reviewed and updated as appropriate: allergies, current medications, past family history, past medical history, past social history, past surgical history and problem list     Objective:  /68 (BP Location: Left arm, Patient Position: Standing, Cuff Size: Standard)   Pulse 65   Ht 5' 6" (1 676 m)   Wt 71 6 kg (157 lb 14 4 oz)   LMP  (LMP Unknown)   BMI 25 49 kg/m²     Physical Exam    Neurological Exam  GENERAL MEDICAL EXAMINATION:  General appearance: alert, in no apparent distress  Appropriately dressed and groomed  Conversing and interacting appropriately  Eyes: Sclera are non-injected  Ears, nose, Mouth, Throat: Mucous membranes are moist    Resp: Breathing comfortably on RA   Musculoskeletal: No evidence of deformities  No contractures  No Edema  Skin: No visible rashes  Warm and well perfused  Psych: normal and appropriate affect     Mental Status:  Able to relate history without difficulty  Attentive to conversation  Language is fluent and appropriate with normal prosody  There were no paraphasic errors  Speech was not dysarthric  Able to follow both midline and appendicular commands       General Neurology examination:     UPDRS motor:                              Time since last dose:       Speech  0     Facial Expression  0     Rigidity - Neck  0     Rigidity - Upper Extremity (Right)  0     Rigidity - Upper Extremity (Left)   0     Rigidity - Lower Extremity (Right)  0     Rigidity - Lower Extremity (Left)   0     Finger Taps (Right)   2     Finger Taps (Left)   2     Hand Movement (Right)  1     Hand Movement (Left)   1     Pronation/Supination (Right)  2     Pronation/Supination (Left)   1     Toe Tapping (Right) 1     Toe Tapping (Left) 0     Leg Agility (Right)  1     Leg Agility (Left)   0     Arising from Chair   2     Gait   2     Freezing of Gait 0     Postural Stability        Posture 2     Global spontaneity of movement 1     Postural Tremor (Right) 0     Postural Tremor (Left) 0     Kinetic Tremor (Right)  0     Kinetic Tremor (Left)  0     Rest tremor amplitude RUE 3     Rest tremor amplitude LUE 0     Rest tremor amplitude RLE 0     Reset tremor amplitude LLE 0     Lip/Jaw Tremor  2     Consistency of tremor 3     Motor Exam Total:          Dysmetria: none on FNF  Dyskinesia: none  Dystonia: none   Myoclonus: none       Review of Systems   Constitutional: Positive for fatigue  Negative for appetite change and fever  HENT: Negative  Negative for hearing loss, tinnitus, trouble swallowing and voice change  Eyes: Negative  Negative for photophobia and pain  Respiratory: Negative  Negative for shortness of breath  Cardiovascular: Negative  Negative for palpitations  Gastrointestinal: Negative  Negative for nausea and vomiting  Endocrine: Negative  Negative for cold intolerance  Genitourinary: Negative  Negative for dysuria, frequency and urgency  Musculoskeletal: Positive for back pain  Negative for myalgias and neck pain  Skin: Negative  Negative for rash  Allergic/Immunologic: Negative  Neurological: Positive for weakness  Negative for dizziness, tremors, seizures, syncope, facial asymmetry, speech difficulty, light-headedness, numbness and headaches  Hematological: Negative  Does not bruise/bleed easily  Psychiatric/Behavioral: Negative  Negative for confusion, hallucinations and sleep disturbance  All other systems reviewed and are negative  The above ROS was reviewed and updated       Current Outpatient Medications on File Prior to Visit   Medication Sig Dispense Refill    acetaminophen (TYLENOL) 325 mg tablet Take 325 mg by mouth 2 (two) times a day before breakfast and lunch      calcium citrate-vitamin D (CITRACAL+D) 315-200 MG-UNIT per tablet Take 1 tablet by mouth daily      carbidopa-levodopa (SINEMET)  mg per tablet TAKE 1 TABLET BY MOUTH THREE TIMES DAILY 270 tablet 3    cholecalciferol (VITAMIN D3) 1,000 units tablet Take 3,000 Units by mouth daily       escitalopram (LEXAPRO) 10 mg tablet TAKE 1/2 TABLET BY MOUTH TWO TIMES DAILY 90 tablet 3    traMADol (ULTRAM) 50 mg tablet Take 1/2 tab twice daily prn- Severe pain 30 tablet 0    chlorhexidine (PERIDEX) 0 12 % solution Use as directed  0    LORazepam (ATIVAN) 0 5 mg tablet TAKE 1 TABLET BY MOUTH TWO TIMES DAILY (Patient not taking: Reported on 6/25/2020) 60 tablet 0    [DISCONTINUED] aspirin 81 mg chewable tablet Chew 81 mg daily       No current facility-administered medications on file prior to visit          Marina Rodriguez MD  Medical Director   Movement [address]  [facility]

## 2021-03-02 NOTE — PATIENT INSTRUCTIONS
Parkinson's Disease Resources     Helpful web sites   -  www [employer]  org   -  www parkinson  org ([employer])   -  www [employer] ([address] for [employer]) - Order the "Every Victory Counts" helena under the "resources" tab  Or visit [facility] org/EVC or call [phone]  - [facility]  org/resources/parkinsons-exercise-essentials   - Contact the [employer] for an "Aware in care kit" @ 6372.504.1573 (this is a kit to take with you if you ever have to go to the hospital)   - www pmdalliance  org  -  parkinsons  khang edu/exercise_videos  html  - [address] [facility] Exercise helpline: (223) 378-5767  - [employer]    - Check out "[employer]" for help paying for your medications: www tafcares  org

## 2021-03-02 NOTE — ASSESSMENT & PLAN NOTE
51-year-old woman presents in follow-up for Parkinson's disease  She continues to do exceptionally well on a low dose of carbidopa/ levodopa  She has seen some progression in her symptoms, mostly increase tremor in the right hand in some slowed gait at times  She did not want to add on any new medications or increase the dose of her standing Sinemet however she was agreeable to adding half tablet of Sinemet as needed for her off days      Follow up in 4 months with Dr Maximus Tabor PA-C

## 2021-05-04 ENCOUNTER — OFFICE VISIT (OUTPATIENT)
Dept: SURGERY | Facility: CLINIC | Age: 86
End: 2021-05-04
Payer: MEDICARE

## 2021-05-04 DIAGNOSIS — C50.919 BREAST CANCER (HCC): Primary | Chronic | ICD-10-CM

## 2021-05-04 PROCEDURE — 99213 OFFICE O/P EST LOW 20 MIN: CPT | Performed by: SURGERY

## 2021-05-04 NOTE — PROGRESS NOTES
Assessment/Plan:Mrs Lashay Phillips has a history of left breast ultrasound-guided core biopsy in April 2011 revealing invasive ductal breast carcinoma  May 2011 underwent left mastectomy and sentinel lymph node biopsy  Pathology reveals a stage IA, T1, N0, G2, hormone receptor positive breast cancer  She returns today for follow-up visit  She had declined updated mammograms due to her age and overall health  She denies any change in her self exam   Denies any nipple discharge or breast masses  Physical examination is unremarkable  No dominant masses are noted on chest examination  No evidence for recurrence  She has declined further follow-up visits citing her agent health  I believe that this is reasonable  Diagnoses and all orders for this visit:    [facility]        Subjective:      Patient ID: Stacie Walsh is a 80 y o  female  Patient presents for yearly breast exam   Denies pain/problems/changes  The following portions of the patient's history were reviewed and updated as appropriate:     She  has a past medical history of Anxiety, Arthritis, Gastroparesis, History of atherosclerosis, History of breast cancer, History of osteoarthritis, Long term use of drug, Malignant neoplasm of breast ([facility] [address] ) (04/11/2011), and Osteomalacia  She  has a past surgical history that includes Hysterectomy (1974); Breast biopsy (Left, 04/11/2011); and Mastectomy (Left, 05/04/2011)  Her family history includes Coronary artery disease in her family and mother; Hyperlipidemia in her family; Lung cancer (age of onset: 48) in her sister; Osteoarthritis in her family  She  reports that she has never smoked  She has never used smokeless tobacco  She reports current alcohol use  She reports that she does not use drugs    Current Outpatient Medications   Medication Sig Dispense Refill    acetaminophen (TYLENOL) 325 mg tablet Take 325 mg by mouth 2 (two) times a day before breakfast and lunch      calcium citrate-vitamin D (CITRACAL+D) 315-200 MG-UNIT per tablet Take 1 tablet by mouth daily      carbidopa-levodopa (SINEMET)  mg per tablet TAKE 1 TABLET BY MOUTH THREE TIMES DAILY 270 tablet 3    chlorhexidine (PERIDEX) 0 12 % solution Use as directed  0    cholecalciferol (VITAMIN D3) 1,000 units tablet Take 3,000 Units by mouth daily       escitalopram (LEXAPRO) 10 mg tablet TAKE 1/2 TABLET BY MOUTH TWO TIMES DAILY 90 tablet 3    LORazepam (ATIVAN) 0 5 mg tablet TAKE 1 TABLET BY MOUTH TWO TIMES DAILY (Patient not taking: Reported on 6/25/2020) 60 tablet 0    traMADol (ULTRAM) 50 mg tablet Take 1/2 tab twice daily prn- Severe pain 30 tablet 0     No current facility-administered medications for this visit  She has No Known Allergies       Review of Systems   Constitutional: Positive for fatigue  Negative for activity change and fever  HENT: Negative  Eyes: Negative  Respiratory: Negative  Cardiovascular: Negative  Gastrointestinal: Negative  Endocrine: Negative  Genitourinary: Negative  Musculoskeletal: Negative  Skin: Negative  Presents with pedunculated skin lesion over the upper inner right thigh  This is consistent with a fibroepithelial polyp  Allergic/Immunologic: Negative  Neurological: Positive for tremors and weakness  Psychiatric/Behavioral: Negative for agitation, behavioral problems and confusion  The patient is not nervous/anxious  Objective:      LMP  (LMP Unknown)          Physical Exam  Constitutional:       Appearance: Normal appearance  She is well-developed  HENT:      Head: Normocephalic and atraumatic  Nose: Nose normal    Eyes:      Extraocular Movements: Extraocular movements intact  Conjunctiva/sclera: Conjunctivae normal    Neck:      Musculoskeletal: Normal range of motion  Cardiovascular:      Rate and Rhythm: Normal rate and regular rhythm  Heart sounds: Normal heart sounds     Pulmonary:      Effort: Pulmonary effort is normal       Breath sounds: Normal breath sounds  Chest:       Abdominal:      General: Abdomen is flat  Musculoskeletal: Normal range of motion  Skin:     General: Skin is warm and dry  Neurological:      Mental Status: She is alert and oriented to person, place, and time  Comments: Baseline tremor secondary to Parkinson's disease     Psychiatric:         Mood and Affect: Mood normal

## 2021-07-21 NOTE — PROGRESS NOTES
Assessment/Plan:    Parkinson's disease [facility])  63-year-old woman presents in follow-up for her Parkinson's disease  Overall she continues to respond well to low-dose of carbidopa/levodopa  She is most affected at this point by a fairly significant depression with marked anhedonia and fatigue  She is taking her medicines inconsistently  We discussed different options regarding medication management  For her Parkinson's disease we agreed to keep the Sinemet unchanged though we could increase the dose in the future if needed  For the patient's depression will start by having her take the Lexapro 10 mg at bedtime and see how she feels  If she does not have any adverse affects we will increase the dose to 20 mg with the hope of improving her depression  If this is ineffective we could try switching her to Effexor in the morning for its activating properties, though would need to monitor her blood pressure  Also will set the patient up with speech therapy and encouraged her to take on some more activities during the day  Diagnoses and all orders for this visit:    Parkinson's disease [facility])  -     Ambulatory referral to Speech Therapy; Future    Depression with anxiety        Subjective:     Patient ID: Janna Holder is a 80 y o  female  I had the pleasure of seeing your patient, Janna Holder in the Movement Disorders Clinic at the [facility]  Gerhardtsukh Adorno is a rosalinda 80year old right handed woman who presents in follow up for Parkinson's disease, symptom onset with right hand tremor which began at age 80  She does appear to be levodopa responsive  [facility] resident      Current medications:  - Sinemet 25/100; 1 tab; TID definetely helps  7a 11a 6p (additional QHS if needed)   - lexapro 5mg BID     Interval history: here with her daughter   Very tired  Sometimes feels better if she naps  Hard time getting up  Some pain in her left hand   Some radiation down the arm  Lots of depression  Lots of anxiety  Doesn't like to do the things that usually give her pleasure  Lots of fatigue  Has palpitations at night, filled with worry  Tremor seems well controlled except when she gets very worked up  Goes to yoga once per week but tries to go twice  3-4pm feels better overall  Regarding motor symptoms:   Changes in gait: usually ok  Some balance troubles  Uses the cane or walker  Falls: no  Very careful  Trouble with swallowing: just cold things     POA: filed, daughter     Regarding medication complications:  Wearing off: no   Dyskinesias: no     The following portions of the patient's history were reviewed and updated as appropriate: allergies, current medications, past family history, past medical history, past social history, past surgical history and problem list       Objective:  /82 (BP Location: Right arm, Patient Position: Sitting, Cuff Size: Standard)   Ht 5' 6" (1 676 m)   Wt 72 6 kg (160 lb)   LMP  (LMP Unknown)   BMI 25 82 kg/m²     Physical Exam    Neurological Exam     GENERAL MEDICAL EXAMINATION:  General appearance: alert, in no apparent distress  Appropriately dressed and groomed  Conversing and interacting appropriately  Eyes: Sclera are non-injected  Ears, nose, Mouth, Throat: Mucous membranes are moist    Resp: Breathing comfortably on RA   Musculoskeletal: No evidence of deformities  No contractures  No Edema  Skin: No visible rashes  Warm and well perfused  Psych: tearful at times  Mental Status:  Alert and oriented to person place and time  Able to relate history without difficulty  Attentive to conversation  Language is fluent and appropriate with normal prosody  There were no paraphasic errors  Speech was not dysarthric  Able to follow both midline and appendicular commands       General Neurology examination:     UPDRS motor:                              Time since last dose:  2     Speech  1     Facial Expression  1     Rigidity - Neck  0     Rigidity - Upper Extremity (Right)  0     Rigidity - Upper Extremity (Left)   0     Rigidity - Lower Extremity (Right)  2     Rigidity - Lower Extremity (Left)   1     Finger Taps (Right)   2     Finger Taps (Left)   2     Hand Movement (Right)  2     Hand Movement (Left)   2     Pronation/Supination (Right)  1     Pronation/Supination (Left)   1     Toe Tapping (Right) 1     Toe Tapping (Left) 1     Leg Agility (Right)  1     Leg Agility (Left)   1     Arising from Chair   2     Gait   3 cane    Freezing of Gait 0     Postural Stability        Posture 2     Global spontaneity of movement 1     Postural Tremor (Right) 0     Postural Tremor (Left) 0     Kinetic Tremor (Right)  0     Kinetic Tremor (Left)  0     Rest tremor amplitude RUE 2     Rest tremor amplitude LUE 0     Rest tremor amplitude RLE 0     Reset tremor amplitude LLE 0     Lip/Jaw Tremor  2     Consistency of tremor 3     Motor Exam Total:              Review of Systems   Constitutional: Positive for fatigue  Negative for appetite change and fever  HENT: Negative  Negative for hearing loss, tinnitus, trouble swallowing and voice change  Eyes: Negative  Negative for photophobia and pain  Respiratory: Negative  Negative for shortness of breath  Cardiovascular: Negative  Negative for palpitations  Gastrointestinal: Negative  Negative for nausea and vomiting  Endocrine: Negative  Negative for cold intolerance and heat intolerance  Genitourinary: Negative  Negative for dysuria, frequency and urgency  Musculoskeletal: Positive for gait problem  Negative for myalgias and neck pain  Skin: Negative  Negative for rash  Neurological: Negative for dizziness, tremors, seizures, syncope, facial asymmetry, speech difficulty, weakness, light-headedness, numbness and headaches  Hematological: Negative  Does not bruise/bleed easily     Psychiatric/Behavioral: Negative for confusion, hallucinations and sleep disturbance  The patient is nervous/anxious  Depression   The above ROS was reviewed and updated  Ladonna Vang MD  Medical Director   [facility]  Movement and Memory Specialist       Current Outpatient Medications on File Prior to Visit   Medication Sig Dispense Refill    acetaminophen (TYLENOL) 325 mg tablet Take 325 mg by mouth 2 (two) times a day before breakfast and lunch      aspirin 81 mg chewable tablet Chew 81 mg daily      calcium citrate-vitamin D (CITRACAL+D) 315-200 MG-UNIT per tablet Take 1 tablet by mouth daily      carbidopa-levodopa (SINEMET)  mg per tablet Take 1 tablet by mouth 3 (three) times a day 270 tablet 3    escitalopram (LEXAPRO) 10 mg tablet Take 1 tablet (10 mg total) by mouth daily TAKE 1/2 TAB DAILY (Patient taking differently: Take 10 mg by mouth 2 (two) times a day TAKE 1/2 TAB DAILY) 90 tablet 0    LORazepam (ATIVAN) 0 5 mg tablet Take 1 tablet (0 5 mg total) by mouth 2 (two) times a day 60 tablet 0    traMADol (ULTRAM) 50 mg tablet Take 50 mg by mouth as needed for moderate pain      chlorhexidine (PERIDEX) 0 12 % solution Use as directed  0    cholecalciferol (VITAMIN D3) 1,000 units tablet Take 3,000 Units by mouth daily        No current facility-administered medications on file prior to visit  Hx of recurrent abd pain/ UTI and multiple admissions  Hx of ESBL (prev. treated w/ Meropenem)  episode of black mucoid diarrhea  GASTRO (Dr. Darby)  elevated LFT  amylase, lipase - wnl  ABD US - cholelithiasis, R renal cyst  CT Abd/Pelvis (w/ contrast) - stercoral colitis, cholelithiasis  possible surgical eval  constipation - given Senna and Miralax; Golytely x 1 day  C. diff neg (7/20/21)

## 2021-08-10 ENCOUNTER — APPOINTMENT (OUTPATIENT)
Dept: LAB | Facility: CLINIC | Age: 86
End: 2021-08-10
Payer: MEDICARE

## 2021-08-10 DIAGNOSIS — E55.9 VITAMIN D DEFICIENCY: ICD-10-CM

## 2021-08-10 DIAGNOSIS — G20 PARKINSON'S DISEASE (HCC): Chronic | ICD-10-CM

## 2021-08-10 LAB
25(OH)D3 SERPL-MCNC: 40.3 NG/ML (ref 30–100)
ALBUMIN SERPL BCP-MCNC: 3.3 G/DL (ref 3.5–5)
ALP SERPL-CCNC: 65 U/L (ref 46–116)
ALT SERPL W P-5'-P-CCNC: 9 U/L (ref 12–78)
ANION GAP SERPL CALCULATED.3IONS-SCNC: 8 MMOL/L (ref 4–13)
AST SERPL W P-5'-P-CCNC: 14 U/L (ref 5–45)
BASOPHILS # BLD AUTO: 0.03 THOUSANDS/ΜL (ref 0–0.1)
BASOPHILS NFR BLD AUTO: 1 % (ref 0–1)
BILIRUB SERPL-MCNC: 0.54 MG/DL (ref 0.2–1)
BUN SERPL-MCNC: 17 MG/DL (ref 5–25)
CALCIUM ALBUM COR SERPL-MCNC: 9.3 MG/DL (ref 8.3–10.1)
CALCIUM SERPL-MCNC: 8.7 MG/DL (ref 8.3–10.1)
CHLORIDE SERPL-SCNC: 108 MMOL/L (ref 100–108)
CO2 SERPL-SCNC: 28 MMOL/L (ref 21–32)
CREAT SERPL-MCNC: 0.83 MG/DL (ref 0.6–1.3)
EOSINOPHIL # BLD AUTO: 0.23 THOUSAND/ΜL (ref 0–0.61)
EOSINOPHIL NFR BLD AUTO: 4 % (ref 0–6)
ERYTHROCYTE [DISTWIDTH] IN BLOOD BY AUTOMATED COUNT: 12.5 % (ref 11.6–15.1)
GFR SERPL CREATININE-BSD FRML MDRD: 60 ML/MIN/1.73SQ M
GLUCOSE P FAST SERPL-MCNC: 88 MG/DL (ref 65–99)
HCT VFR BLD AUTO: 43.3 % (ref 34.8–46.1)
HGB BLD-MCNC: 14.2 G/DL (ref 11.5–15.4)
IMM GRANULOCYTES # BLD AUTO: 0.03 THOUSAND/UL (ref 0–0.2)
IMM GRANULOCYTES NFR BLD AUTO: 1 % (ref 0–2)
LYMPHOCYTES # BLD AUTO: 1.78 THOUSANDS/ΜL (ref 0.6–4.47)
LYMPHOCYTES NFR BLD AUTO: 30 % (ref 14–44)
MCH RBC QN AUTO: 32.4 PG (ref 26.8–34.3)
MCHC RBC AUTO-ENTMCNC: 32.8 G/DL (ref 31.4–37.4)
MCV RBC AUTO: 99 FL (ref 82–98)
MONOCYTES # BLD AUTO: 0.58 THOUSAND/ΜL (ref 0.17–1.22)
MONOCYTES NFR BLD AUTO: 10 % (ref 4–12)
NEUTROPHILS # BLD AUTO: 3.29 THOUSANDS/ΜL (ref 1.85–7.62)
NEUTS SEG NFR BLD AUTO: 54 % (ref 43–75)
NRBC BLD AUTO-RTO: 0 /100 WBCS
PLATELET # BLD AUTO: 257 THOUSANDS/UL (ref 149–390)
PMV BLD AUTO: 9.5 FL (ref 8.9–12.7)
POTASSIUM SERPL-SCNC: 4 MMOL/L (ref 3.5–5.3)
PROT SERPL-MCNC: 6.6 G/DL (ref 6.4–8.2)
RBC # BLD AUTO: 4.38 MILLION/UL (ref 3.81–5.12)
SODIUM SERPL-SCNC: 144 MMOL/L (ref 136–145)
WBC # BLD AUTO: 5.94 THOUSAND/UL (ref 4.31–10.16)

## 2021-08-10 PROCEDURE — 85025 COMPLETE CBC W/AUTO DIFF WBC: CPT

## 2021-08-10 PROCEDURE — 36415 COLL VENOUS BLD VENIPUNCTURE: CPT

## 2021-08-10 PROCEDURE — 82306 VITAMIN D 25 HYDROXY: CPT

## 2021-08-10 PROCEDURE — 80053 COMPREHEN METABOLIC PANEL: CPT

## 2021-08-18 ENCOUNTER — OFFICE VISIT (OUTPATIENT)
Dept: NEUROLOGY | Facility: CLINIC | Age: 86
End: 2021-08-18
Payer: MEDICARE

## 2021-08-18 VITALS
TEMPERATURE: 97.4 F | SYSTOLIC BLOOD PRESSURE: 141 MMHG | DIASTOLIC BLOOD PRESSURE: 76 MMHG | HEART RATE: 75 BPM | WEIGHT: 157.6 LBS | BODY MASS INDEX: 25.44 KG/M2

## 2021-08-18 DIAGNOSIS — G20 PARKINSON'S DISEASE (HCC): Chronic | ICD-10-CM

## 2021-08-18 PROCEDURE — 99213 OFFICE O/P EST LOW 20 MIN: CPT | Performed by: PHYSICIAN ASSISTANT

## 2021-08-18 NOTE — ASSESSMENT & PLAN NOTE
Patient with Parkinson's disease  She continues to do well with low-dose Sinemet  She does have some breakthrough tremors noted more on the right in the office today however she states on a regular basis they are not bothersome to her  The rest of her exam is overall stable with only mild parkinsonian symptoms noted  Her main complaint today is fatigue which she feels more in the morning and seems to ease up later on in the day  There is no clear correlation between this fatigue and when she takes her medications  at this time she is overall happy with her current symptom control  We did discuss the option of increasing her Sinemet dose slightly for better tremor control however given they are not bothersome she would prefer to hold off  She was encouraged to remain active and she was also encouraged to get more stimulated especially in the morning when she is feeling the fatigue

## 2021-08-18 NOTE — PROGRESS NOTES
Patient ID: Ashlie Colon is a 80 y o  female  Assessment/Plan:    Parkinson's disease [facility]  Patient with Parkinson's disease  She continues to do well with low-dose Sinemet  She does have some breakthrough tremors noted more on the right in the office today however she states on a regular basis they are not bothersome to her  The rest of her exam is overall stable with only mild parkinsonian symptoms noted  Her main complaint today is fatigue which she feels more in the morning and seems to ease up later on in the day  There is no clear correlation between this fatigue and when she takes her medications  at this time she is overall happy with her current symptom control  We did discuss the option of increasing her Sinemet dose slightly for better tremor control however given they are not bothersome she would prefer to hold off  She was encouraged to remain active and she was also encouraged to get more stimulated especially in the morning when she is feeling the fatigue  Subjective:    Dariela Jerry is a rosalinda 80year old right handed woman who presents in follow up for Parkinson's disease  To review,  symptom onset with right hand tremor which began at age 80  She does appear to be levodopa responsive  [facility] resident (pre-covid)  At her last visit she was doing well on low dose Sinemet other than some mild increase of tremors  She was to try and add a ½ tab of Sinemet as needed  INTERVAL HISTORY:  She is overall doing well  She presents with her daughter   She feels tired all of the time  Her walking is not as fast as in the past   She uses a walker at [facility]   She goes to [facility] for all of her meals   She goes for walks 2-3 times a day for exercise   No falls  Her daughter helps to make sure she gets in and out of the shower    She does have a shower chair   She is able to dress on her own  She will occasionally have some trouble with swallowing however nothing recent   She is sleeping well at night   She will notice some tremors at times  She may also notice some teeth chatter at times as well  No hallucinations  She had been having vivid dreams however this is now better   She plays cards in the evening     Current medications:  - Sinemet 25/100; 1 tab; TID  7a 1p 7p (additional ½ tab if needed)   - lexapro 10mg BID       I personally reviewed and updated the ROS  Objective:    Blood pressure 141/76, pulse 75, temperature (!) 97 4 °F (36 3 °C), weight 71 5 kg (157 lb 9 6 oz), not currently breastfeeding  Physical Exam  Constitutional:       General: She is awake  Appearance: Normal appearance  Eyes:      General: Lids are normal       Extraocular Movements: Extraocular movements intact  Pupils: Pupils are equal, round, and reactive to light  Pulmonary:      Effort: Pulmonary effort is normal    Neurological:      Mental Status: She is alert  Deep Tendon Reflexes: Strength normal          Neurological Exam  Mental Status  Awake and alert  Oriented to person, place and time  Cranial Nerves  CN III, IV, VI: Extraocular movements intact bilaterally  Normal lids and orbits bilaterally  Pupils equal round and reactive to light bilaterally  CN V:  Right: Facial sensation is normal   Left: Facial sensation is normal on the left  CN VIII:  Right: Hearing is decreased  Left: Hearing is decreased  CN XI: Shoulder shrug strength is normal     Motor   Strength is 5/5 throughout all four extremities  Sensory  Light touch is normal in upper and lower extremities  Coordination  Right: Finger-to-nose abnormality:  Left: Finger-to-nose abnormality:    Gait    Slow to rise from the chair and able to do so independently  Ambulating cane  Slight imbalance more noted with turns  Eva Smith         UPDRS motor:                              Time since last dose:     8/18/21   Speech  0  0   Facial Expression  0     Rigidity - Neck  0  0   Rigidity - Upper Extremity (Right)  0  0   Rigidity - Upper Extremity (Left)   0  0   Rigidity - Lower Extremity (Right)  0  0   Rigidity - Lower Extremity (Left)   0  0   Finger Taps (Right)   2  2   Finger Taps (Left)   2  1   Hand Movement (Right)  1  2   Hand Movement (Left)   1  1   Pronation/Supination (Right)  2  2   Pronation/Supination (Left)   1  1   Toe Tapping (Right) 1  1   Toe Tapping (Left) 0  1   Leg Agility (Right)  1  0   Leg Agility (Left)   0  0   Arising from Chair   2  1   Gait   2  2   Freezing of Gait 0  0   Postural Stability         Posture 2  2   Global spontaneity of movement 1  1   Postural Tremor (Right) 0  0   Postural Tremor (Left) 0  0   Kinetic Tremor (Right)  0  0   Kinetic Tremor (Left)  0  0   Rest tremor amplitude RUE 3  3   Rest tremor amplitude LUE 0  0   Rest tremor amplitude RLE 0  0   Reset tremor amplitude LLE 0  0   Lip/Jaw Tremor  2     Consistency of tremor 3  2   Motor Exam Total:              ROS:    Review of Systems   Constitutional: Positive for fatigue  Negative for appetite change and fever  HENT: Positive for voice change  Negative for hearing loss, tinnitus and trouble swallowing  Eyes: Negative  Negative for photophobia and pain  Respiratory: Negative  Negative for shortness of breath  Cardiovascular: Negative  Negative for palpitations  Gastrointestinal: Negative  Negative for nausea and vomiting  Endocrine: Negative  Negative for cold intolerance  Genitourinary: Negative  Negative for dysuria, frequency and urgency  Musculoskeletal: Negative  Negative for myalgias and neck pain  Skin: Negative  Negative for rash  Neurological: Positive for tremors  Negative for dizziness, seizures, syncope, facial asymmetry, speech difficulty, weakness, light-headedness, numbness and headaches  Hematological: Negative  Does not bruise/bleed easily  Psychiatric/Behavioral: Negative  Negative for confusion, hallucinations and sleep disturbance     All other systems reviewed and are negative

## 2021-09-07 ENCOUNTER — TELEPHONE (OUTPATIENT)
Dept: INTERNAL MEDICINE CLINIC | Facility: CLINIC | Age: 86
End: 2021-09-07

## 2021-09-07 NOTE — TELEPHONE ENCOUNTER
Pamela Jones needs to reschedule her appt she has tomorrow  The person who was going to taker her has been exposed to a (+) person  Ana María pal cant take her tomorrow  As soon as you can fit her in  Thank you  She called back and [facility] will bring her now  Thank you      [phone]

## 2021-09-08 ENCOUNTER — OFFICE VISIT (OUTPATIENT)
Dept: INTERNAL MEDICINE CLINIC | Facility: CLINIC | Age: 86
End: 2021-09-08
Payer: MEDICARE

## 2021-09-08 VITALS
HEIGHT: 66 IN | RESPIRATION RATE: 12 BRPM | DIASTOLIC BLOOD PRESSURE: 72 MMHG | HEART RATE: 78 BPM | BODY MASS INDEX: 25.39 KG/M2 | SYSTOLIC BLOOD PRESSURE: 114 MMHG | WEIGHT: 158 LBS

## 2021-09-08 DIAGNOSIS — M54.16 LUMBAR RADICULOPATHY: Chronic | ICD-10-CM

## 2021-09-08 DIAGNOSIS — F41.8 DEPRESSION WITH ANXIETY: Chronic | ICD-10-CM

## 2021-09-08 DIAGNOSIS — M81.0 AGE-RELATED OSTEOPOROSIS WITHOUT CURRENT PATHOLOGICAL FRACTURE: Chronic | ICD-10-CM

## 2021-09-08 DIAGNOSIS — G20 PARKINSON'S DISEASE (HCC): Primary | Chronic | ICD-10-CM

## 2021-09-08 DIAGNOSIS — C50.919 MALIGNANT NEOPLASM OF FEMALE BREAST, UNSPECIFIED ESTROGEN RECEPTOR STATUS, UNSPECIFIED LATERALITY, UNSPECIFIED SITE OF BREAST (HCC): Chronic | ICD-10-CM

## 2021-09-08 PROCEDURE — 1123F ACP DISCUSS/DSCN MKR DOCD: CPT | Performed by: INTERNAL MEDICINE

## 2021-09-08 PROCEDURE — G0438 PPPS, INITIAL VISIT: HCPCS | Performed by: INTERNAL MEDICINE

## 2021-09-08 PROCEDURE — 99213 OFFICE O/P EST LOW 20 MIN: CPT | Performed by: INTERNAL MEDICINE

## 2021-09-08 NOTE — PATIENT INSTRUCTIONS
Medicare Preventive Visit Patient Instructions  Thank you for completing your Welcome to Medicare Visit or Medicare Annual Wellness Visit today  Your next wellness visit will be due in one year (9/9/2022)  The screening/preventive services that you may require over the next 5-10 years are detailed below  Some tests may not apply to you based off risk factors and/or age  Screening tests ordered at today's visit but not completed yet may show as past due  Also, please note that scanned in results may not display below  Preventive Screenings:  Service Recommendations Previous Testing/Comments   Colorectal Cancer Screening  * Colonoscopy    * Fecal Occult Blood Test (FOBT)/Fecal Immunochemical Test (FIT)  * Fecal DNA/Cologuard Test  * Flexible Sigmoidoscopy Age: 54-65 years old   Colonoscopy: every 10 years (may be performed more frequently if at higher risk)  OR  FOBT/FIT: every 1 year  OR  Cologuard: every 3 years  OR  Sigmoidoscopy: every 5 years  Screening may be recommended earlier than age 48 if at higher risk for colorectal cancer  Also, an individualized decision between you and your healthcare provider will decide whether screening between the ages of 74-80 would be appropriate  Colonoscopy: Not on file  FOBT/FIT: Not on file  Cologuard: Not on file  Sigmoidoscopy: Not on file    Screening Not Indicated     Breast Cancer Screening Age: 36 years old  Frequency: every 1-2 years  Not required if history of left and right mastectomy Mammogram: 01/29/2020    History Breast Cancer   Cervical Cancer Screening Between the ages of 21-29, pap smear recommended once every 3 years  Between the ages of 33-67, can perform pap smear with HPV co-testing every 5 years     Recommendations may differ for women with a history of total hysterectomy, cervical cancer, or abnormal pap smears in past  Pap Smear: Not on file    Screening Not Indicated   Hepatitis C Screening Once for adults born between [facility] frequently in patients at high risk for Hepatitis C Hep C Antibody: Not on file    Screening Not Indicated   Diabetes Screening 1-2 times per year if you're at risk for diabetes or have pre-diabetes Fasting glucose: 88 mg/dL   A1C: No results in last 5 years    Screening Current   Cholesterol Screening Once every 5 years if you don't have a lipid disorder  May order more often based on risk factors  Lipid panel: Not on file    Screening Not Indicated  History Lipid Disorder     Other Preventive Screenings Covered by Medicare:  1  Abdominal Aortic Aneurysm (AAA) Screening: covered once if your at risk  You're considered to be at risk if you have a family history of AAA  2  Lung Cancer Screening: covers low dose CT scan once per year if you meet all of the following conditions: (1) Age 50-69; (2) No signs or symptoms of lung cancer; (3) Current smoker or have quit smoking within the last 15 years; (4) You have a tobacco smoking history of at least 30 pack years (packs per day multiplied by number of years you smoked); (5) You get a written order from a healthcare provider  3  Glaucoma Screening: covered annually if you're considered high risk: (1) You have diabetes OR (2) Family history of glaucoma OR (3)  aged 48 and older OR (3)  American aged 72 and older  3  Osteoporosis Screening: covered every 2 years if you meet one of the following conditions: (1) You're estrogen deficient and at risk for osteoporosis based off medical history and other findings; (2) Have a vertebral abnormality; (3) On glucocorticoid therapy for more than 3 months; (4) Have primary hyperparathyroidism; (5) On osteoporosis medications and need to assess response to drug therapy  · Last bone density test (DXA Scan): 11/09/2020   5  HIV Screening: covered annually if you're between the age of 15-65  Also covered annually if you are younger than 13 and older than 72 with risk factors for HIV infection   For pregnant patients, it is covered up to 3 times per pregnancy  Immunizations:  Immunization Recommendations   Influenza Vaccine Annual influenza vaccination during flu season is recommended for all persons aged >= 6 months who do not have contraindications   Pneumococcal Vaccine (Prevnar and Pneumovax)  * Prevnar = PCV13  * Pneumovax = PPSV23   Adults 25-60 years old: 1-3 doses may be recommended based on certain risk factors  Adults 72 years old: Prevnar (PCV13) vaccine recommended followed by Pneumovax (PPSV23) vaccine  If already received PPSV23 since turning 65, then PCV13 recommended at least one year after PPSV23 dose  Hepatitis B Vaccine 3 dose series if at intermediate or high risk (ex: diabetes, end stage renal disease, liver disease)   Tetanus (Td) Vaccine - COST NOT COVERED BY MEDICARE PART B Following completion of primary series, a booster dose should be given every 10 years to maintain immunity against tetanus  Td may also be given as tetanus wound prophylaxis  Tdap Vaccine - COST NOT COVERED BY MEDICARE PART B Recommended at least once for all adults  For pregnant patients, recommended with each pregnancy  Shingles Vaccine (Shingrix) - COST NOT COVERED BY MEDICARE PART B  2 shot series recommended in those aged 48 and above     Health Maintenance Due:  There are no preventive care reminders to display for this patient  Immunizations Due:      Topic Date Due    Influenza Vaccine (1) 09/01/2021     Advance Directives   What are advance directives? Advance directives are legal documents that state your wishes and plans for medical care  These plans are made ahead of time in case you lose your ability to make decisions for yourself  Advance directives can apply to any medical decision, such as the treatments you want, and if you want to donate organs  What are the types of advance directives? There are many types of advance directives, and each state has rules about how to use them   You may choose a combination of any of the following:  · Living will: This is a written record of the treatment you want  You can also choose which treatments you do not want, which to limit, and which to stop at a certain time  This includes surgery, medicine, IV fluid, and tube feedings  · Durable power of  for healthcare [facility]): This is a written record that states who you want to make healthcare choices for you when you are unable to make them for yourself  This person, called a proxy, is usually a family member or a friend  You may choose more than 1 proxy  · Do not resuscitate (DNR) order:  A DNR order is used in case your heart stops beating or you stop breathing  It is a request not to have certain forms of treatment, such as CPR  A DNR order may be included in other types of advance directives  · Medical directive: This covers the care that you want if you are in a coma, near death, or unable to make decisions for yourself  You can list the treatments you want for each condition  Treatment may include pain medicine, surgery, blood transfusions, dialysis, IV or tube feedings, and a ventilator (breathing machine)  · Values history: This document has questions about your views, beliefs, and how you feel and think about life  This information can help others choose the care that you would choose  Why are advance directives important? An advance directive helps you control your care  Although spoken wishes may be used, it is better to have your wishes written down  Spoken wishes can be misunderstood, or not followed  Treatments may be given even if you do not want them  An advance directive may make it easier for your family to make difficult choices about your care  Urinary Incontinence   Urinary incontinence (UI)  is when you lose control of your bladder  UI develops because your bladder cannot store or empty urine properly   The 3 most common types of UI are stress incontinence, urge incontinence, or both   Medicines:   · May be given to help strengthen your bladder control  Report any side effects of medication to your healthcare provider  Do pelvic muscle exercises often:  Your pelvic muscles help you stop urinating  Squeeze these muscles tight for 5 seconds, then relax for 5 seconds  Gradually work up to squeezing for 10 seconds  Do 3 sets of 15 repetitions a day, or as directed  This will help strengthen your pelvic muscles and improve bladder control  Train your bladder:  Go to the bathroom at set times, such as every 2 hours, even if you do not feel the urge to go  You can also try to hold your urine when you feel the urge to go  For example, hold your urine for 5 minutes when you feel the urge to go  As that becomes easier, hold your urine for 10 minutes  Self-care:   · Keep a UI record  Write down how often you leak urine and how much you leak  Make a note of what you were doing when you leaked urine  · Drink liquids as directed  You may need to limit the amount of liquid you drink to help control your urine leakage  Do not drink any liquid right before you go to bed  Limit or do not have drinks that contain caffeine or alcohol  · Prevent constipation  Eat a variety of high-fiber foods  Good examples are high-fiber cereals, beans, vegetables, and whole-grain breads  Walking is the best way to trigger your intestines to have a bowel movement  · Exercise regularly and maintain a healthy weight  Weight loss and exercise will decrease pressure on your bladder and help you control your leakage  · Use a catheter as directed  to help empty your bladder  A catheter is a tiny, plastic tube that is put into your bladder to drain your urine  · Go to behavior therapy as directed  Behavior therapy may be used to help you learn to control your urge to urinate      Weight Management   Why it is important to manage your weight:  Being overweight increases your risk of health conditions such as heart disease, high blood pressure, type 2 diabetes, and certain types of cancer  It can also increase your risk for osteoarthritis, sleep apnea, and other respiratory problems  Aim for a slow, steady weight loss  Even a small amount of weight loss can lower your risk of health problems  How to lose weight safely:  A safe and healthy way to lose weight is to eat fewer calories and get regular exercise  You can lose up about 1 pound a week by decreasing the number of calories you eat by 500 calories each day  Healthy meal plan for weight management:  A healthy meal plan includes a variety of foods, contains fewer calories, and helps you stay healthy  A healthy meal plan includes the following:  · Eat whole-grain foods more often  A healthy meal plan should contain fiber  Fiber is the part of grains, fruits, and vegetables that is not broken down by your body  Whole-grain foods are healthy and provide extra fiber in your diet  Some examples of whole-grain foods are whole-wheat breads and pastas, oatmeal, brown rice, and bulgur  · Eat a variety of vegetables every day  Include dark, leafy greens such as spinach, kale, sekou greens, and mustard greens  Eat yellow and orange vegetables such as carrots, sweet potatoes, and winter squash  · Eat a variety of fruits every day  Choose fresh or canned fruit (canned in its own juice or light syrup) instead of juice  Fruit juice has very little or no fiber  · Eat low-fat dairy foods  Drink fat-free (skim) milk or 1% milk  Eat fat-free yogurt and low-fat cottage cheese  Try low-fat cheeses such as mozzarella and other reduced-fat cheeses  · Choose meat and other protein foods that are low in fat  Choose beans or other legumes such as split peas or lentils  Choose fish, skinless poultry (chicken or turkey), or lean cuts of red meat (beef or pork)  Before you cook meat or poultry, cut off any visible fat  · Use less fat and oil  Try baking foods instead of frying them  Add less fat, such as margarine, sour cream, regular salad dressing and mayonnaise to foods  Eat fewer high-fat foods  Some examples of high-fat foods include french fries, doughnuts, ice cream, and cakes  · Eat fewer sweets  Limit foods and drinks that are high in sugar  This includes candy, cookies, regular soda, and sweetened drinks  Exercise:  Exercise at least 30 minutes per day on most days of the week  Some examples of exercise include walking, biking, dancing, and swimming  You can also fit in more physical activity by taking the stairs instead of the elevator or parking farther away from stores  Ask your healthcare provider about the best exercise plan for you  © Copyright [employer] 2018 Information is for End User's use only and may not be sold, redistributed or otherwise used for commercial purposes   All illustrations and images included in CareNotes® are the copyrighted property of [employer]  or [address] [place] [address]

## 2021-09-08 NOTE — PROGRESS NOTES
Assessment and Plan:     Problem List Items Addressed This Visit     None           Preventive health issues were discussed with patient, and age appropriate screening tests were ordered as noted in patient's After Visit Summary  Personalized health advice and appropriate referrals for health education or preventive services given if needed, as noted in patient's After Visit Summary  History of Present Illness:     Patient presents for Medicare Annual Wellness visit    Patient Care Team:  Niels Espinosa MD as PCP - General     Problem List:     Patient Active Problem List   Diagnosis    Depression with anxiety    Lumbar radiculopathy    Parkinson's disease ([facility] [address] [facility]    Tremor    Hypertension    Hyperlipidemia    Abdominal pain    Anemia    Arthritis    Atherosclerosis    [facility]    Dyspnea    Esophageal reflux    Fatigue    Gastroparesis    Hoarseness    Osteoporosis    Nontoxic single thyroid nodule    Vitamin D deficiency    Difficulty urinating    REM behavioral disorder      Past Medical and Surgical History:     Past Medical History:   Diagnosis Date    Anxiety     Arthritis     Gastroparesis     History of atherosclerosis     History of breast cancer     History of osteoarthritis     Long term use of drug     [facility] ) 04/11/2011    left w/mastectomy; age 80    Osteomalacia      Past Surgical History:   Procedure Laterality Date    BREAST BIOPSY Left 04/11/2011    u/s core-positive    HYSTERECTOMY  1974    age 52    MASTECTOMY Left 05/04/2011    malignant      Family History:     Family History   Problem Relation Age of Onset    Coronary artery disease Mother     Coronary artery disease Family     Hyperlipidemia Family     Osteoarthritis Family     Lung cancer Sister 48      Social History:     Social History     Socioeconomic History    Marital status:       Spouse name: Not on file    Number of children: Not on file    Years of education: Not on file    Highest education level: Not on file   Occupational History    Not on file   Tobacco Use    Smoking status: Never Smoker    Smokeless tobacco: Never Used   Vaping Use    Vaping Use: Never used   Substance and Sexual Activity    Alcohol use: Yes     Comment: Social drinker    Drug use: No    Sexual activity: Not Currently   Other Topics Concern    Not on file   Social History Narrative    Not on file     Social Determinants of Health     Financial Resource Strain:     Difficulty of Paying Living Expenses:    Food Insecurity:     Worried About Running Out of Food in the Last Year:     [address] in the Last Year:    Transportation Needs:     Lack of Transportation (Medical):      Lack of Transportation (Non-Medical):    Physical Activity:     Days of Exercise per Week:     Minutes of Exercise per Session:    Stress:     Feeling of Stress :    Social Connections:     Frequency of Communication with Friends and Family:     Frequency of Social Gatherings with Friends and Family:     Attends Oriental orthodox Services:     Active Member of Clubs or Organizations:     Attends Club or Organization Meetings:     Marital Status:    Intimate Partner Violence:     Fear of Current or Ex-Partner:     Emotionally Abused:     Physically Abused:     Sexually Abused:       Medications and Allergies:     Current Outpatient Medications   Medication Sig Dispense Refill    acetaminophen (TYLENOL) 325 mg tablet Take 325 mg by mouth 2 (two) times a day before breakfast and lunch      calcium citrate-vitamin D (CITRACAL+D) 315-200 MG-UNIT per tablet Take 1 tablet by mouth daily      carbidopa-levodopa (SINEMET)  mg per tablet Take 1 tablet by mouth 3 (three) times a day 270 tablet 3    chlorhexidine (PERIDEX) 0 12 % solution Use as directed (Patient not taking: Reported on 8/18/2021)  0    cholecalciferol (VITAMIN D3) 1,000 units tablet Take 3,000 Units by mouth daily  escitalopram (LEXAPRO) 10 mg tablet TAKE 1/2 TABLET BY MOUTH TWO TIMES DAILY 90 tablet 3    LORazepam (ATIVAN) 0 5 mg tablet TAKE 1 TABLET BY MOUTH TWO TIMES DAILY (Patient not taking: Reported on 6/25/2020) 60 tablet 0    traMADol (ULTRAM) 50 mg tablet Take 1/2 tab twice daily prn- Severe pain 30 tablet 0     No current facility-administered medications for this visit  No Known Allergies   Immunizations:     Immunization History   Administered Date(s) Administered    INFLUENZA 10/21/2014, 09/30/2015, 09/20/2016, 11/14/2017    Influenza Quadrivalent Preservative Free 3 years and older IM 10/21/2014    Influenza Split High Dose Preservative Free IM 09/30/2015, 09/20/2016, 11/14/2017, 10/11/2019    Influenza, high dose seasonal 0 7 mL 10/20/2018, 09/11/2020    Pneumococcal Conjugate 13-Valent 12/08/2015    Pneumococcal Polysaccharide PPV23 11/22/2019    SARS-CoV-2 / COVID-19 mRNA IM (Pfizer-BioNTech) 01/22/2021, 02/12/2021    Zoster 03/04/2013, 05/17/2018    Zoster Vaccine Recombinant 05/17/2018, 07/24/2018      Health Maintenance: There are no preventive care reminders to display for this patient  Topic Date Due    Influenza Vaccine (1) 09/01/2021      Medicare Health Risk Assessment:     Ht 5' 6" (1 676 m)   Wt 71 7 kg (158 lb)   LMP  (LMP Unknown)   BMI 25 50 kg/m²      Emilia Garcia is here for her Subsequent Wellness visit  Last Medicare Wellness visit information reviewed, patient interviewed and updates made to the record today  Health Risk Assessment:   Patient rates overall health as good  Patient feels that their physical health rating is slightly worse  Patient is satisfied with their life  Eyesight was rated as same  Hearing was rated as slightly worse  Patient feels that their emotional and mental health rating is slightly worse  Patients states they are never, rarely angry  Patient states they are often unusually tired/fatigued   Pain experienced in the last 7 days has been some  Patient's pain rating has been 5/10  Patient states that she has experienced no weight loss or gain in last 6 months  Depression Screening:   PHQ-2 Score: 2  PHQ-9 Score: 4      Fall Risk Screening: In the past year, patient has experienced: no history of falling in past year      Urinary Incontinence Screening:   Patient has leaked urine accidently in the last six months  Home Safety:  Patient does not have trouble with stairs inside or outside of their home  Patient has working smoke alarms and has working carbon monoxide detector  Home safety hazards include: none  Nutrition:   Current diet is Regular and No Added Salt  Medications:   Patient is currently taking over-the-counter supplements  OTC medications include: see medication list  Patient is able to manage medications  Activities of Daily Living (ADLs)/Instrumental Activities of Daily Living (IADLs):   Walk and transfer into and out of bed and chair?: Yes  Dress and groom yourself?: Yes    Bathe or shower yourself?: Yes    Feed yourself? Yes  Do your laundry/housekeeping?: Yes  Manage your money, pay your bills and track your expenses?: Yes  Make your own meals?: Yes    Do your own shopping?: Yes    Previous Hospitalizations:   Any hospitalizations or ED visits within the last 12 months?: No      Advance Care Planning:   Living will: Yes    Durable POA for healthcare:  Yes    Advanced directive: Yes      Cognitive Screening:   Provider or family/friend/caregiver concerned regarding cognition?: No    PREVENTIVE SCREENINGS      Cardiovascular Screening:    General: Screening Not Indicated and History Lipid Disorder      Diabetes Screening:     General: Screening Current      Colorectal Cancer Screening:     General: Screening Not Indicated      Breast Cancer Screening:     General: History Breast Cancer      Cervical Cancer Screening:    General: Screening Not Indicated      Osteoporosis Screening:    General: History Osteoporosis and Screening Current      Abdominal Aortic Aneurysm (AAA) Screening:        General: Risks and Benefits Discussed and Screening Current      Lung Cancer Screening:     General: Screening Not Indicated      Hepatitis C Screening:    General: Screening Not Indicated    Screening, Brief Intervention, and Referral to Treatment (SBIRT)    Screening  Typical number of drinks in a day: 0  Typical number of drinks in a week: 0  Interpretation: Low risk drinking behavior      AUDIT-C Screenin) How often did you have a drink containing alcohol in the past year? never  2) How many drinks did you have on a typical day when you were drinking in the past year? 0  3) How often did you have 6 or more drinks on one occasion in the past year? never    AUDIT-C Score: 0  Interpretation: Score 0-2 (female): Negative screen for alcohol misuse      Michelle Chao MD

## 2021-09-08 NOTE — PROGRESS NOTES
Assessment/Plan:    1  Health maintenance -she will receive influenza vaccine in early October   2  Health maintenance -potentially due for COVID booster and October   3  Osteoporosis -she had received 2 doses of IV bisphosphonate with last dose in 2013  DEXA scan in November 2020 shows hip of -2 5 and forearm -3 9  Now agreeable to therapy with Prolia given her 1st dose today  She will need this every 6 months  Vitamin-D level prior to this visit therapeutic  4  Anxiety depression-exacerbated by medical issues  Will continue current dose of Lexapro  5  Parkinson's disease -responsive to current dose of carbidopa levodopa  Neurology feel she is stable  She told me today she really wants to go back to Neurology if absolutely necessary-I encouraged this but she was bothered by not see an MD at her last appointment-reviewed this and will go over this again at next visit  6  Breast carcinoma-as per prior discussion  She defers a follow-up mammogram assistant and defers on going back to [address]  Fatigue -much of this related to her age plus her Parkinson's  TSH normal -stable  7  Lumbar stenosis -MRI shows diffuse disease with moderate to severe stenosis in diffuse facet disease  Minimally responsive to oral steroids in the past   Had received tramadol for severe pain    Had epidurals we had Neurosurgery and a overall much improved  8   Low vitamin-D level-continue supplemen- at last visit she was increased to 3000 units daily vitamin-D level prior to this visit therapeutic   9   GI symptoms   Has a history of intermittent abdominal pain   CT scan benign   All labs normal   Endoscopy normal other than gastritis-stoppage itis   MRCP showed no common duct stone   Gastric emptying study was  abnormal  Not using Reglan with history of depression   She has increasing symptoms may be candidate for done  Destiny Lob been on omeprazole but remains off that  10   Rhinitis-allergic versus perennial-uses Flonase nasal spray the  11   Breast carcinoma-as per prior discussion   She see surgery in reference to follow-up   Mammogram in December 2017 benign    mammogram in  January 2020 benign  12   Left lobe thyroid nodule   had prior aspiration   Follow-up ultrasound shows multiple nodules in surgery feels no other intervention is needed   See surgery yearly   TSH prior to this visit normal  13  Macrocytosis-longstanding -TSH normal    B12 normal -stable  14 -history shortness of breath -occurred in the past when upset- nuclear stress test normal   May be related 20 diarrhea  Has not recurred  15  Gastroparesis -had been on Reglan in the past but this is contraindicated with her Parkinson's I- had used use omeprazole as needed in the past- asymptomatic times months  16 ERYTHEMA THIS AREAS OF THE HARD PALATE-LIKELY BENIGN-MAY BE RELATED TO ENSURE- WILL RE-EVALUATE NEXT VISIT-PATIENT WILL CALL IF SHE HAS SYMPTOMS      MEDICAL REGIMEN:      Carbidopa low Leva dopa -25/100 taken t i d , Lexapro 10 milligrams a day using generic, Citracal D daily, vitamin D3 / 2000 units a day, baby aspirin twice per week, rare use of tramadol 50 milligrams half tablet as needed, Prolia injection with 1st dose today    Appointment in 3 months without labs  Will then  Need to be seen 3 months later for dose of Prolia              No problem-specific Assessment & Plan notes found for this encounter  Diagnoses and all orders for this visit:    Parkinson's disease ([facility] [address] )    Age-related osteoporosis without current pathological fracture    Depression with anxiety    Lumbar radiculopathy    Malignant neoplasm of female breast, unspecified estrogen receptor status, unspecified laterality, unspecified site of breast ([address] )          Subjective:      Patient ID: Nicole Gill is a 80 y o  female  She was here for Medicare wellness wanted to readdress the issue of her osteoporosis    DEXA scan done November 2020 shows hip of -2 5 and forearm of -3 9 -lumbar spine not interpret a with her significant DJD  She was treated with IV bisphosphonates with last dose in 2013  She is at high risk for fracture with fall and more likely to fall with her Parkinson's  She is agreeable now to treatment  She was given her 1st dose of Prolia today  She was upset when she went to Neurology  They made a decision not to change meds but she was bothered by the appointment  She wants to see physician at her next appointment  She also had a follow-up appointment with surgery  She has declined mammograms of breast cancer  This patient denies any systemic symptoms  Specifically there has been no evidence of fever, night sweats, significant weight loss or significant decrease in appetite       Results for orders placed or performed in visit on 08/10/21  -CBC and differential       Result                      Value             Ref Range           WBC                         5 94              4 31 - 10 16*       RBC                         4 38              3 81 - 5 12 *       Hemoglobin                  14 2              11 5 - 15 4 *       Hematocrit                  43 3              34 8 - 46 1 %       MCV                         99 (H)            82 - 98 fL          MCH                         32 4              26 8 - 34 3 *       MCHC                        32 8              31 4 - 37 4 *       RDW                         12 5              11 6 - 15 1 %       MPV                         9 5               8 9 - 12 7 fL       Platelets                   257               149 - 390 Th*       nRBC                        0                 /100 WBCs           Neutrophils Relative        54                43 - 75 %           Immat GRANS %               1                 0 - 2 %             Lymphocytes Relative        30                14 - 44 %           Monocytes Relative          10                4 - 12 %            Eosinophils Relative        4                 0 - 6 % Basophils Relative          1                 0 - 1 %             Neutrophils Absolute        3 29              1 85 - 7 62 *       Immature Grans Absolute     0 03              0 00 - 0 20 *       Lymphocytes Absolute        1 78              0 60 - 4 47 *       Monocytes Absolute          0 58              0 17 - 1 22 *       Eosinophils Absolute        0 23              0 00 - 0 61 *       Basophils Absolute          0 03              0 00 - 0 10 *  -Comprehensive metabolic panel       Result                      Value             Ref Range           Sodium                      144               136 - 145 mm*       Potassium                   4 0               3 5 - 5 3 mm*       Chloride                    108               100 - 108 mm*       CO2                         28                21 - 32 mmol*       ANION GAP                   8                 4 - 13 mmol/L       BUN                         17                5 - 25 mg/dL        Creatinine                  0 83              0 60 - 1 30 *       Glucose, Fasting            88                65 - 99 mg/dL       Calcium                     8 7               8 3 - 10 1 m*       Corrected Calcium           9 3               8 3 - 10 1 m*       AST                         14                5 - 45 U/L          ALT                         9 (L)             12 - 78 U/L         Alkaline Phosphatase        65                46 - 116 U/L        Total Protein               6 6               6 4 - 8 2 g/*       Albumin                     3 3 (L)           3 5 - 5 0 g/*       Total Bilirubin             0 54              0 20 - 1 00 *       eGFR                        60                ml/min/1 73s*  -Vitamin D 25 hydroxy       Result                      Value             Ref Range           Vit D, 25-Hydroxy           40 3              30 0 - 100 0*    As noted vitamin-D level is therapeutic  She is now living back at [facility]    She said she has adapted there and is very content  The following portions of the patient's history were reviewed and updated as appropriate: allergies, current medications, past family history, past medical history, past social history, past surgical history and problem list     Review of Systems   Constitutional: Positive for fatigue  HENT: Negative  Respiratory: Negative  Cardiovascular: Negative  Gastrointestinal: Negative  Endocrine: Negative  Genitourinary: Negative  Musculoskeletal: Negative  Skin: Negative  Neurological: Positive for tremors  Hematological: Negative  Psychiatric/Behavioral: Negative  Objective:      /72   Pulse 78   Resp 12   Ht 5' 6" (1 676 m)   Wt 71 7 kg (158 lb)   LMP  (LMP Unknown)   BMI 25 50 kg/m²          Physical Exam  Vitals reviewed  Constitutional:       General: She is not in acute distress  Appearance: Normal appearance  She is not ill-appearing, toxic-appearing or diaphoretic  HENT:      Head: Normocephalic and atraumatic  Right Ear: Tympanic membrane, ear canal and external ear normal       Left Ear: Ear canal and external ear normal       Nose: Nose normal  No congestion or rhinorrhea  Mouth/Throat:      Mouth: Mucous membranes are moist       Pharynx: Oropharynx is clear  No oropharyngeal exudate or posterior oropharyngeal erythema  Eyes:      General: No scleral icterus  Right eye: No discharge  Left eye: No discharge  Extraocular Movements: Extraocular movements intact  Conjunctiva/sclera: Conjunctivae normal    Neck:      Vascular: No carotid bruit  Cardiovascular:      Rate and Rhythm: Normal rate and regular rhythm  Pulses: Normal pulses  Heart sounds: Normal heart sounds  No murmur heard  No friction rub  No gallop  Pulmonary:      Effort: Pulmonary effort is normal  No respiratory distress  Breath sounds: Normal breath sounds  No stridor   No wheezing, rhonchi or rales  Chest:      Chest wall: No tenderness  Abdominal:      General: Abdomen is flat  Bowel sounds are normal  There is no distension  Palpations: Abdomen is soft  There is no mass  Tenderness: There is no abdominal tenderness  There is no right CVA tenderness, left CVA tenderness, guarding or rebound  Hernia: No hernia is present  Musculoskeletal:         General: No swelling, tenderness, deformity or signs of injury  Normal range of motion  Cervical back: Normal range of motion and neck supple  No rigidity  No muscular tenderness  Right lower leg: No edema  Left lower leg: No edema  Lymphadenopathy:      Cervical: No cervical adenopathy  Skin:     General: Skin is warm and dry  Coloration: Skin is not jaundiced or pale  Findings: No bruising, erythema, lesion or rash  Comments: Benign keratoses   Neurological:      General: No focal deficit present  Mental Status: She is alert and oriented to person, place, and time  Mental status is at baseline  Cranial Nerves: No cranial nerve deficit  Sensory: No sensory deficit  Motor: No weakness  Coordination: Coordination normal       Gait: Gait normal       Deep Tendon Reflexes: Reflexes normal       Comments: Pill rolling tremor right arm greater than left   Psychiatric:         Mood and Affect: Mood normal          Behavior: Behavior normal          Thought Content:  Thought content normal          Judgment: Judgment normal

## 2021-09-17 ENCOUNTER — CLINICAL SUPPORT (OUTPATIENT)
Dept: INTERNAL MEDICINE CLINIC | Facility: CLINIC | Age: 86
End: 2021-09-17
Payer: MEDICARE

## 2021-09-17 DIAGNOSIS — M81.0 AGE-RELATED OSTEOPOROSIS WITHOUT CURRENT PATHOLOGICAL FRACTURE: Primary | ICD-10-CM

## 2021-09-17 PROCEDURE — 96372 THER/PROPH/DIAG INJ SC/IM: CPT

## 2021-10-13 ENCOUNTER — CLINICAL SUPPORT (OUTPATIENT)
Dept: INTERNAL MEDICINE CLINIC | Facility: CLINIC | Age: 86
End: 2021-10-13
Payer: MEDICARE

## 2021-10-13 DIAGNOSIS — Z23 FLU VACCINE NEED: Primary | ICD-10-CM

## 2021-10-13 PROCEDURE — 90662 IIV NO PRSV INCREASED AG IM: CPT

## 2021-10-13 PROCEDURE — G0008 ADMIN INFLUENZA VIRUS VAC: HCPCS

## 2021-11-23 DIAGNOSIS — F41.8 DEPRESSION WITH ANXIETY: ICD-10-CM

## 2021-11-23 RX ORDER — ESCITALOPRAM OXALATE 10 MG/1
TABLET ORAL
Qty: 90 TABLET | Refills: 3 | Status: SHIPPED | OUTPATIENT
Start: 2021-11-23

## 2021-12-13 ENCOUNTER — OFFICE VISIT (OUTPATIENT)
Dept: INTERNAL MEDICINE CLINIC | Facility: CLINIC | Age: 86
End: 2021-12-13
Payer: MEDICARE

## 2021-12-13 VITALS
WEIGHT: 161.4 LBS | RESPIRATION RATE: 12 BRPM | SYSTOLIC BLOOD PRESSURE: 116 MMHG | DIASTOLIC BLOOD PRESSURE: 70 MMHG | HEART RATE: 78 BPM | HEIGHT: 66 IN | BODY MASS INDEX: 25.94 KG/M2 | OXYGEN SATURATION: 93 %

## 2021-12-13 DIAGNOSIS — R07.9 CHEST PAIN, UNSPECIFIED TYPE: Primary | ICD-10-CM

## 2021-12-13 DIAGNOSIS — M81.0 AGE-RELATED OSTEOPOROSIS WITHOUT CURRENT PATHOLOGICAL FRACTURE: Chronic | ICD-10-CM

## 2021-12-13 DIAGNOSIS — F41.8 DEPRESSION WITH ANXIETY: Chronic | ICD-10-CM

## 2021-12-13 DIAGNOSIS — I10 PRIMARY HYPERTENSION: ICD-10-CM

## 2021-12-13 DIAGNOSIS — G20 PARKINSON'S DISEASE (HCC): Chronic | ICD-10-CM

## 2021-12-13 PROBLEM — R07.89 OTHER CHEST PAIN: Status: ACTIVE | Noted: 2021-12-13

## 2021-12-13 PROCEDURE — 99215 OFFICE O/P EST HI 40 MIN: CPT | Performed by: INTERNAL MEDICINE

## 2021-12-13 PROCEDURE — 93000 ELECTROCARDIOGRAM COMPLETE: CPT | Performed by: INTERNAL MEDICINE

## 2021-12-15 ENCOUNTER — HOSPITAL ENCOUNTER (OUTPATIENT)
Dept: NON INVASIVE DIAGNOSTICS | Facility: CLINIC | Age: 86
Discharge: HOME | End: 2021-12-15
Payer: MEDICARE

## 2021-12-15 VITALS
WEIGHT: 161 LBS | HEART RATE: 88 BPM | DIASTOLIC BLOOD PRESSURE: 72 MMHG | SYSTOLIC BLOOD PRESSURE: 114 MMHG | HEIGHT: 66 IN | BODY MASS INDEX: 25.88 KG/M2

## 2021-12-15 DIAGNOSIS — R07.9 CHEST PAIN, UNSPECIFIED TYPE: ICD-10-CM

## 2021-12-15 LAB
AORTIC ROOT: 3 CM
APICAL FOUR CHAMBER EJECTION FRACTION: 58 %
AV REGURGITATION PRESSURE HALF TIME: 0.23 MS
E WAVE DECELERATION TIME: 174 MS
FRACTIONAL SHORTENING: 29 % (ref 28–44)
INTERVENTRICULAR SEPTUM IN DIASTOLE (PARASTERNAL SHORT AXIS VIEW): 1.1 CM
LEFT ATRIUM AREA SYSTOLE SINGLE PLANE A4C: 8.4 CM2
LEFT INTERNAL DIMENSION IN SYSTOLE: 2.5 CM (ref 2.1–4)
LEFT VENTRICULAR INTERNAL DIMENSION IN DIASTOLE: 3.5 CM (ref 4.24–6.32)
LEFT VENTRICULAR POSTERIOR WALL IN END DIASTOLE: 1.1 CM
LEFT VENTRICULAR STROKE VOLUME: 30 ML
MV E'TISSUE VEL-SEP: 8 CM/S
MV PEAK A VEL: 1.34 M/S
MV PEAK E VEL: 84 CM/S
MV STENOSIS PRESSURE HALF TIME: 0 MS
PULMONARY REGURGITATION LATE DIASTOLIC VELOCITY: 0.01 M/S
RIGHT ATRIUM AREA SYSTOLE A4C: 10.3 CM2
RIGHT VENTRICLE ID DIMENSION: 2.6 CM
SL CV AV DECELERATION TIME RETROGRADE: 801 MS
SL CV AV PEAK GRADIENT RETROGRADE: 19 MMHG
SL CV LV EF: 55
SL CV PED ECHO LEFT VENTRICLE DIASTOLIC VOLUME (MOD BIPLANE) 2D: 52 ML
SL CV PED ECHO LEFT VENTRICLE SYSTOLIC VOLUME (MOD BIPLANE) 2D: 22 ML
TRICUSPID VALVE S': 0.8 CM/S
Z-SCORE OF LEFT VENTRICULAR DIMENSION IN END SYSTOLE: -3.91

## 2021-12-15 PROCEDURE — 93306 TTE W/DOPPLER COMPLETE: CPT | Performed by: INTERNAL MEDICINE

## 2021-12-15 PROCEDURE — 93306 TTE W/DOPPLER COMPLETE: CPT

## 2021-12-16 ENCOUNTER — TELEPHONE (OUTPATIENT)
Dept: INTERNAL MEDICINE CLINIC | Facility: CLINIC | Age: 86
End: 2021-12-16

## 2021-12-17 DIAGNOSIS — G20 PARKINSON'S DISEASE (HCC): Chronic | ICD-10-CM

## 2022-01-10 ENCOUNTER — OFFICE VISIT (OUTPATIENT)
Dept: INTERNAL MEDICINE CLINIC | Facility: CLINIC | Age: 87
End: 2022-01-10
Payer: MEDICARE

## 2022-01-10 VITALS
DIASTOLIC BLOOD PRESSURE: 70 MMHG | BODY MASS INDEX: 25.84 KG/M2 | HEIGHT: 66 IN | WEIGHT: 160.8 LBS | SYSTOLIC BLOOD PRESSURE: 120 MMHG | RESPIRATION RATE: 12 BRPM | HEART RATE: 72 BPM

## 2022-01-10 DIAGNOSIS — M81.0 AGE-RELATED OSTEOPOROSIS WITHOUT CURRENT PATHOLOGICAL FRACTURE: Chronic | ICD-10-CM

## 2022-01-10 DIAGNOSIS — G20 PARKINSON'S DISEASE (HCC): Chronic | ICD-10-CM

## 2022-01-10 DIAGNOSIS — R07.89 OTHER CHEST PAIN: Primary | ICD-10-CM

## 2022-01-10 DIAGNOSIS — F41.8 DEPRESSION WITH ANXIETY: Chronic | ICD-10-CM

## 2022-01-10 PROCEDURE — 99215 OFFICE O/P EST HI 40 MIN: CPT | Performed by: INTERNAL MEDICINE

## 2022-01-10 NOTE — PROGRESS NOTES
Assessment/Plan:   1  Health maintenance - patient completed 3 doses of Pfizer COVID vaccine and had influenza vaccine  2  Chest pressure -heaviness-random - on 1 occasion  Happened after walking but denies this at other times  Can last up to an hour without sweats of shortness of breath  Rule out ischemia variant -rule out other   -recent echocardiogram done in December 2021 shows EF normal at 33%, grade 1 diastolic dysfunction, wall thickness mildly increased, mild concentric hypertrophy but no wall motion abnormalities  She has not had any further episodes of chest pain or pressure with activity  At last visit I started her on Cardizem 30 milligrams b i d  And she will continue this  Always watching for associated significant reflux  3  Osteoporosis- had received 2 doses of IV bisphosphonates was last stones in 2013  DEXA scan November 2020 shows hip of -2 5 and formal -3 9  Was agreeable therapy with Prolia and received her 1st dose in September of 2021  Will need her 2nd dose by March of 2022  4  Anxiety depression-exacerbated by medical issues  Because of her fatigue we switched her Lexapro to the evening  5  Parkinson's disease -responsive to current dose of carbidopa levodopa   Neurology feel she is stable   She told me today  Again today that she really does not want to go back to Neurology in defers at this point to go back to Neurology if absolutely necessary-I encouraged this but she was bothered by not see an MD at her last appointment- if she notes decline neurologically she knows she will need to go back to that office  6  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  Nodes were negative margins negative she was ER DE positive and her 2 Zach negative  She was treated with rheumatologist inhibitor-not an issue times years  She defers on follow-up mammogram in going back to surgery  7   Lumbar stenosis -MRI shows diffuse disease with moderate to severe stenosis in diffuse facet disease   Minimally responsive to oral steroids in the past   Had received tramadol for severe pain   Had epidurals we had Neurosurgery and a overall much improved  8   Low vitamin-D level-continue supplemen-  Most recent level therapeutic on current regimen   9   GI symptoms known history of gastroparesis   Has a history of intermittent abdominal pain   CT scan benign   All labs normal   Endoscopy normal other than gastritis-   MRCP showed no common duct stone   Gastric emptying study was  abnormal  Not using Reglan with history of depression- if she has increasing symptoms she may be a candidate for domperidone- had been on omeprazole but remains off that-she has been asymptomatic in this regard times months  10   Rhinitis-allergic versus perennial-uses Flonase nasal spray the  11   Left lobe thyroid nodule   had prior aspiration   Follow-up ultrasound shows multiple nodules in surgery feels no other intervention is needed   See surgery yearly   TSH prior to this visit normal  12  Macrocytosis-longstanding -TSH normal    B12 normal -stable  13 -history shortness of breath -occurred in the past when upset- nuclear stress test normal     Has not recurred  17  Fatigue -likely related  To age plus Parkinson's-rule out contribution of Lexapro-improved to some degree with switching her Lexapro to the evening-monitor        MEDICAL REGIMEN:                                                  Carbidopa/ Leva dopa -25/100 taken t i d , escitalopram 10 mg daily  - taken in the evening  Citracal D daily, vitamin D3 / 2000 units a day, baby aspirin twice per week,  prior rare use of tramadol 50 milligrams half tablet as needed, Prolia injection with 1st dose on September 8, 2021,  Cardizem 30 milligrams b i d  She was returning for follow-up exam over the next couple months  She will need Prolia at that visit    We reviewed that I am retiring-  She will now be seen by Rober Miller as her PCP  No problem-specific Assessment & Plan notes found for this encounter  Diagnoses and all orders for this visit:    Other chest pain    Parkinson's disease ([address] )    Age-related osteoporosis without current pathological fracture    Depression with anxiety          Subjective:      Patient ID: Nico Covarrubias is a 80 y o  female  She returns for follow-up exam   At last visit because of chest pain-questionably representing an angina variant she was placed on low-dose Cardizem  She says overall she is significantly improved  Her daughter states she no longer talks about chest heaviness which can happen with ambulating  She says in the past may also be related to eating  She denies chest pain, dyspepsia at present  She did use the clenched fists signs show the chest pain which may have happened with walking previously  She had an echo done showing an EF normal at 55% with normal systolic function, grade 1 diastolic dysfunction, wall thickness mildly increased with mild concentric hypertrophy and mild AI but no evidence of wall motion abnormalities  Study Details    This transthoracic echocardiogram was performed in the echo lab  This was a routine, outpatient study  Study quality was poor  This was a technically difficult study due to poor acoustic windows  A complete 2D, color flow Doppler and spectral Doppler transthoracic echocardiogram was performed  The apical, parasternal, subcostal and suprasternal views were obtained  Indications  Priority: Routine  Dx: Chest pain, unspecified type (R07 9 (ICD-10-CM))    History    HLD;HTN;Parkinson  Interpretation Summary         Left Ventricle: Left ventricular cavity size is normal  The left ventricular ejection fraction is 55%  Systolic function is normal  Wall motion is normal  Diastolic function is mildly abnormal, consistent with grade I (abnormal) relaxation  Wall thickness is mildly increased  There is mild concentric hypertrophy     Aortic Valve: There is mild regurgitation          Findings    Left Ventricle Left ventricular cavity size is normal  Wall thickness is mildly increased  The left ventricular ejection fraction is 55%  Systolic function is normal   Wall motion is normal  There is mild concentric hypertrophy  Diastolic function is mildly abnormal, consistent with grade I (abnormal) relaxation  Right Ventricle Right ventricular cavity size is normal  Systolic function is normal  Wall thickness is normal   Left Atrium The atrium is normal in size  Right Atrium The atrium is normal in size  Aortic Valve The aortic valve is trileaflet  The leaflets are not thickened  The leaflets are not calcified  The leaflets exhibit normal mobility  There is mild regurgitation  There is no evidence of stenosis  Mitral Valve The mitral valve has normal structure and function  There is no evidence of regurgitation  There is no evidence of stenosis  Tricuspid Valve Tricuspid valve structure is normal  There is trace regurgitation  There is no evidence of stenosis  There is no indirect evidence of pulmonary hypertension  Pulmonic Valve Pulmonic valve structure is normal  There is trace regurgitation  There is no evidence of stenosis  Ascending Aorta The aortic root is normal in size  IVC/SVC The inferior vena cava is normal in size  Pericardium There is no pericardial effusion      Left Ventricle Measurements    Function/Volumes  A4C EF   58 %    Dimensions  LVIDd   3 5 cm    LVIDS   2 5 cm    IVSd   1 1 cm    LVPWd   1 1 cm    FS   29 %    Diastolic Filling  MV E' Tissue Velocity Septal   8 cm/s    E wave deceleration time   174 ms    MV Peak E Aron   84 cm/s    MV Peak A Aron   1 34 m/s         Right Ventricle Measurements    Dimensions  RVID d   2 6 cm    TV S'   0 8 cm/s         Left Atrium Measurements    Dimensions  DEEPTHI A4C   8 4 cm2         Right Atrium Measurements    Dimensions  RAA A4C   10 3 cm2         Aortic Valve Measurements    Regurgitation  AV peak gradient   19 mmHg    AV Deceleration Time   801 ms    AV regurgitation pressure 1/2 time   0 232 ms         Mitral Valve Measurements    Stenosis  MV stenosis pressure 1/2 time   0 ms         Aorta Measurements    Aortic Dimensions  Ao root   3 cm           She remains at her assisted living facility  She eats in the dining [employer]ett with the group of 3-4 other women on a regular basis  She has a history of anxiety depression but overall that appears to be improved  We will continue current dose of SSRI  She has known osteoporosis  She has been started on therapy with Prolia will be due for her next dose at her next visit  She has a history of breast carcinoma with known mastectomy  History of this reviewed in detail  She had mastectomy and sentinel node biopsy  That is for left breast ductal CA  Nodes were negative margins negative  She was ERPR positive and HER2 Zach negative was treated transiently with aromatase inhibitor      This patient denies any systemic symptoms  Specifically there has been no evidence of fever, night sweats, significant weight loss or significant decrease in appetite  We reviewed that I am retiring  I offered her to be seen by 1 of the other physicians in this group and she will continue this  She prefers not to see the physician at her assisted living facility    She has known lumbar stenosis  Prior MRI showed moderate to severe disease  In the past she had epidurals via neurosurgery was overall much improved  She denies neurogenic claudication symptoms at present    She has known significant Parkinson's  She feels the tremors unchanged  She prefers not to go back to neurology at this point unless absolutely necessary and wants to continue her current meds  DXA SCAN     CLINICAL HISTORY:  80-year-old postmenopausal female     OTHER RISK FACTORS:  None      PHARMACOLOGIC THERAPY FOR OSTEOPOROSIS:  None      TECHNIQUE: Bone densitometry was performed using a H[employer] Horizon A  bone densitometer  Regions of interest appear properly placed        COMPARISON: 5/9/2018      RESULTS:      LUMBAR SPINE: Not assessed because  scoliosis and generalized spondylosis result in fewer than two evaluable vertebrae        LEFT  TOTAL HIP:   BMD:  0 664  gm/cm2   T-score:  -2 3     LEFT  FEMORAL NECK:   BMD:  0 577  gm/cm2   T score: -2 5      LEFT  FOREARM:    33% RADIUS BMD:  0 456  gm/cm2  T-score:  -3 9         IMPRESSION:     1  Osteoporosis      2  Since a DXA study from 5/9/2018, there has been:  A  STATISTICALLY SIGNIFICANT DECREASE in bone mineral density of  0 039 g/cm2 (5 5)% in the left total hip  A  STATISTICALLY SIGNIFICANT DECREASE in bone mineral density of  0 047 g/cm2 (9 4)% in the left radius         3  The 10 year risk of hip fracture is 5 0% with the 10 year risk of major osteoporotic fracture being 14% as calculated by the [facility]/WHO fracture risk assessment tool (FRAX)  Note: The patient's age exceeds the upper limits in the   [facility] database  The reported fracture risks are based on an age of 80 years, which is the upper limit in the [facility] database         4  The current NOF guidelines recommend treating patients with a T-score of -2 5 or less in the lumbar spine or hips, or in post-menopausal women and men over the age of 48 with low bone mass (osteopenia) and a FRAX 10 year risk score of >3% for hip   fracture and/or >20% for major osteoporotic fracture      5  The NOF recommends follow-up DXA in 1-2 years after initiating therapy for osteoporosis and every 2 years thereafter  More frequent evaluation is appropriate for patients with conditions associated with rapid bone loss, such as glucocorticoid   therapy   The interval between DXA screenings may be longer for individuals without major risk factors and initial T-score in the normal or upper low bone mass range         The FRAX algorithm has certain limitations:  -FRAX has not been validated in patients currently or previously treated with pharmacotherapy for osteoporosis  In such patients, clinical judgment must be exercised in interpreting FRAX scores  -Prior hip, vertebral and humeral fragility fractures appear to confer greater risk of subsequent fracture than fractures at other sites (this is especially true for individuals with severe vertebral fractures), but quantification of this incremental   risk is not possible with FRAX  -FRAX underestimates fracture risk in patients with history of multiple fragility fractures  -FRAX may underestimate fracture risk in patients with history of frequent falls   -It is not appropriate to use FRAX to monitor treatment response         WHO CLASSIFICATION:  Normal (a T-score of -1 0 or higher)  Low bone mineral density (a T-score of less than -1 0 but higher than -2 5)  Osteoporosis (a T-score of -2 5 or less)  Severe osteoporosis (a T-score of -2 5 or less with a fragility fracture)        LEAST SIGNIFICANT CHANGE (AT 95% C  I):  Lumbar spine 0 014 g/cm2; 1 6%  Total hip: 0 020 g/cm2; 2 4%  Forearm: 0 013 g/cm2; 2 6%               Most recent DEXA scan reviewed      The following portions of the patient's history were reviewed and updated as appropriate: allergies, current medications, past family history, past medical history, past social history, past surgical history and problem list     Review of Systems   Constitutional: Negative  HENT: Negative  Respiratory: Negative  Cardiovascular: Negative  Gastrointestinal: Negative  Endocrine: Negative  Genitourinary: Negative  Musculoskeletal: Negative  Skin: Negative  Neurological: Positive for tremors  Hematological: Negative  Psychiatric/Behavioral: Negative  Objective:      Ht 5' 6" (1 676 m)   Wt 72 9 kg (160 lb 12 8 oz)   LMP  (LMP Unknown)   BMI 25 95 kg/m²          Physical Exam  Vitals reviewed     Constitutional: General: She is not in acute distress  Appearance: Normal appearance  She is not ill-appearing, toxic-appearing or diaphoretic  HENT:      Head: Normocephalic and atraumatic  Right Ear: External ear normal       Left Ear: External ear normal       Nose: Nose normal  No congestion or rhinorrhea  Mouth/Throat:      Mouth: Mucous membranes are moist       Pharynx: Oropharynx is clear  No oropharyngeal exudate or posterior oropharyngeal erythema  Eyes:      General: No scleral icterus  Right eye: No discharge  Left eye: No discharge  Extraocular Movements: Extraocular movements intact  Conjunctiva/sclera: Conjunctivae normal       Pupils: Pupils are equal, round, and reactive to light  Neck:      Vascular: No carotid bruit  Cardiovascular:      Rate and Rhythm: Normal rate and regular rhythm  Pulses: Normal pulses  Heart sounds: Normal heart sounds  No murmur heard  No friction rub  No gallop  Pulmonary:      Effort: Pulmonary effort is normal  No respiratory distress  Breath sounds: Normal breath sounds  No stridor  No wheezing, rhonchi or rales  Chest:      Chest wall: No tenderness  Abdominal:      General: Abdomen is flat  Bowel sounds are normal  There is no distension  Palpations: Abdomen is soft  There is no mass  Tenderness: There is no abdominal tenderness  There is no right CVA tenderness, left CVA tenderness, guarding or rebound  Hernia: No hernia is present  Musculoskeletal:         General: No swelling, tenderness, deformity or signs of injury  Normal range of motion  Cervical back: Normal range of motion and neck supple  No rigidity  No muscular tenderness  Right lower leg: No edema  Left lower leg: No edema  Lymphadenopathy:      Cervical: No cervical adenopathy  Skin:     General: Skin is warm and dry  Coloration: Skin is not jaundiced or pale        Findings: No bruising, erythema, lesion or rash  Comments: Benign keratoses evidence of prior left mastectomy   Neurological:      General: No focal deficit present  Mental Status: She is alert and oriented to person, place, and time  Mental status is at baseline  Cranial Nerves: No cranial nerve deficit  Sensory: No sensory deficit  Motor: No weakness  Coordination: Coordination normal       Gait: Gait normal       Deep Tendon Reflexes: Reflexes normal       Comments: Pill rolling tremor-right side greater than left   Psychiatric:         Mood and Affect: Mood normal          Behavior: Behavior normal          Thought Content:  Thought content normal          Judgment: Judgment normal

## 2022-02-09 ENCOUNTER — TELEPHONE (OUTPATIENT)
Dept: INTERNAL MEDICINE CLINIC | Facility: CLINIC | Age: 87
End: 2022-02-09

## 2022-02-09 NOTE — TELEPHONE ENCOUNTER
brigitte called and said peterson has been having loose bowels sometimes for about a month and a half after breakfast at the facility  Sometimes she cant make it to the bathroom in time and soils herself  She believes she is eating cereal, fruit / banana, orange juice, thinks shes not drinking coffee  Annette Mitchell told her to try to stop that hoping it would improve  This week it happened twice already  She also told brigitte yesterday her calf was swollen, not red or painful but seems like it is not swollen today  Annette Mitchell may take a ride over later to see her leg to determine      [phone]

## 2022-02-11 ENCOUNTER — OFFICE VISIT (OUTPATIENT)
Dept: INTERNAL MEDICINE CLINIC | Facility: CLINIC | Age: 87
End: 2022-02-11
Payer: MEDICARE

## 2022-02-11 VITALS
RESPIRATION RATE: 12 BRPM | HEIGHT: 66 IN | DIASTOLIC BLOOD PRESSURE: 70 MMHG | SYSTOLIC BLOOD PRESSURE: 120 MMHG | WEIGHT: 160 LBS | HEART RATE: 72 BPM | BODY MASS INDEX: 25.71 KG/M2

## 2022-02-11 DIAGNOSIS — F41.8 DEPRESSION WITH ANXIETY: Chronic | ICD-10-CM

## 2022-02-11 DIAGNOSIS — R19.7 DIARRHEA, UNSPECIFIED TYPE: ICD-10-CM

## 2022-02-11 DIAGNOSIS — M79.89 LEFT LEG SWELLING: Primary | ICD-10-CM

## 2022-02-11 PROCEDURE — 99214 OFFICE O/P EST MOD 30 MIN: CPT | Performed by: INTERNAL MEDICINE

## 2022-02-11 NOTE — PROGRESS NOTES
Assessment/Plan:  1  Left leg swelling-this appears to be in the basis of peripheral venous disease  Does not have significant swelling on exam today  We reviewed that if she had dramatic swelling we would want to stay screen for associated DVT but I do not feel that is needed at present  We talked about potential support stockings for peripheral venous disease but this would be difficult for her to do with Parkinson's and staying in independent living  2  Symptom of intermittent diarrhea after certain meals-may be related to caffeine intake  Do not see any major issues here and I do not feel we need to proceed with additional investigation  She will decrease her caffeine intake  She has not noted any major issues with dairy products but will pay attention to this as well  3  General care-discussed her living situation closely with she and her daughter-she is considering potential assisted living but at this point resist that moved    All other problems as per note of January 10, 2022    Medical regimen unchanged    She will be seen at previously scheduled appointment  As previously noted she will now be seen by Dr Fanta Jj as her PCP    No problem-specific Assessment & Plan notes found for this encounter  A   Diagnoses and all orders for this visit:    Left leg swelling    Diarrhea, unspecified type    Depression with anxiety          Subjective:      Patient ID: Fletcher Ledesma is a 80 y o  female  She is seen today as an emergency appointment  She had called her daughter the other day that her left leg was swollen  She says she notes intermittent left leg swelling over the last several days  She has separate pain that starts in the left buttock and radiates down the leg  She has some right knee pain but not left knee pain  She denies numbness or tingling of the leg  She has known peripheral venous disease    She has not had any abrupt episodes of shortness of breath at rest   She does have some shortness of breath with activity  She is very frustrated with her overall situation  She is frustrated with her 8/Parkinson's disease/living in an independent living facility  Her family feels as though she should be transitioning to assisted living and I agree    She remains on therapy with generic Lexapro  This is a difficult situation  We may need to consider switching therapy to SNRI  She has ongoing significant tremors associated with Parkinson's disease  Most recent evaluation by neurology has been stable  I recommended previously she have repeat evaluation by Neurology but she defers  This patient denies any systemic symptoms  Specifically there has been no evidence of fever, night sweats, significant weight loss or significant decrease in appetite  She has not noted increasing orthopnea or proximal nocturnal dyspnea  She denies any chest pain or pressure  Most recent labs reviewed in detail/echo  Results for orders placed or performed during the hospital encounter of 12/15/21  -in reference to her diarrhea this tends to be a couple times for week and often after meals  She had formed stool on examination today  She is consuming increasing amounts of caffeine which may contribute  She denies bloody stool  She denies nocturnal diarrhea  She denies nausea vomiting or associated abdominal pain                              The following portions of the patient's history were reviewed and updated as appropriate: allergies, current medications, past family history, past medical history, past social history, past surgical history and problem list     Review of Systems   Constitutional: Negative  HENT: Negative  Respiratory: Negative  Cardiovascular: Positive for leg swelling  Gastrointestinal: Positive for diarrhea  Endocrine: Negative  Genitourinary: Negative  Musculoskeletal: Negative  Skin: Negative  Neurological: Negative  Hematological: Negative  Psychiatric/Behavioral: Negative  Objective:      Ht 5' 6" (1 676 m)   Wt 72 6 kg (160 lb)   LMP  (LMP Unknown)   BMI 25 82 kg/m²          Physical Exam  Vitals reviewed  Constitutional:       General: She is not in acute distress  Appearance: Normal appearance  She is not ill-appearing, toxic-appearing or diaphoretic  HENT:      Head: Normocephalic and atraumatic  Right Ear: External ear normal       Left Ear: Tympanic membrane and external ear normal       Nose: Nose normal  No congestion or rhinorrhea  Mouth/Throat:      Mouth: Mucous membranes are moist       Pharynx: Oropharynx is clear  No oropharyngeal exudate or posterior oropharyngeal erythema  Eyes:      General: No scleral icterus  Right eye: No discharge  Left eye: No discharge  Extraocular Movements: Extraocular movements intact  Conjunctiva/sclera: Conjunctivae normal       Pupils: Pupils are equal, round, and reactive to light  Neck:      Vascular: No carotid bruit  Cardiovascular:      Rate and Rhythm: Normal rate and regular rhythm  Pulses: Normal pulses  Heart sounds: Normal heart sounds  No murmur heard  No friction rub  No gallop  Pulmonary:      Effort: Pulmonary effort is normal  No respiratory distress  Breath sounds: Normal breath sounds  No stridor  No wheezing, rhonchi or rales  Chest:      Chest wall: No tenderness  Abdominal:      General: Abdomen is flat  Bowel sounds are normal  There is no distension  Palpations: Abdomen is soft  There is no mass  Tenderness: There is no abdominal tenderness  There is no right CVA tenderness, left CVA tenderness, guarding or rebound  Hernia: No hernia is present  Genitourinary:     Rectum: Normal    Musculoskeletal:         General: No swelling, tenderness, deformity or signs of injury  Normal range of motion  Cervical back: Normal range of motion and neck supple  No rigidity   No muscular tenderness  Right lower leg: No edema  Left lower leg: Edema present  Comments: Trace edema left lower leg with prominent peripheral venous disease   Lymphadenopathy:      Cervical: No cervical adenopathy  Skin:     General: Skin is warm and dry  Coloration: Skin is not jaundiced or pale  Findings: No bruising, erythema, lesion or rash  Neurological:      General: No focal deficit present  Mental Status: She is alert and oriented to person, place, and time  Mental status is at baseline  Cranial Nerves: No cranial nerve deficit  Sensory: No sensory deficit  Motor: No weakness  Coordination: Coordination normal       Gait: Gait normal       Deep Tendon Reflexes: Reflexes normal       Comments: Pill rolling tremor as before   Psychiatric:         Behavior: Behavior normal          Thought Content:  Thought content normal          Judgment: Judgment normal       Comments: Dysphoric mood

## 2022-03-22 ENCOUNTER — OFFICE VISIT (OUTPATIENT)
Dept: INTERNAL MEDICINE CLINIC | Facility: CLINIC | Age: 87
End: 2022-03-22
Payer: MEDICARE

## 2022-03-22 VITALS
HEART RATE: 65 BPM | OXYGEN SATURATION: 98 % | TEMPERATURE: 98.5 F | HEIGHT: 66 IN | RESPIRATION RATE: 16 BRPM | WEIGHT: 159.6 LBS | SYSTOLIC BLOOD PRESSURE: 122 MMHG | DIASTOLIC BLOOD PRESSURE: 70 MMHG | BODY MASS INDEX: 25.65 KG/M2

## 2022-03-22 DIAGNOSIS — M81.0 AGE-RELATED OSTEOPOROSIS WITHOUT CURRENT PATHOLOGICAL FRACTURE: Chronic | ICD-10-CM

## 2022-03-22 DIAGNOSIS — M54.16 LUMBAR RADICULOPATHY: ICD-10-CM

## 2022-03-22 DIAGNOSIS — K31.84 GASTROPARESIS: Chronic | ICD-10-CM

## 2022-03-22 DIAGNOSIS — G20 PARKINSON'S DISEASE (HCC): Chronic | ICD-10-CM

## 2022-03-22 DIAGNOSIS — I10 PRIMARY HYPERTENSION: Chronic | ICD-10-CM

## 2022-03-22 DIAGNOSIS — Z99.89 WALKER AS AMBULATION AID: ICD-10-CM

## 2022-03-22 DIAGNOSIS — F41.8 DEPRESSION WITH ANXIETY: Primary | Chronic | ICD-10-CM

## 2022-03-22 PROCEDURE — 99214 OFFICE O/P EST MOD 30 MIN: CPT | Performed by: INTERNAL MEDICINE

## 2022-03-22 RX ORDER — TRAMADOL HYDROCHLORIDE 50 MG/1
25 TABLET ORAL 2 TIMES DAILY PRN
Qty: 30 TABLET | Refills: 0 | Status: SHIPPED | OUTPATIENT
Start: 2022-03-22 | End: 2022-05-31 | Stop reason: SDUPTHER

## 2022-03-22 NOTE — PATIENT INSTRUCTIONS
1  Change lexapro to the evening  2  Neurology appointment  3  Tramadol, 1/2 tablet as needed during the day -> may cause drowsiness  4  No lorazepam on days you take tramadol  5  Physical therapy  6  Blood work  7  Return end of next 1 week for prolia injection(nurse visit)  8   Back x-rays

## 2022-03-22 NOTE — PROGRESS NOTES
Assessment/Plan:     Diagnoses and all orders for this visit:    Depression with anxiety  Comments:  c/w lexapro but switch dose to the evening  f/u in 3 mos or prn  Orders:  -     Hepatic function panel; Future  -     CBC and differential    Lumbar radiculopathy  Comments:  symptoms ongoing, check lumbar x-ray and tramadol ordered for prn use  d/w patient AE of med and no lorazepam on days taking tramadol  Orders:  -     traMADol (ULTRAM) 50 mg tablet; Take 0 5 tablets (25 mg total) by mouth 2 (two) times a day as needed for moderate pain  -     Ambulatory Referral to Physical Therapy; Future  -     XR spine lumbar minimum 4 views non injury; Future    Age-related osteoporosis without current pathological fracture  Comments:  receives prolia every 6 mos  BW ordered and patient scheduled for RN visit next week to recive prolia(pending BW results)  Orders:  -     Basic metabolic panel; Future  -     Vitamin D 25 hydroxy  -     Magnesium  -     Hepatic function panel; Future  -     CBC and differential    Parkinson's disease ([facility] [address] )  Comments:  taking sinemet TID, c/w rx and f/u neurology(due to ongoing symptoms)  Orders:  -     Ambulatory Referral to Neurology; Future  -     Hepatic function panel; Future  -     CBC and differential    Primary hypertension  Comments:  BP doing well, c/w CCB    Gastroparesis    Walker as ambulation aid  -     Ambulatory Referral to Physical Therapy; Future       [place]MP reviewed and no rx found  Subjective:      Patient ID: Shelli De Luna is a 80 y o  female  HPI    New to me, here to establish care  Used to see Dr Urbano Szymanski as PCP  Here with daughter who provides add'l history  Faizan Will walks with a cane and walker, she has been having right sided low back pain with pain radiating down her right leg to knee  No leg weakness or numbness  She has a hx of lumbar radiculopathy, was prescribed tramadol but she hasn't been taking this    She attended PT and does not want to go back   She feels tired during the day and is taking lexapro in the morning  She sleeps well at nighttime and daughter states she has not been taking lorazepam at all(last rx refill was from 2020)  She has osteoporosis and is due for prolia this month  She has parkinson's disease and takes sinemet TID but is having trouble with slow gait lately  She refuses to see neurology(used to see Dr Hayden Blank before he left [facility])  Fran Carmen lives in independent living at [facility] and daughter believes she may benefit from assisted living but patient is refusing to transition to AL  ROS is otherwise negative, no other complaints  Past Medical History:   Diagnosis Date    Anxiety     Arthritis     Gastroparesis     History of atherosclerosis     History of breast cancer     History of osteoarthritis     Long term use of drug     Malignant neoplasm of breast ([facility] [address] ) 04/11/2011    left w/mastectomy; age 80    Osteomalacia      Vitals:    03/22/22 1259   BP: 122/70   Pulse: 65   Resp: 16   Temp: 98 5 °F (36 9 °C)   SpO2: 98%   Weight: 72 4 kg (159 lb 9 6 oz)   Height: 5' 6" (1 676 m)     Body mass index is 25 76 kg/m²      Current Outpatient Medications:     acetaminophen (TYLENOL) 325 mg tablet, Take 325 mg by mouth 2 (two) times a day before breakfast and lunch, Disp: , Rfl:     calcium citrate-vitamin D (CITRACAL+D) 315-200 MG-UNIT per tablet, Take 1 tablet by mouth daily, Disp: , Rfl:     carbidopa-levodopa (SINEMET)  mg per tablet, Take 1 tablet by mouth 3 (three) times a day, Disp: 270 tablet, Rfl: 3    chlorhexidine (PERIDEX) 0 12 % solution, Use as directed , Disp: , Rfl: 0    cholecalciferol (VITAMIN D3) 1,000 units tablet, Take 3,000 Units by mouth daily , Disp: , Rfl:     escitalopram (LEXAPRO) 10 mg tablet, TAKE 1/2 TABLET BY MOUTH TWO TIMES DAILY, Disp: 90 tablet, Rfl: 3    LORazepam (ATIVAN) 0 5 mg tablet, TAKE 1 TABLET BY MOUTH TWO TIMES DAILY, Disp: 60 tablet, Rfl: 0    traMADol (ULTRAM) 50 mg tablet, Take 0 5 tablets (25 mg total) by mouth 2 (two) times a day as needed for moderate pain, Disp: 30 tablet, Rfl: 0    diltiazem (CARDIZEM) 30 mg tablet, Take 1 tablet (30 mg total) by mouth 2 (two) times a day, Disp: 120 tablet, Rfl: 3  No Known Allergies      Review of Systems   Constitutional: Positive for fatigue  Negative for fever  HENT: Negative for congestion  Eyes: Negative for visual disturbance  Respiratory: Negative for shortness of breath  Cardiovascular: Negative for chest pain  Gastrointestinal: Negative for abdominal pain  Endocrine: Negative for polyuria  Genitourinary: Negative for difficulty urinating  Musculoskeletal: Positive for back pain and gait problem  Skin: Negative for rash  Allergic/Immunologic: Negative for immunocompromised state  Neurological: Positive for tremors  Psychiatric/Behavioral: Negative for dysphoric mood and sleep disturbance  Objective:      /70   Pulse 65   Temp 98 5 °F (36 9 °C)   Resp 16   Ht 5' 6" (1 676 m)   Wt 72 4 kg (159 lb 9 6 oz)   LMP  (LMP Unknown)   SpO2 98%   BMI 25 76 kg/m²          Physical Exam  Vitals reviewed  Constitutional:       General: She is not in acute distress  Appearance: Normal appearance  HENT:      Head: Normocephalic and atraumatic  Right Ear: Tympanic membrane normal       Left Ear: Tympanic membrane normal    Eyes:      Conjunctiva/sclera: Conjunctivae normal    Cardiovascular:      Rate and Rhythm: Normal rate and regular rhythm  Heart sounds: No murmur heard  Pulmonary:      Effort: Pulmonary effort is normal       Breath sounds: No wheezing or rales  Abdominal:      General: Bowel sounds are normal       Palpations: Abdomen is soft  Tenderness: There is no abdominal tenderness  Musculoskeletal:      Right lower leg: No edema  Left lower leg: No edema  Neurological:      Mental Status: She is alert  Mental status is at baseline  Motor: Tremor present        Comments: Using cane to assist with ambulation   Psychiatric:         Mood and Affect: Mood normal          Behavior: Behavior normal

## 2022-03-24 ENCOUNTER — HOSPITAL ENCOUNTER (OUTPATIENT)
Dept: RADIOLOGY | Facility: HOSPITAL | Age: 87
Discharge: HOME | End: 2022-03-24
Payer: MEDICARE

## 2022-03-24 ENCOUNTER — APPOINTMENT (OUTPATIENT)
Dept: LAB | Facility: CLINIC | Age: 87
End: 2022-03-24
Payer: MEDICARE

## 2022-03-24 DIAGNOSIS — M81.0 AGE-RELATED OSTEOPOROSIS WITHOUT CURRENT PATHOLOGICAL FRACTURE: Chronic | ICD-10-CM

## 2022-03-24 DIAGNOSIS — F41.8 DEPRESSION WITH ANXIETY: Chronic | ICD-10-CM

## 2022-03-24 DIAGNOSIS — M54.16 LUMBAR RADICULOPATHY: ICD-10-CM

## 2022-03-24 DIAGNOSIS — G20 PARKINSON'S DISEASE (HCC): Chronic | ICD-10-CM

## 2022-03-24 LAB
25(OH)D3 SERPL-MCNC: 43.3 NG/ML (ref 30–100)
ALBUMIN SERPL BCP-MCNC: 3.6 G/DL (ref 3.5–5)
ALP SERPL-CCNC: 60 U/L (ref 46–116)
ALT SERPL W P-5'-P-CCNC: 9 U/L (ref 12–78)
ANION GAP SERPL CALCULATED.3IONS-SCNC: 3 MMOL/L (ref 4–13)
AST SERPL W P-5'-P-CCNC: 16 U/L (ref 5–45)
BASOPHILS # BLD AUTO: 0.05 THOUSANDS/ΜL (ref 0–0.1)
BASOPHILS NFR BLD AUTO: 1 % (ref 0–1)
BILIRUB DIRECT SERPL-MCNC: 0.12 MG/DL (ref 0–0.2)
BILIRUB SERPL-MCNC: 1.32 MG/DL (ref 0.2–1)
BUN SERPL-MCNC: 19 MG/DL (ref 5–25)
CALCIUM SERPL-MCNC: 9.3 MG/DL (ref 8.3–10.1)
CHLORIDE SERPL-SCNC: 106 MMOL/L (ref 100–108)
CO2 SERPL-SCNC: 30 MMOL/L (ref 21–32)
CREAT SERPL-MCNC: 0.81 MG/DL (ref 0.6–1.3)
EOSINOPHIL # BLD AUTO: 0.22 THOUSAND/ΜL (ref 0–0.61)
EOSINOPHIL NFR BLD AUTO: 3 % (ref 0–6)
ERYTHROCYTE [DISTWIDTH] IN BLOOD BY AUTOMATED COUNT: 12.5 % (ref 11.6–15.1)
GFR SERPL CREATININE-BSD FRML MDRD: 61 ML/MIN/1.73SQ M
GLUCOSE P FAST SERPL-MCNC: 96 MG/DL (ref 65–99)
HCT VFR BLD AUTO: 44.5 % (ref 34.8–46.1)
HGB BLD-MCNC: 14.5 G/DL (ref 11.5–15.4)
IMM GRANULOCYTES # BLD AUTO: 0.03 THOUSAND/UL (ref 0–0.2)
IMM GRANULOCYTES NFR BLD AUTO: 0 % (ref 0–2)
LYMPHOCYTES # BLD AUTO: 2.07 THOUSANDS/ΜL (ref 0.6–4.47)
LYMPHOCYTES NFR BLD AUTO: 30 % (ref 14–44)
MAGNESIUM SERPL-MCNC: 2.2 MG/DL (ref 1.6–2.6)
MCH RBC QN AUTO: 32.2 PG (ref 26.8–34.3)
MCHC RBC AUTO-ENTMCNC: 32.6 G/DL (ref 31.4–37.4)
MCV RBC AUTO: 99 FL (ref 82–98)
MONOCYTES # BLD AUTO: 0.68 THOUSAND/ΜL (ref 0.17–1.22)
MONOCYTES NFR BLD AUTO: 10 % (ref 4–12)
NEUTROPHILS # BLD AUTO: 3.81 THOUSANDS/ΜL (ref 1.85–7.62)
NEUTS SEG NFR BLD AUTO: 56 % (ref 43–75)
NRBC BLD AUTO-RTO: 0 /100 WBCS
PLATELET # BLD AUTO: 257 THOUSANDS/UL (ref 149–390)
PMV BLD AUTO: 9.5 FL (ref 8.9–12.7)
POTASSIUM SERPL-SCNC: 3.9 MMOL/L (ref 3.5–5.3)
PROT SERPL-MCNC: 7 G/DL (ref 6.4–8.2)
RBC # BLD AUTO: 4.51 MILLION/UL (ref 3.81–5.12)
SODIUM SERPL-SCNC: 139 MMOL/L (ref 136–145)
WBC # BLD AUTO: 6.86 THOUSAND/UL (ref 4.31–10.16)

## 2022-03-24 PROCEDURE — 36415 COLL VENOUS BLD VENIPUNCTURE: CPT | Performed by: INTERNAL MEDICINE

## 2022-03-24 PROCEDURE — 72110 X-RAY EXAM L-2 SPINE 4/>VWS: CPT

## 2022-03-24 PROCEDURE — 80048 BASIC METABOLIC PNL TOTAL CA: CPT

## 2022-03-24 PROCEDURE — 82306 VITAMIN D 25 HYDROXY: CPT | Performed by: INTERNAL MEDICINE

## 2022-03-24 PROCEDURE — 83735 ASSAY OF MAGNESIUM: CPT | Performed by: INTERNAL MEDICINE

## 2022-03-24 PROCEDURE — 85025 COMPLETE CBC W/AUTO DIFF WBC: CPT | Performed by: INTERNAL MEDICINE

## 2022-03-24 PROCEDURE — 80076 HEPATIC FUNCTION PANEL: CPT

## 2022-03-25 ENCOUNTER — TELEPHONE (OUTPATIENT)
Dept: INTERNAL MEDICINE CLINIC | Facility: CLINIC | Age: 87
End: 2022-03-25

## 2022-03-25 NOTE — TELEPHONE ENCOUNTER
----- Message from Tiffanie Pritchard DO sent at 3/25/2022  3:48 PM EDT -----  BW is back and looks good, except for a mild increase in bilirubin(a product made in the liver)    This could be elevated if Zhang Ferrara was fasting before completing the BW   Okay to proceed with prolia injection next week and we can re-check bilirubin blood test in a couple of weeks but NO fasting this time, thank you

## 2022-03-29 ENCOUNTER — TELEPHONE (OUTPATIENT)
Dept: INTERNAL MEDICINE CLINIC | Facility: CLINIC | Age: 87
End: 2022-03-29

## 2022-03-29 DIAGNOSIS — R17 ELEVATED BILIRUBIN: Primary | ICD-10-CM

## 2022-03-31 ENCOUNTER — CLINICAL SUPPORT (OUTPATIENT)
Dept: INTERNAL MEDICINE CLINIC | Facility: CLINIC | Age: 87
End: 2022-03-31
Payer: MEDICARE

## 2022-03-31 DIAGNOSIS — M81.0 AGE-RELATED OSTEOPOROSIS WITHOUT CURRENT PATHOLOGICAL FRACTURE: Primary | ICD-10-CM

## 2022-03-31 PROCEDURE — 96372 THER/PROPH/DIAG INJ SC/IM: CPT

## 2022-05-31 DIAGNOSIS — M54.16 LUMBAR RADICULOPATHY: ICD-10-CM

## 2022-05-31 RX ORDER — TRAMADOL HYDROCHLORIDE 50 MG/1
25 TABLET ORAL 2 TIMES DAILY PRN
Qty: 30 TABLET | Refills: 0 | Status: SHIPPED | OUTPATIENT
Start: 2022-05-31 | End: 2022-06-23 | Stop reason: SDUPTHER

## 2022-06-20 ENCOUNTER — APPOINTMENT (EMERGENCY)
Dept: RADIOLOGY | Facility: HOSPITAL | Age: 87
End: 2022-06-20
Payer: MEDICARE

## 2022-06-20 ENCOUNTER — HOSPITAL ENCOUNTER (EMERGENCY)
Facility: HOSPITAL | Age: 87
Discharge: HOME | End: 2022-06-20
Attending: EMERGENCY MEDICINE
Payer: MEDICARE

## 2022-06-20 VITALS
HEART RATE: 72 BPM | OXYGEN SATURATION: 96 % | SYSTOLIC BLOOD PRESSURE: 156 MMHG | TEMPERATURE: 97.8 F | RESPIRATION RATE: 18 BRPM | DIASTOLIC BLOOD PRESSURE: 63 MMHG

## 2022-06-20 DIAGNOSIS — M54.16 LUMBAR RADICULOPATHY: Primary | ICD-10-CM

## 2022-06-20 PROCEDURE — 99283 EMERGENCY DEPT VISIT LOW MDM: CPT

## 2022-06-20 PROCEDURE — 99284 EMERGENCY DEPT VISIT MOD MDM: CPT | Performed by: EMERGENCY MEDICINE

## 2022-06-20 PROCEDURE — 72100 X-RAY EXAM L-S SPINE 2/3 VWS: CPT

## 2022-06-20 NOTE — DISCHARGE INSTRUCTIONS
Please take your tramadol as prescribed  , please try to take it when your pain is lower as opposed to when it is more severe , when you have a small amount of pain med tramadol work better  , by the time it was severe taking tramadol will take longer to have full affect

## 2022-06-20 NOTE — ED PROVIDER NOTES
History  Chief Complaint   Patient presents with    Hip Pain     Patient arrives to the ER from [facility] ([address] [place] [address])  With complaints of left hip pain and radiates down left leg  No injury, no distress  History provided by:  Patient  Hip Pain  Location:  Right and left hip and lower back, pain shoots down legs to foot  has been going on for 3 years  worse this morning  Quality:  Sharp  Severity:  Moderate  Onset quality:  Gradual  Duration:  3 hours  Timing:  Constant  Progression:  Partially resolved (after taking tramadol)  Chronicity:  New  Relieved by:  Tramdol, took one 2 hours ago and pain greatly improved  Worsened by: Movement  Associated symptoms: no abdominal pain, no chest pain, no congestion, no cough, no diarrhea, no fever, no headaches, no nausea, no rash, no shortness of breath, no sore throat, no vomiting and no wheezing        Prior to Admission Medications   Prescriptions Last Dose Informant Patient Reported?  Taking?   acetaminophen (TYLENOL) 325 mg tablet  Self Yes No   Sig: Take 325 mg by mouth 2 (two) times a day before breakfast and lunch   calcium citrate-vitamin D (CITRACAL+D) 315-200 MG-UNIT per tablet  Self Yes No   Sig: Take 1 tablet by mouth daily   carbidopa-levodopa (SINEMET)  mg per tablet   No No   Sig: Take 1 tablet by mouth 3 (three) times a day   chlorhexidine (PERIDEX) 0 12 % solution   Yes No   Sig: Use as directed    cholecalciferol (VITAMIN D3) 1,000 units tablet  Self Yes No   Sig: Take 3,000 Units by mouth daily    diltiazem (CARDIZEM) 30 mg tablet   No No   Sig: Take 1 tablet (30 mg total) by mouth 2 (two) times a day   escitalopram (LEXAPRO) 10 mg tablet   No No   Sig: TAKE 1/2 TABLET BY MOUTH TWO TIMES DAILY   traMADol (ULTRAM) 50 mg tablet   No No   Sig: Take 0 5 tablets (25 mg total) by mouth 2 (two) times a day as needed for moderate pain      Facility-Administered Medications: None       Past Medical History:   Diagnosis Date    Anxiety     Arthritis     Gastroparesis     History of atherosclerosis     History of breast cancer     History of osteoarthritis     Long term use of drug     Malignant neoplasm of breast ([facility] [address] ) 04/11/2011    left w/mastectomy; age 80    Osteomalacia        Past Surgical History:   Procedure Laterality Date    BREAST BIOPSY Left 04/11/2011    u/s core-positive    HYSTERECTOMY  1974    age 52    MASTECTOMY Left 05/04/2011    malignant       Family History   Problem Relation Age of Onset    Coronary artery disease Mother     Coronary artery disease Family     Hyperlipidemia Family     Osteoarthritis Family     Lung cancer Sister 48     I have reviewed and agree with the history as documented  E-Cigarette/Vaping    E-Cigarette Use Never User      E-Cigarette/Vaping Substances    Nicotine No     THC No     CBD No     Flavoring No     Other No     Unknown No      Social History     Tobacco Use    Smoking status: Never Smoker    Smokeless tobacco: Never Used   Vaping Use    Vaping Use: Never used   Substance Use Topics    Alcohol use: Yes     Comment: Social drinker    Drug use: No       Review of Systems   Constitutional: Negative for activity change, chills, diaphoresis and fever  HENT: Negative for congestion, sinus pressure and sore throat  Eyes: Negative for pain and visual disturbance  Respiratory: Negative for cough, chest tightness, shortness of breath, wheezing and stridor  Cardiovascular: Negative for chest pain and palpitations  Gastrointestinal: Negative for abdominal distention, abdominal pain, constipation, diarrhea, nausea and vomiting  Genitourinary: Negative for dysuria and frequency  Musculoskeletal: Negative for neck pain and neck stiffness  Skin: Negative for rash  Neurological: Negative for dizziness, speech difficulty, light-headedness, numbness and headaches  Physical Exam  Physical Exam  Vitals reviewed     Constitutional:       General: She is not in acute distress  Appearance: She is well-developed  She is not diaphoretic  HENT:      Head: Normocephalic and atraumatic  Right Ear: External ear normal       Left Ear: External ear normal       Nose: Nose normal    Eyes:      General:         Right eye: No discharge  Left eye: No discharge  Pupils: Pupils are equal, round, and reactive to light  Neck:      Trachea: No tracheal deviation  Cardiovascular:      Rate and Rhythm: Normal rate and regular rhythm  Heart sounds: Normal heart sounds  No murmur heard  Pulmonary:      Effort: Pulmonary effort is normal  No respiratory distress  Breath sounds: Normal breath sounds  No stridor  Abdominal:      General: There is no distension  Palpations: Abdomen is soft  Tenderness: There is no abdominal tenderness  There is no guarding or rebound  Musculoskeletal:         General: Normal range of motion  Cervical back: Normal range of motion and neck supple  Comments: No spinous process tenderness, decreased range of motion bilateral hips but she says this is chronic, does not elicit discomfort, able to ambulate with assistance of a walker   Skin:     General: Skin is warm and dry  Coloration: Skin is not pale  Findings: No erythema  Neurological:      General: No focal deficit present  Mental Status: She is alert and oriented to person, place, and time           Vital Signs  ED Triage Vitals   Temperature Pulse Respirations Blood Pressure SpO2   06/20/22 1339 06/20/22 1339 06/20/22 1339 06/20/22 1343 06/20/22 1339   98 4 °F (36 9 °C) 76 18 156/63 96 %      Temp src Heart Rate Source Patient Position - Orthostatic VS BP Location FiO2 (%)   -- -- -- -- --             Pain Score       --                  Vitals:    06/20/22 1339 06/20/22 1343 06/20/22 1345   BP:  156/63    Pulse: 76  72         Visual Acuity      ED Medications  Medications - No data to display    Diagnostic Studies  Results Reviewed None                 XR lumbar spine 2 or 3 views   ED Interpretation by Janet Alfred DO (06/20 7475)   Severe arthritis, advanced degenerative changes, no acute fractures  Final Result by Salma Herrera MD (06/20 3820)      No acute fractures identified  Advanced multilevel degenerative changes of the lumbar spine  Workstation performed: [id number]                    Procedures  Procedures         ED Course                               SBIRT 22yo+    Flowsheet Row Most Recent Value   SBIRT (23 yo +)    In order to provide better care to our patients, we are screening all of our patients for alcohol and drug use  Would it be okay to ask you these screening questions? Unable to answer at this time Filed at: 06/20/2022 1341                    MDM  Number of Diagnoses or Management Options  Diagnosis management comments:  Patient able to ambulate, long history of back pain  , did improve when she took half a pill a tramadol which is what she is prescribed  , she admits that she did not take it earlier in the day could she does not like taking it all the time  , advised her that she should be taking her medication earlier as opposed to later, as her pain is controlled I would not escalate pain medications  , will discharge  Amount and/or Complexity of Data Reviewed  Tests in the radiology section of CPT®: ordered and reviewed  Review and summarize past medical records: yes  Independent visualization of images, tracings, or specimens: yes        Disposition  Final diagnoses:   Lumbar radiculopathy     Time reflects when diagnosis was documented in both MDM as applicable and the Disposition within this note     Time User Action Codes Description Comment    6/20/2022  2:51 PM Gaetano Virgen Add [P16 41] Lumbar radiculopathy       ED Disposition     ED Disposition   Discharge    Condition   Stable    Date/Time   Mon Jun 20, 2022  2:51 PM    Comment   Anthony Mix discharge to home/self care  Follow-up Information    None         Patient's Medications   Discharge Prescriptions    No medications on file       No discharge procedures on file      PDMP Review       Value Time User    PDMP Reviewed  Yes 5/31/2022  1:45 PM Tiffanie Pritchard DO          ED Provider  Electronically Signed by           Jaclyn Hinds DO  06/20/22 8808

## 2022-06-23 ENCOUNTER — OFFICE VISIT (OUTPATIENT)
Dept: INTERNAL MEDICINE CLINIC | Facility: CLINIC | Age: 87
End: 2022-06-23
Payer: MEDICARE

## 2022-06-23 VITALS
HEIGHT: 66 IN | WEIGHT: 154 LBS | BODY MASS INDEX: 24.75 KG/M2 | DIASTOLIC BLOOD PRESSURE: 60 MMHG | SYSTOLIC BLOOD PRESSURE: 132 MMHG | RESPIRATION RATE: 18 BRPM | OXYGEN SATURATION: 98 % | HEART RATE: 73 BPM | TEMPERATURE: 97.6 F

## 2022-06-23 DIAGNOSIS — M81.0 AGE-RELATED OSTEOPOROSIS WITHOUT CURRENT PATHOLOGICAL FRACTURE: Chronic | ICD-10-CM

## 2022-06-23 DIAGNOSIS — E55.9 VITAMIN D DEFICIENCY: ICD-10-CM

## 2022-06-23 DIAGNOSIS — M54.16 LUMBAR RADICULOPATHY: Primary | Chronic | ICD-10-CM

## 2022-06-23 DIAGNOSIS — M25.551 BILATERAL HIP PAIN: ICD-10-CM

## 2022-06-23 DIAGNOSIS — M25.552 BILATERAL HIP PAIN: ICD-10-CM

## 2022-06-23 DIAGNOSIS — F41.8 DEPRESSION WITH ANXIETY: Chronic | ICD-10-CM

## 2022-06-23 DIAGNOSIS — I10 PRIMARY HYPERTENSION: ICD-10-CM

## 2022-06-23 DIAGNOSIS — G20 PARKINSON'S DISEASE (HCC): Chronic | ICD-10-CM

## 2022-06-23 PROCEDURE — 99214 OFFICE O/P EST MOD 30 MIN: CPT | Performed by: INTERNAL MEDICINE

## 2022-06-23 RX ORDER — TRAMADOL HYDROCHLORIDE 50 MG/1
TABLET ORAL
Qty: 75 TABLET | Refills: 0 | Status: SHIPPED | OUTPATIENT
Start: 2022-06-23

## 2022-06-23 NOTE — PROGRESS NOTES
Assessment/Plan:     Diagnoses and all orders for this visit:    Lumbar radiculopathy  Comments:  increase tramadol to 1 tab BID with prn 3rd dose in middle of day  d/w patient AE Of tramadol and DDI with lexapro  c/w PT at her [facility] facility  Orders:  -     Comprehensive metabolic panel; Future  -     CBC and differential; Future  -     traMADol (ULTRAM) 50 mg tablet; Take one tablet in the morning and one in the evening  Okay to take 1 tablet mid-day for pain if needed  -     XR hips bilateral 3-4 vw w pelvis if performed; Future    Bilateral hip pain  Comments:  check hip x-rays to r/o OA(contributing to LBP?)  Orders:  -     XR hips bilateral 3-4 vw w pelvis if performed; Future    Depression with anxiety  Comments:  taking lexapro and stable, c/w rx  Orders:  -     Comprehensive metabolic panel; Future  -     CBC and differential; Future    Primary hypertension  Comments:  stable, c/w CCB  Orders:  -     Comprehensive metabolic panel; Future  -     CBC and differential; Future  -     Magnesium; Future    Parkinson's disease [facility])  Comments:  takes sinemet and sees neurology for ongoing care  Orders:  -     Comprehensive metabolic panel; Future  -     CBC and differential; Future    Age-related osteoporosis without current pathological fracture  Comments:  receiving prolia injection every 6 mos, c/w rx  Orders:  -     Magnesium; Future    Vitamin D deficiency  -     Vitamin D 25 hydroxy; Future          Subjective:      Patient ID: Tez Abernathy is a 80 y o  female  HPI    Here for follow up, went to ER last week for ongoing back and hip pain  Here with daugther during today's appt  She has not been taking tramadol, but after pain is already at high level  She is using walker, no falls recently  She takes lexapro and daughter states this helps her, prefers not to reduce dose  She had prolia in March and is scheduling for this again in September    ROS otherwise negative, no other complaints  Past Medical History:   Diagnosis Date    Anxiety     Arthritis     Gastroparesis     History of atherosclerosis     History of breast cancer     History of osteoarthritis     Long term use of drug     Malignant neoplasm of breast ([facility] [address] ) 04/11/2011    left w/mastectomy; age 80    Osteomalacia      Vitals:    06/23/22 1324   BP: 132/60   BP Location: Left arm   Patient Position: Sitting   Cuff Size: Adult   Pulse: 73   Resp: 18   Temp: 97 6 °F (36 4 °C)   TempSrc: Tympanic   SpO2: 98%   Weight: 69 9 kg (154 lb)   Height: 5' 6" (1 676 m)     Body mass index is 24 86 kg/m²  Current Outpatient Medications:     acetaminophen (TYLENOL) 325 mg tablet, Take 325 mg by mouth 2 (two) times a day before breakfast and lunch, Disp: , Rfl:     calcium citrate-vitamin D (CITRACAL+D) 315-200 MG-UNIT per tablet, Take 1 tablet by mouth daily, Disp: , Rfl:     carbidopa-levodopa (SINEMET)  mg per tablet, Take 1 tablet by mouth 3 (three) times a day, Disp: 270 tablet, Rfl: 3    chlorhexidine (PERIDEX) 0 12 % solution, Use as directed , Disp: , Rfl: 0    cholecalciferol (VITAMIN D3) 1,000 units tablet, Take 3,000 Units by mouth daily , Disp: , Rfl:     escitalopram (LEXAPRO) 10 mg tablet, TAKE 1/2 TABLET BY MOUTH TWO TIMES DAILY, Disp: 90 tablet, Rfl: 3    traMADol (ULTRAM) 50 mg tablet, Take one tablet in the morning and one in the evening  Okay to take 1 tablet mid-day for pain if needed, Disp: 75 tablet, Rfl: 0    diltiazem (CARDIZEM) 30 mg tablet, Take 1 tablet (30 mg total) by mouth 2 (two) times a day, Disp: 120 tablet, Rfl: 3  No Known Allergies      Review of Systems   Constitutional: Negative for fever  HENT: Negative for congestion  Eyes: Negative for visual disturbance  Respiratory: Negative for shortness of breath  Cardiovascular: Negative for chest pain  Gastrointestinal: Negative for abdominal pain  Endocrine: Negative for polyuria     Genitourinary: Negative for difficulty urinating  Musculoskeletal: Positive for arthralgias, back pain and gait problem  Skin: Negative for rash  Neurological: Negative for dizziness  Psychiatric/Behavioral: Negative for dysphoric mood  Objective:      /60 (BP Location: Left arm, Patient Position: Sitting, Cuff Size: Adult)   Pulse 73   Temp 97 6 °F (36 4 °C) (Tympanic)   Resp 18   Ht 5' 6" (1 676 m)   Wt 69 9 kg (154 lb)   LMP  (LMP Unknown)   SpO2 98%   BMI 24 86 kg/m²          Physical Exam  Vitals reviewed  Constitutional:       Appearance: Normal appearance  HENT:      Head: Normocephalic and atraumatic  Eyes:      Conjunctiva/sclera: Conjunctivae normal    Cardiovascular:      Rate and Rhythm: Normal rate and regular rhythm  Heart sounds: No murmur heard  Pulmonary:      Effort: Pulmonary effort is normal       Breath sounds: No wheezing or rales  Abdominal:      General: Bowel sounds are normal       Palpations: Abdomen is soft  Tenderness: There is no abdominal tenderness  Musculoskeletal:      Lumbar back: No tenderness or bony tenderness  Negative right straight leg raise test (seated SLR test) and negative left straight leg raise test (seated SLR test)  Right hip: No tenderness  Normal range of motion  Left hip: No tenderness  Normal range of motion  Right lower leg: No edema  Left lower leg: No edema  Neurological:      Mental Status: She is alert  Mental status is at baseline     Psychiatric:         Mood and Affect: Mood normal          Behavior: Behavior normal

## 2022-06-23 NOTE — PATIENT INSTRUCTIONS
Lidocaine patch  Tramadol 1 tablet twice a day, okay to take a 3rd dose in mid-day if needed but try lidocaine patch First  Physical therapy  Return in 1 month  Hip x-rays

## 2022-06-27 ENCOUNTER — DOCUMENTATION (OUTPATIENT)
Dept: INTERNAL MEDICINE CLINIC | Facility: CLINIC | Age: 87
End: 2022-06-27

## 2022-06-27 ENCOUNTER — HOSPITAL ENCOUNTER (OUTPATIENT)
Dept: RADIOLOGY | Facility: HOSPITAL | Age: 87
Discharge: HOME | End: 2022-06-27
Payer: MEDICARE

## 2022-06-27 DIAGNOSIS — M54.16 LUMBAR RADICULOPATHY: Chronic | ICD-10-CM

## 2022-06-27 DIAGNOSIS — M25.551 BILATERAL HIP PAIN: ICD-10-CM

## 2022-06-27 DIAGNOSIS — M25.552 BILATERAL HIP PAIN: ICD-10-CM

## 2022-06-27 PROCEDURE — 73522 X-RAY EXAM HIPS BI 3-4 VIEWS: CPT

## 2022-06-27 NOTE — PROGRESS NOTES
[facility] [facility]  Albert Ya, PharmD, BCPS, BCACP     Communication with patient: per chart review     Reason for documentation: per PCP consult    Recommendations:     Lumbar radiculopathy: Tramadol increased by PCP from 25 mg BID PRN to 50 mg BID + 50 mg mid-day PRN on 6/23/22  Should reinforce to patient benefit of taking tramadol as pain prevention rather than using it to treat pain when it is already severe  Follow-up:   Follow-up with pharmacist PRN  Next PCP visit: 8/4/2022    Findings:     Patient seen in the ER on 6/20/22 due to complaint of L hip pain with radiation down L lower leg into foot  Worsening of long-standing condition, at least 3 years  No injury, no distress  She resides in independent living facility  Receives PT at facility  Ambulates with walker  She is prescribed tramadol PRN but does not take regularly  Pain improved significantly following administration of tramadol  XR lumber spine - Severe arthritis, advanced degenerative changes, no acute fractures  Increased risk of falls, but no recent falls reported  XR hips bilateral pending  Findings:      Lumbar radiculopathy: Patient with advanced arthritis  Tramadol is reasonable agent, given severity of disease  Preferred over chronic NSAID use  Dose/frequency is appropriate for patient's age  Max dose: 300 mg/day  Patient hesitant to take tramadol regularly and tends to take after pain is present  However, multiple studies comparing fixed-interval/time-scheduled vs  on-demand/pain/contingent strategies demonstrate that fixed interval approaches deliver improved outcomes including more stable opioid blood levels, better pain relief, fewer side effects, and lower addiction risk   Evidence shows that early intervention when the pain is not very intense, as well as anticipation of pain relief, contributes to the superiority of fixed time interval protocols over analgesia administration following on-demand  Note DDI between tramadol and Lexapro regarding increased risk for serotonin syndrome/serotonin toxicity  Reported cases of syndrome/serotonin toxicity have occurred following initiation of tramadol  Patient has been taking combination of tramadol + SSRI since 2018  Osteoporosis:   · No current or history of pathological fracture  High risk  · MEDICATIONS: Prolia 60 mg SQ every 6 mos  Next due in 9/2022  Parkinson's disease:  · Doing well with low-dose Sinemet 25/100 1 tab TID  Follows with Neurology  Depression with anxiety:  · MEDICATIONS: Lexapro 5 mg BID  Well controlled per patient and daughter  Continue as prescribed  Primary hypertension: BP goal: <140/90 mmHg based on AHA/ACC Blood Cholesterol Guidelines  - In-office BP below goal, HR acceptable   MEDICATIONS: diltiazem 30 mg BID  Continue as prescribed  Vitamin D deficiency:  · MEDICATIONS: Calcium/Vitamin D supplement daily  Continue as prescribed  · LABS: labs are reviewed, up to date and normal  Serum Vitamin D> 30       Reason For Outreach  Embedded Pharmacist    Demographics  Interaction Method: Chart Review (EMR)  Type of Intervention: New    Topic(s) Addressed  Opioids; CMR    Intervention(s) Made    Pharmacologic:    -- Medication reconciliation    Time Spent:     Time Spent in Care Coordination: 40 minutes    Recommendations  Recipient: Provider  Outcome: Education Provided

## 2022-07-01 ENCOUNTER — TELEPHONE (OUTPATIENT)
Dept: INTERNAL MEDICINE CLINIC | Facility: CLINIC | Age: 87
End: 2022-07-01

## 2022-07-01 NOTE — TELEPHONE ENCOUNTER
----- Message from Ulices Grover DO sent at 6/30/2022  9:09 PM EDT -----  X-ray is back & there is mild osteoarthritis of both hips   Please c/w physical therapy and medication for pain, thank you

## 2022-08-19 ENCOUNTER — OFFICE VISIT (OUTPATIENT)
Dept: INTERNAL MEDICINE CLINIC | Facility: CLINIC | Age: 87
End: 2022-08-19
Payer: MEDICARE

## 2022-08-19 VITALS
BODY MASS INDEX: 23.95 KG/M2 | RESPIRATION RATE: 14 BRPM | OXYGEN SATURATION: 96 % | SYSTOLIC BLOOD PRESSURE: 126 MMHG | DIASTOLIC BLOOD PRESSURE: 82 MMHG | HEART RATE: 64 BPM | WEIGHT: 149 LBS | HEIGHT: 66 IN

## 2022-08-19 DIAGNOSIS — M54.16 LUMBAR RADICULOPATHY: Chronic | ICD-10-CM

## 2022-08-19 DIAGNOSIS — G20 PARKINSON'S DISEASE (HCC): Chronic | ICD-10-CM

## 2022-08-19 DIAGNOSIS — F41.8 DEPRESSION WITH ANXIETY: Primary | ICD-10-CM

## 2022-08-19 DIAGNOSIS — I10 PRIMARY HYPERTENSION: ICD-10-CM

## 2022-08-19 PROCEDURE — 99214 OFFICE O/P EST MOD 30 MIN: CPT | Performed by: INTERNAL MEDICINE

## 2022-08-19 RX ORDER — ESCITALOPRAM OXALATE 10 MG/1
5 TABLET ORAL 2 TIMES DAILY
Qty: 90 TABLET | Refills: 3 | Status: SHIPPED | OUTPATIENT
Start: 2022-08-19 | End: 2022-10-12

## 2022-08-19 NOTE — PROGRESS NOTES
Assessment/Plan:     Diagnoses and all orders for this visit:    Depression with anxiety  Comments:  take lexapro but take 2nd dose at bedtime rather than in afternoon(may help drowsiness)  Orders:  -     escitalopram (LEXAPRO) 10 mg tablet; Take 0 5 tablets (5 mg total) by mouth 2 (two) times a day    Parkinson's disease ([facility] [address] )  Comments:  she declines to go back and see neurology  c/w sinemet at current dose for now    Primary hypertension  Comments:  BP doing well, c/w CCB  Orders:  -     diltiazem (CARDIZEM) 30 mg tablet; Take 1 tablet (30 mg total) by mouth 2 (two) times a day    Lumbar radiculopathy  Comments:  reduce tramadol to 1 tab in AM and 1/2 tab in PM for a trial   if pain remains stable, okay to try reducing further to 1/2 tab BID   f/u in october          Subjective:      Patient ID: Nick Henry is a 80 y o  female  HPI    Here for follow up, here with daughter during today's appt  Jamar Murphy has been feeling tired lately, like she wants to sleep  She is taking tramadol 1 tab BID-TID and with adequate relief of pain  She is willing to reduce this dose today  She is taking lexapro in the AM and in the afternoon  Her daughter is unsure if Jamar Murphy is taking diltiazem, but Jamar Murphy states she is  She declines to move to [facility], wants to stay in independent living and declines help with her medications as the home health team that would do this for her cost money which she cannot afford  ROS Otherwise negative, no other complaints      Past Medical History:   Diagnosis Date    Anxiety     Arthritis     Gastroparesis     History of atherosclerosis     History of breast cancer     History of osteoarthritis     Long term use of drug     Malignant neoplasm of breast ([facility] ) 04/11/2011    left w/mastectomy; age 80    Osteomalacia      Vitals:    08/19/22 1257   BP: 126/82   Pulse: 64   Resp: 14   SpO2: 96%   Weight: 67 6 kg (149 lb)   Height: 5' 6" (1 676 m)     Body mass index is 24 05 kg/m²  Current Outpatient Medications:     diltiazem (CARDIZEM) 30 mg tablet, Take 1 tablet (30 mg total) by mouth 2 (two) times a day, Disp: 120 tablet, Rfl: 3    escitalopram (LEXAPRO) 10 mg tablet, Take 0 5 tablets (5 mg total) by mouth 2 (two) times a day, Disp: 90 tablet, Rfl: 3    acetaminophen (TYLENOL) 325 mg tablet, Take 325 mg by mouth 2 (two) times a day before breakfast and lunch, Disp: , Rfl:     calcium citrate-vitamin D (CITRACAL+D) 315-200 MG-UNIT per tablet, Take 1 tablet by mouth daily, Disp: , Rfl:     carbidopa-levodopa (SINEMET)  mg per tablet, Take 1 tablet by mouth 3 (three) times a day, Disp: 270 tablet, Rfl: 3    chlorhexidine (PERIDEX) 0 12 % solution, Use as directed , Disp: , Rfl: 0    cholecalciferol (VITAMIN D3) 1,000 units tablet, Take 3,000 Units by mouth daily , Disp: , Rfl:     traMADol (ULTRAM) 50 mg tablet, Take one tablet in the morning and one in the evening  Okay to take 1 tablet mid-day for pain if needed, Disp: 75 tablet, Rfl: 0  No Known Allergies      Review of Systems   Constitutional: Positive for fatigue  Negative for fever  HENT: Negative for congestion  Eyes: Negative for visual disturbance  Respiratory: Negative for shortness of breath  Cardiovascular: Negative for chest pain  Gastrointestinal: Negative for abdominal pain  Endocrine: Negative for polyuria  Genitourinary: Negative for difficulty urinating  Musculoskeletal: Positive for arthralgias (but improved with tramadol)  Skin: Negative for rash  Allergic/Immunologic: Negative for immunocompromised state  Neurological: Positive for tremors  Psychiatric/Behavioral: Negative for dysphoric mood  Objective:      /82   Pulse 64   Resp 14   Ht 5' 6" (1 676 m)   Wt 67 6 kg (149 lb)   LMP  (LMP Unknown)   SpO2 96%   BMI 24 05 kg/m²          Physical Exam  Vitals reviewed  Constitutional:       General: She is not in acute distress (but appears tired)  Appearance: Normal appearance  HENT:      Head: Normocephalic and atraumatic  Cardiovascular:      Rate and Rhythm: Normal rate and regular rhythm  Heart sounds: No murmur heard  Pulmonary:      Effort: Pulmonary effort is normal       Breath sounds: No wheezing or rales  Abdominal:      General: Bowel sounds are normal       Palpations: Abdomen is soft  Tenderness: There is no abdominal tenderness  Musculoskeletal:      Right lower leg: No edema  Left lower leg: No edema  Neurological:      Mental Status: She is alert     Psychiatric:         Mood and Affect: Mood normal          Behavior: Behavior normal

## 2022-08-19 NOTE — PATIENT INSTRUCTIONS
Blood work end of September  Return 1st week of October  Lower tramadol to 1 tablet in the morning and 1/2 tablet in the evening   If pain is unchanged after 1 week, can try lowering dose to 1/2 tablet twice a day  Take lexapro 1/2 tablet in the morning and 1/2 tablet at bedtime

## 2022-09-26 DIAGNOSIS — G20 PARKINSON'S DISEASE (HCC): Chronic | ICD-10-CM

## 2022-09-27 ENCOUNTER — APPOINTMENT (OUTPATIENT)
Dept: LAB | Facility: CLINIC | Age: 87
End: 2022-09-27
Payer: MEDICARE

## 2022-09-27 DIAGNOSIS — E55.9 VITAMIN D DEFICIENCY: ICD-10-CM

## 2022-09-27 DIAGNOSIS — M54.16 LUMBAR RADICULOPATHY: Chronic | ICD-10-CM

## 2022-09-27 DIAGNOSIS — M81.0 AGE-RELATED OSTEOPOROSIS WITHOUT CURRENT PATHOLOGICAL FRACTURE: Chronic | ICD-10-CM

## 2022-09-27 DIAGNOSIS — F41.8 DEPRESSION WITH ANXIETY: Chronic | ICD-10-CM

## 2022-09-27 DIAGNOSIS — G20 PARKINSON'S DISEASE (HCC): Chronic | ICD-10-CM

## 2022-09-27 DIAGNOSIS — I10 PRIMARY HYPERTENSION: ICD-10-CM

## 2022-09-27 DIAGNOSIS — R17 ELEVATED BILIRUBIN: ICD-10-CM

## 2022-09-27 LAB
25(OH)D3 SERPL-MCNC: 34.1 NG/ML (ref 30–100)
ALBUMIN SERPL BCP-MCNC: 3.8 G/DL (ref 3.5–5)
ALP SERPL-CCNC: 46 U/L (ref 34–104)
ALT SERPL W P-5'-P-CCNC: <3 U/L (ref 7–52)
ANION GAP SERPL CALCULATED.3IONS-SCNC: 6 MMOL/L (ref 4–13)
AST SERPL W P-5'-P-CCNC: 12 U/L (ref 13–39)
BASOPHILS # BLD AUTO: 0.04 THOUSANDS/ΜL (ref 0–0.1)
BASOPHILS NFR BLD AUTO: 1 % (ref 0–1)
BILIRUB DIRECT SERPL-MCNC: 0.08 MG/DL (ref 0–0.2)
BILIRUB SERPL-MCNC: 0.72 MG/DL (ref 0.2–1)
BUN SERPL-MCNC: 20 MG/DL (ref 5–25)
CALCIUM SERPL-MCNC: 9 MG/DL (ref 8.4–10.2)
CHLORIDE SERPL-SCNC: 105 MMOL/L (ref 96–108)
CO2 SERPL-SCNC: 28 MMOL/L (ref 21–32)
CREAT SERPL-MCNC: 0.7 MG/DL (ref 0.6–1.3)
EOSINOPHIL # BLD AUTO: 0.17 THOUSAND/ΜL (ref 0–0.61)
EOSINOPHIL NFR BLD AUTO: 3 % (ref 0–6)
ERYTHROCYTE [DISTWIDTH] IN BLOOD BY AUTOMATED COUNT: 12.4 % (ref 11.6–15.1)
GFR SERPL CREATININE-BSD FRML MDRD: 72 ML/MIN/1.73SQ M
GLUCOSE P FAST SERPL-MCNC: 84 MG/DL (ref 65–99)
HCT VFR BLD AUTO: 44.3 % (ref 34.8–46.1)
HGB BLD-MCNC: 14.7 G/DL (ref 11.5–15.4)
IMM GRANULOCYTES # BLD AUTO: 0.03 THOUSAND/UL (ref 0–0.2)
IMM GRANULOCYTES NFR BLD AUTO: 0 % (ref 0–2)
LYMPHOCYTES # BLD AUTO: 1.78 THOUSANDS/ΜL (ref 0.6–4.47)
LYMPHOCYTES NFR BLD AUTO: 26 % (ref 14–44)
MAGNESIUM SERPL-MCNC: 1.9 MG/DL (ref 1.9–2.7)
MCH RBC QN AUTO: 32 PG (ref 26.8–34.3)
MCHC RBC AUTO-ENTMCNC: 33.2 G/DL (ref 31.4–37.4)
MCV RBC AUTO: 97 FL (ref 82–98)
MONOCYTES # BLD AUTO: 0.53 THOUSAND/ΜL (ref 0.17–1.22)
MONOCYTES NFR BLD AUTO: 8 % (ref 4–12)
NEUTROPHILS # BLD AUTO: 4.26 THOUSANDS/ΜL (ref 1.85–7.62)
NEUTS SEG NFR BLD AUTO: 62 % (ref 43–75)
NRBC BLD AUTO-RTO: 0 /100 WBCS
PLATELET # BLD AUTO: 261 THOUSANDS/UL (ref 149–390)
PMV BLD AUTO: 9.7 FL (ref 8.9–12.7)
POTASSIUM SERPL-SCNC: 4.1 MMOL/L (ref 3.5–5.3)
PROT SERPL-MCNC: 6.6 G/DL (ref 6.4–8.4)
RBC # BLD AUTO: 4.59 MILLION/UL (ref 3.81–5.12)
SODIUM SERPL-SCNC: 139 MMOL/L (ref 135–147)
WBC # BLD AUTO: 6.81 THOUSAND/UL (ref 4.31–10.16)

## 2022-09-27 PROCEDURE — 85025 COMPLETE CBC W/AUTO DIFF WBC: CPT

## 2022-09-27 PROCEDURE — 80053 COMPREHEN METABOLIC PANEL: CPT

## 2022-09-27 PROCEDURE — 36415 COLL VENOUS BLD VENIPUNCTURE: CPT

## 2022-09-27 PROCEDURE — 82248 BILIRUBIN DIRECT: CPT

## 2022-09-27 PROCEDURE — 82306 VITAMIN D 25 HYDROXY: CPT

## 2022-09-27 PROCEDURE — 83735 ASSAY OF MAGNESIUM: CPT

## 2022-09-29 ENCOUNTER — TELEPHONE (OUTPATIENT)
Dept: INTERNAL MEDICINE CLINIC | Facility: CLINIC | Age: 87
End: 2022-09-29

## 2022-09-29 NOTE — TELEPHONE ENCOUNTER
----- Message from Clif Parekh DO sent at 9/29/2022 11:10 AM EDT -----  (Please call daughter Nadya Torres): BW is back and looks good including vitamin D level and blood count  Results released to [facility]    Thank you

## 2022-10-03 DIAGNOSIS — M54.16 LUMBAR RADICULOPATHY: Chronic | ICD-10-CM

## 2022-10-03 RX ORDER — TRAMADOL HYDROCHLORIDE 50 MG/1
TABLET ORAL
Qty: 75 TABLET | Refills: 0 | Status: SHIPPED | OUTPATIENT
Start: 2022-10-03

## 2022-10-03 RX ORDER — LORAZEPAM 0.5 MG/1
TABLET ORAL
Qty: 60 TABLET | Refills: 0 | Status: CANCELLED | OUTPATIENT
Start: 2022-10-03

## 2022-10-03 NOTE — TELEPHONE ENCOUNTER
Please call pt's daughter    I re-ordered tramadol    I haven't ordered lorazepam for Brian Borne in the past?  She is taking lexapro for anxiety but shouldn't be taking lorazepam this interacts with tramadol    Let me know

## 2022-10-12 ENCOUNTER — OFFICE VISIT (OUTPATIENT)
Dept: INTERNAL MEDICINE CLINIC | Facility: CLINIC | Age: 87
End: 2022-10-12
Payer: MEDICARE

## 2022-10-12 VITALS
BODY MASS INDEX: 24.14 KG/M2 | SYSTOLIC BLOOD PRESSURE: 128 MMHG | DIASTOLIC BLOOD PRESSURE: 68 MMHG | HEART RATE: 71 BPM | RESPIRATION RATE: 16 BRPM | TEMPERATURE: 95.7 F | OXYGEN SATURATION: 99 % | HEIGHT: 66 IN | WEIGHT: 150.2 LBS

## 2022-10-12 DIAGNOSIS — M81.0 AGE-RELATED OSTEOPOROSIS WITHOUT CURRENT PATHOLOGICAL FRACTURE: Chronic | ICD-10-CM

## 2022-10-12 DIAGNOSIS — E55.9 VITAMIN D DEFICIENCY: ICD-10-CM

## 2022-10-12 DIAGNOSIS — M25.512 CHRONIC LEFT SHOULDER PAIN: ICD-10-CM

## 2022-10-12 DIAGNOSIS — F41.8 DEPRESSION WITH ANXIETY: Primary | Chronic | ICD-10-CM

## 2022-10-12 DIAGNOSIS — G89.29 CHRONIC LEFT SHOULDER PAIN: ICD-10-CM

## 2022-10-12 DIAGNOSIS — I10 PRIMARY HYPERTENSION: ICD-10-CM

## 2022-10-12 DIAGNOSIS — Z00.00 MEDICARE ANNUAL WELLNESS VISIT, SUBSEQUENT: ICD-10-CM

## 2022-10-12 PROCEDURE — G0439 PPPS, SUBSEQ VISIT: HCPCS | Performed by: INTERNAL MEDICINE

## 2022-10-12 PROCEDURE — 96372 THER/PROPH/DIAG INJ SC/IM: CPT | Performed by: INTERNAL MEDICINE

## 2022-10-12 PROCEDURE — 99214 OFFICE O/P EST MOD 30 MIN: CPT | Performed by: INTERNAL MEDICINE

## 2022-10-12 RX ORDER — ESCITALOPRAM OXALATE 10 MG/1
10 TABLET ORAL
Qty: 90 TABLET | Refills: 1 | Status: SHIPPED | COMMUNITY
Start: 2022-10-12

## 2022-10-12 NOTE — PATIENT INSTRUCTIONS
Switch lexapro to 1 tablet(10mg) at bedtime only  Prolia today  Flu shot in next couple of weeks    Medicare Preventive Visit Patient Instructions  Thank you for completing your Welcome to Medicare Visit or Medicare Annual Wellness Visit today  Your next wellness visit will be due in one year (10/13/2023)  The screening/preventive services that you may require over the next 5-10 years are detailed below  Some tests may not apply to you based off risk factors and/or age  Screening tests ordered at today's visit but not completed yet may show as past due  Also, please note that scanned in results may not display below  Preventive Screenings:  Service Recommendations Previous Testing/Comments   Colorectal Cancer Screening  * Colonoscopy    * Fecal Occult Blood Test (FOBT)/Fecal Immunochemical Test (FIT)  * Fecal DNA/Cologuard Test  * Flexible Sigmoidoscopy Age: 39-70 years old   Colonoscopy: every 10 years (may be performed more frequently if at higher risk)  OR  FOBT/FIT: every 1 year  OR  Cologuard: every 3 years  OR  Sigmoidoscopy: every 5 years  Screening may be recommended earlier than age 39 if at higher risk for colorectal cancer  Also, an individualized decision between you and your healthcare provider will decide whether screening between the ages of 74-80 would be appropriate  Colonoscopy: Not on file  FOBT/FIT: Not on file  Cologuard: Not on file  Sigmoidoscopy: Not on file    Screening Not Indicated     Breast Cancer Screening Age: 36 years old  Frequency: every 1-2 years  Not required if history of left and right mastectomy Mammogram: 01/29/2020    History Breast Cancer   Cervical Cancer Screening Between the ages of 21-29, pap smear recommended once every 3 years  Between the ages of 33-67, can perform pap smear with HPV co-testing every 5 years     Recommendations may differ for women with a history of total hysterectomy, cervical cancer, or abnormal pap smears in past  Pap Smear: Not on file    Screening Not Indicated   Hepatitis C Screening Once for adults born between 1945 and 1965  More frequently in patients at high risk for Hepatitis C Hep C Antibody: Not on file        Diabetes Screening 1-2 times per year if you're at risk for diabetes or have pre-diabetes Fasting glucose: 84 mg/dL (9/27/2022)  A1C: No results in last 5 years (No results in last 5 years)  Screening Current   Cholesterol Screening Once every 5 years if you don't have a lipid disorder  May order more often based on risk factors  Lipid panel: Not on file    Screening Not Indicated  History Lipid Disorder     Other Preventive Screenings Covered by Medicare:  Abdominal Aortic Aneurysm (AAA) Screening: covered once if your at risk  You're considered to be at risk if you have a family history of AAA  Lung Cancer Screening: covers low dose CT scan once per year if you meet all of the following conditions: (1) Age 50-69; (2) No signs or symptoms of lung cancer; (3) Current smoker or have quit smoking within the last 15 years; (4) You have a tobacco smoking history of at least 20 pack years (packs per day multiplied by number of years you smoked); (5) You get a written order from a healthcare provider  Glaucoma Screening: covered annually if you're considered high risk: (1) You have diabetes OR (2) Family history of glaucoma OR (3)  aged 48 and older OR (3)  American aged 72 and older  Osteoporosis Screening: covered every 2 years if you meet one of the following conditions: (1) You're estrogen deficient and at risk for osteoporosis based off medical history and other findings; (2) Have a vertebral abnormality; (3) On glucocorticoid therapy for more than 3 months; (4) Have primary hyperparathyroidism; (5) On osteoporosis medications and need to assess response to drug therapy  Last bone density test (DXA Scan): 11/09/2020  HIV Screening: covered annually if you're between the age of 12-76   Also covered annually if you are younger than 13 and older than 72 with risk factors for HIV infection  For pregnant patients, it is covered up to 3 times per pregnancy  Immunizations:  Immunization Recommendations   Influenza Vaccine Annual influenza vaccination during flu season is recommended for all persons aged >= 6 months who do not have contraindications   Pneumococcal Vaccine   * Pneumococcal conjugate vaccine = PCV13 (Prevnar 13), PCV15 (Vaxneuvance), PCV20 (Prevnar 20)  * Pneumococcal polysaccharide vaccine = PPSV23 (Pneumovax) Adults 25-60 years old: 1-3 doses may be recommended based on certain risk factors  Adults 72 years old: 1-2 doses may be recommended based off what pneumonia vaccine you previously received   Hepatitis B Vaccine 3 dose series if at intermediate or high risk (ex: diabetes, end stage renal disease, liver disease)   Tetanus (Td) Vaccine - COST NOT COVERED BY MEDICARE PART B Following completion of primary series, a booster dose should be given every 10 years to maintain immunity against tetanus  Td may also be given as tetanus wound prophylaxis  Tdap Vaccine - COST NOT COVERED BY MEDICARE PART B Recommended at least once for all adults  For pregnant patients, recommended with each pregnancy  Shingles Vaccine (Shingrix) - COST NOT COVERED BY MEDICARE PART B  2 shot series recommended in those aged 48 and above     Health Maintenance Due:  There are no preventive care reminders to display for this patient  Immunizations Due:      Topic Date Due    COVID-19 Vaccine (3 - Booster for Pfizer series) 07/12/2021    Influenza Vaccine (1) 09/01/2022     Advance Directives   What are advance directives? Advance directives are legal documents that state your wishes and plans for medical care  These plans are made ahead of time in case you lose your ability to make decisions for yourself   Advance directives can apply to any medical decision, such as the treatments you want, and if you want to donate organs  What are the types of advance directives? There are many types of advance directives, and each state has rules about how to use them  You may choose a combination of any of the following:  Living will: This is a written record of the treatment you want  You can also choose which treatments you do not want, which to limit, and which to stop at a certain time  This includes surgery, medicine, IV fluid, and tube feedings  [facility] power of  for [facility]): This is a written record that states who you want to make healthcare choices for you when you are unable to make them for yourself  This person, called a proxy, is usually a family member or a friend  You may choose more than 1 proxy  Do not resuscitate (DNR) order:  A DNR order is used in case your heart stops beating or you stop breathing  It is a request not to have certain forms of treatment, such as CPR  A DNR order may be included in other types of advance directives  Medical directive: This covers the care that you want if you are in a coma, near death, or unable to make decisions for yourself  You can list the treatments you want for each condition  Treatment may include pain medicine, surgery, blood transfusions, dialysis, IV or tube feedings, and a ventilator (breathing machine)  Values history: This document has questions about your views, beliefs, and how you feel and think about life  This information can help others choose the care that you would choose  Why are advance directives important? An advance directive helps you control your care  Although spoken wishes may be used, it is better to have your wishes written down  Spoken wishes can be misunderstood, or not followed  Treatments may be given even if you do not want them  An advance directive may make it easier for your family to make difficult choices about your care  Urinary Incontinence   Urinary incontinence (UI)  is when you lose control of your bladder  UI develops because your bladder cannot store or empty urine properly  The 3 most common types of UI are stress incontinence, urge incontinence, or both  Medicines:   May be given to help strengthen your bladder control  Report any side effects of medication to your healthcare provider  Do pelvic muscle exercises often:  Your pelvic muscles help you stop urinating  Squeeze these muscles tight for 5 seconds, then relax for 5 seconds  Gradually work up to squeezing for 10 seconds  Do 3 sets of 15 repetitions a day, or as directed  This will help strengthen your pelvic muscles and improve bladder control  Train your bladder:  Go to the bathroom at set times, such as every 2 hours, even if you do not feel the urge to go  You can also try to hold your urine when you feel the urge to go  For example, hold your urine for 5 minutes when you feel the urge to go  As that becomes easier, hold your urine for 10 minutes  Self-care:   Keep a UI record  Write down how often you leak urine and how much you leak  Make a note of what you were doing when you leaked urine  Drink liquids as directed  You may need to limit the amount of liquid you drink to help control your urine leakage  Do not drink any liquid right before you go to bed  Limit or do not have drinks that contain caffeine or alcohol  Prevent constipation  Eat a variety of high-fiber foods  Good examples are high-fiber cereals, beans, vegetables, and whole-grain breads  Walking is the best way to trigger your intestines to have a bowel movement  Exercise regularly and maintain a healthy weight  Weight loss and exercise will decrease pressure on your bladder and help you control your leakage  Use a catheter as directed  to help empty your bladder  A catheter is a tiny, plastic tube that is put into your bladder to drain your urine  Go to behavior therapy as directed  Behavior therapy may be used to help you learn to control your urge to urinate         © Copyright [employer] 2018 Information is for [employer] use only and may not be sold, redistributed or otherwise used for commercial purposes   All illustrations and images included in CareNotes® are the copyrighted property of A D A M , Inc  or [facility] Amarilis Patel

## 2022-10-12 NOTE — PROGRESS NOTES
Assessment and Plan:     Problem List Items Addressed This Visit     Depression with anxiety - Primary (Chronic)    Relevant Medications    escitalopram (LEXAPRO) 10 mg tablet    Other Relevant Orders    Comprehensive metabolic panel    CBC and differential    Osteoporosis (Chronic)    Relevant Medications    denosumab (PROLIA) subcutaneous injection 60 mg (Completed)    Other Relevant Orders    DXA bone density spine hip and pelvis    Magnesium    Vitamin D 25 hydroxy    Primary hypertension    Relevant Orders    Comprehensive metabolic panel    CBC and differential    Vitamin D deficiency    Relevant Orders    Vitamin D 25 hydroxy      Other Visit Diagnoses     Chronic left shoulder pain        suspect OA, check shoulder x-ray and diclofenac gel ordered for BID use on shoulder    Relevant Medications    Diclofenac Sodium (VOLTAREN) 1 %    Other Relevant Orders    XR shoulder 2+ vw left    Medicare annual wellness visit, subsequent               Preventive health issues were discussed with patient, and age appropriate screening tests were ordered as noted in patient's After Visit Summary  Personalized health advice and appropriate referrals for health education or preventive services given if needed, as noted in patient's After Visit Summary      Patient presents for a Medicare Wellness Visit       Patient Care Team:  Noelle Bruno DO as PCP - General (Internal Medicine)       Problem List:     Patient Active Problem List   Diagnosis   • Depression with anxiety   • Lumbar radiculopathy   • Parkinson's disease ([facility] [address] )   • Tremor   • Primary hypertension   • Hyperlipidemia   • Anemia   • Arthritis   • Atherosclerosis   • Dyspnea   • Esophageal reflux   • Fatigue   • Gastroparesis   • Hoarseness   • Osteoporosis   • Nontoxic single thyroid nodule   • Vitamin D deficiency   • Difficulty urinating   • REM behavioral disorder   • Other chest pain   • Left leg swelling   • Diarrhea      Past Medical and Surgical History: Past Medical History:   Diagnosis Date   • Anxiety    • Arthritis    • Gastroparesis    • History of atherosclerosis    • History of breast cancer    • History of osteoarthritis    • Long term use of drug    • Malignant neoplasm of breast ([facility] [address] ) 04/11/2011    left w/mastectomy; age 80   • Osteomalacia      Past Surgical History:   Procedure Laterality Date   • BREAST BIOPSY Left 04/11/2011    u/s core-positive   • HYSTERECTOMY  1974    age 52   • MASTECTOMY Left 05/04/2011    malignant      Family History:     Family History   Problem Relation Age of Onset   • Coronary artery disease Mother    • Coronary artery disease Family    • Hyperlipidemia Family    • Osteoarthritis Family    • Lung cancer Sister 48      Social History:     Social History     Socioeconomic History   • Marital status:      Spouse name: None   • Number of children: None   • Years of education: None   • Highest education level: None   Occupational History   • None   Tobacco Use   • Smoking status: Never Smoker   • Smokeless tobacco: Never Used   Vaping Use   • Vaping Use: Never used   Substance and Sexual Activity   • Alcohol use: Yes     Comment: Social drinker   • Drug use: No   • Sexual activity: Not Currently   Other Topics Concern   • None   Social History Narrative   • None     Social Determinants of Health     Financial Resource Strain: Low Risk    • Difficulty of Paying Living Expenses: Not hard at all   Food Insecurity: Not on file   Transportation Needs: No Transportation Needs   • Lack of Transportation (Medical): No   • Lack of Transportation (Non-Medical):  No   Physical Activity: Not on file   Stress: Not on file   Social Connections: Not on file   Intimate Partner Violence: Not on file   Housing Stability: Not on file      Medications and Allergies:     Current Outpatient Medications   Medication Sig Dispense Refill   • acetaminophen (TYLENOL) 325 mg tablet Take 325 mg by mouth 2 (two) times a day before breakfast and lunch     • calcium citrate-vitamin D (CITRACAL+D) 315-200 MG-UNIT per tablet Take 1 tablet by mouth daily     • carbidopa-levodopa (SINEMET)  mg per tablet Take 1 tablet by mouth 3 (three) times a day 270 tablet 3   • cholecalciferol (VITAMIN D3) 1,000 units tablet Take 3,000 Units by mouth daily      • Diclofenac Sodium (VOLTAREN) 1 % Apply 2 g topically every 12 (twelve) hours as needed (left shoulder pain/OA) 100 g 1   • diltiazem (CARDIZEM) 30 mg tablet Take 1 tablet (30 mg total) by mouth 2 (two) times a day 120 tablet 3   • escitalopram (LEXAPRO) 10 mg tablet Take 1 tablet (10 mg total) by mouth daily at bedtime 90 tablet 1   • traMADol (ULTRAM) 50 mg tablet Take one tablet in the morning and one in the evening  Okay to take 1 tablet mid-day for pain if needed 75 tablet 0   • chlorhexidine (PERIDEX) 0 12 % solution Use as directed   0     No current facility-administered medications for this visit  No Known Allergies   Immunizations:     Immunization History   Administered Date(s) Administered   • COVID-19 PFIZER VACCINE 0 3 ML IM 01/22/2021, 02/12/2021   • INFLUENZA 10/21/2014, 09/30/2015, 09/20/2016, 11/14/2017   • Influenza Quadrivalent Preservative Free 3 years and older IM 10/21/2014   • Influenza Split High Dose Preservative Free IM 09/30/2015, 09/20/2016, 11/14/2017, 10/11/2019   • Influenza, high dose seasonal 0 7 mL 10/20/2018, 09/11/2020, 10/13/2021   • Pneumococcal Conjugate 13-Valent 12/08/2015   • Pneumococcal Polysaccharide PPV23 11/22/2019   • Zoster 03/04/2013, 05/17/2018   • Zoster Vaccine Recombinant 05/17/2018, 07/24/2018      Health Maintenance: There are no preventive care reminders to display for this patient  Topic Date Due   • COVID-19 Vaccine (3 - Booster for Pfizer series) 07/12/2021   • Influenza Vaccine (1) 09/01/2022      Medicare Screening Tests and Risk Assessments:     Alejandro Aranda is here for her Subsequent Wellness visit       Health Risk Assessment:   Patient rates overall health as fair  Patient feels that their physical health rating is same  Patient is satisfied with their life  Eyesight was rated as much worse  Hearing was rated as same  Patient feels that their emotional and mental health rating is same  Patients states they are never, rarely angry  Patient states they are always unusually tired/fatigued  Pain experienced in the last 7 days has been some  Patient's pain rating has been 5/10  Patient states that she has experienced no weight loss or gain in last 6 months  Patient reports pain in her left shoulder     Depression Screening:   PHQ-9 Score: 8      Fall Risk Screening: In the past year, patient has experienced: no history of falling in past year      Urinary Incontinence Screening:   Patient has leaked urine accidently in the last six months  Sometimes     Home Safety:  Patient has trouble with stairs inside or outside of their home  Patient has working smoke alarms and has working carbon monoxide detector  Home safety hazards include: none  Nutrition:   Current diet is Other (please comment)  Patient is a vegetarian        Medications:   Patient is currently taking over-the-counter supplements  OTC medications include: see medication list  Patient is able to manage medications  Activities of Daily Living (ADLs)/Instrumental Activities of Daily Living (IADLs):   Walk and transfer into and out of bed and chair?: Yes  Dress and groom yourself?: Yes    Bathe or shower yourself?: Yes    Feed yourself?  Yes  Do your laundry/housekeeping?: No  Manage your money, pay your bills and track your expenses?: No  Make your own meals?: No    Do your own shopping?: No    Previous Hospitalizations:   Any hospitalizations or ED visits within the last 12 months?: Yes    How many hospitalizations have you had in the last year?: 1-2    Advance Care Planning:   Living will: Yes    Advanced directive: Yes      PREVENTIVE SCREENINGS      Cardiovascular Screening: General: Screening Not Indicated and History Lipid Disorder      Diabetes Screening:     General: Screening Current      Colorectal Cancer Screening:     General: Screening Not Indicated      Breast Cancer Screening:     General: History Breast Cancer and Screening Not Indicated      Cervical Cancer Screening:    General: Screening Not Indicated      Osteoporosis Screening:    General: Screening Not Indicated and History Osteoporosis      Abdominal Aortic Aneurysm (AAA) Screening:        General: Screening Not Indicated      Lung Cancer Screening:     General: Screening Not Indicated      Hepatitis C Screening:    General: Screening Not Indicated    Screening, Brief Intervention, and Referral to Treatment (SBIRT)    Screening  Typical number of drinks in a day: 0  Typical number of drinks in a week: 0  Interpretation: Low risk drinking behavior  Single Item Drug Screening:  How often have you used an illegal drug (including marijuana) or a prescription medication for non-medical reasons in the past year? never    Single Item Drug Screen Score: 0  Interpretation: Negative screen for possible drug use disorder    Other Counseling Topics:   Regular weightbearing exercise and calcium and vitamin D intake           /68 (BP Location: Left arm, Patient Position: Sitting, Cuff Size: Adult)   Pulse 71   Temp (!) 95 7 °F (35 4 °C) (Tympanic)   Resp 16   Ht 5' 6" (1 676 m)   Wt 68 1 kg (150 lb 3 2 oz)   LMP  (LMP Unknown)   SpO2 99%   BMI 24 24 kg/m²         Abdirahman Forrest DO

## 2022-10-12 NOTE — PROGRESS NOTES
Name: Nico Covarrubias      : 1925      MRN: [id number]  Encounter Provider: Danae Cox DO  Encounter Date: 10/12/2022   Encounter department: [place] [address]     1  Depression with anxiety  Comments:  doing better on lower dose of lexapro, will c/w this dose but make it 10mg at bedtime(instead of 5mg BID)  f/u in 6 mos or prn  Orders:  -     Comprehensive metabolic panel; Future; Expected date: 2023  -     CBC and differential; Future; Expected date: 2023    2  Age-related osteoporosis without current pathological fracture  Comments:  prolia administerd today, f/u in 6 mos for next dose  DEXA ordered to be completed but waiting 2-3 mos from now  Orders:  -     denosumab (PROLIA) subcutaneous injection 60 mg  -     DXA bone density spine hip and pelvis; Future; Expected date: 2022  -     Magnesium; Future; Expected date: 2023  -     Vitamin D 25 hydroxy; Future; Expected date: 2023    3  Primary hypertension  Comments:  BP doing well, c/w CCB  Orders:  -     Comprehensive metabolic panel; Future; Expected date: 2023  -     CBC and differential; Future; Expected date: 2023    4  Chronic left shoulder pain  Comments:  suspect OA, check shoulder x-ray and diclofenac gel ordered for BID use on shoulder  Orders:  -     XR shoulder 2+ vw left; Future; Expected date: 10/12/2022  -     Diclofenac Sodium (VOLTAREN) 1 %; Apply 2 g topically every 12 (twelve) hours as needed (left shoulder pain/OA)    5  Vitamin D deficiency  Comments:  taking vit D OTC & level is normal, c/w rx  Orders:  -     Vitamin D 25 hydroxy; Future; Expected date: 2023    6  Medicare annual wellness visit, subsequent           Subjective      HPI     Here for follow up, here with daughter(Jennifer) during today's appt  Ang Garzon is feeling a bit better, more awake since cutting down on lexapro dose to 5mg BID    Her back pain has been better as well/she hasn't needed to take tramadol   She would like to proceed with prolia injection today  She defers on flu vaccine today as her left shoulder has been bothering her  She has pain with movement and none at rest   No fall or injury, but likely has OA per Lorraine Pino  ROS otherwise negative, no other complaints  Review of Systems   Constitutional: Negative for fever  HENT: Negative for congestion  Eyes: Negative for visual disturbance  Respiratory: Negative for shortness of breath  Cardiovascular: Negative for chest pain  Gastrointestinal: Negative for abdominal pain  Endocrine: Negative for polyuria  Genitourinary: Negative for difficulty urinating  Musculoskeletal: Positive for arthralgias  Skin: Negative for rash  Allergic/Immunologic: Negative for immunocompromised state  Neurological: Negative for dizziness  Psychiatric/Behavioral: Negative for dysphoric mood and sleep disturbance  Current Outpatient Medications on File Prior to Visit   Medication Sig   • acetaminophen (TYLENOL) 325 mg tablet Take 325 mg by mouth 2 (two) times a day before breakfast and lunch   • calcium citrate-vitamin D (CITRACAL+D) 315-200 MG-UNIT per tablet Take 1 tablet by mouth daily   • carbidopa-levodopa (SINEMET)  mg per tablet Take 1 tablet by mouth 3 (three) times a day   • cholecalciferol (VITAMIN D3) 1,000 units tablet Take 3,000 Units by mouth daily    • diltiazem (CARDIZEM) 30 mg tablet Take 1 tablet (30 mg total) by mouth 2 (two) times a day   • escitalopram (LEXAPRO) 10 mg tablet Take 1 tablet (10 mg total) by mouth daily at bedtime   • traMADol (ULTRAM) 50 mg tablet Take one tablet in the morning and one in the evening    Okay to take 1 tablet mid-day for pain if needed   • [DISCONTINUED] escitalopram (LEXAPRO) 10 mg tablet Take 0 5 tablets (5 mg total) by mouth 2 (two) times a day   • chlorhexidine (PERIDEX) 0 12 % solution Use as directed        Objective     /68 (BP Location: Left arm, Patient Position: Sitting, Cuff Size: Adult)   Pulse 71   Temp (!) 95 7 °F (35 4 °C) (Tympanic)   Resp 16   Ht 5' 6" (1 676 m)   Wt 68 1 kg (150 lb 3 2 oz)   LMP  (LMP Unknown)   SpO2 99%   BMI 24 24 kg/m²     Physical Exam  Vitals reviewed  Exam conducted with a chaperone present (daughter(Jennifer))  Constitutional:       Appearance: Normal appearance  HENT:      Head: Normocephalic and atraumatic  Right Ear: Tympanic membrane normal       Left Ear: Tympanic membrane normal    Eyes:      Conjunctiva/sclera: Conjunctivae normal    Cardiovascular:      Rate and Rhythm: Normal rate and regular rhythm  Heart sounds:     No gallop  Pulmonary:      Effort: Pulmonary effort is normal       Breath sounds: No wheezing or rales  Abdominal:      General: Bowel sounds are normal       Palpations: Abdomen is soft  Tenderness: There is no abdominal tenderness  Musculoskeletal:      Left shoulder: No tenderness  Decreased range of motion (2nd to pain)  Right lower leg: No edema  Left lower leg: No edema  Neurological:      Mental Status: She is alert  Mental status is at baseline     Psychiatric:         Mood and Affect: Mood normal          Behavior: Behavior normal           Results for orders placed or performed in visit on 09/27/22   Comprehensive metabolic panel   Result Value Ref Range    Sodium 139 135 - 147 mmol/L    Potassium 4 1 3 5 - 5 3 mmol/L    Chloride 105 96 - 108 mmol/L    CO2 28 21 - 32 mmol/L    ANION GAP 6 4 - 13 mmol/L    BUN 20 5 - 25 mg/dL    Creatinine 0 70 0 60 - 1 30 mg/dL    Glucose, Fasting 84 65 - 99 mg/dL    Calcium 9 0 8 4 - 10 2 mg/dL    AST 12 (L) 13 - 39 U/L    ALT <3 (L) 7 - 52 U/L    Alkaline Phosphatase 46 34 - 104 U/L    Total Protein 6 6 6 4 - 8 4 g/dL    Albumin 3 8 3 5 - 5 0 g/dL    Total Bilirubin 0 72 0 20 - 1 00 mg/dL    eGFR 72 ml/min/1 73sq m   CBC and differential   Result Value Ref Range    WBC 6 81 4 31 - 10 16 Thousand/uL    RBC 4  59 3 81 - 5 12 Million/uL    Hemoglobin 14 7 11 5 - 15 4 g/dL    Hematocrit 44 3 34 8 - 46 1 %    MCV 97 82 - 98 fL    MCH 32 0 26 8 - 34 3 pg    MCHC 33 2 31 4 - 37 4 g/dL    RDW 12 4 11 6 - 15 1 %    MPV 9 7 8 9 - 12 7 fL    Platelets 202 293 - 270 Thousands/uL    nRBC 0 /100 WBCs    Neutrophils Relative 62 43 - 75 %    Immat GRANS % 0 0 - 2 %    Lymphocytes Relative 26 14 - 44 %    Monocytes Relative 8 4 - 12 %    Eosinophils Relative 3 0 - 6 %    Basophils Relative 1 0 - 1 %    Neutrophils Absolute 4 26 1 85 - 7 62 Thousands/µL    Immature Grans Absolute 0 03 0 00 - 0 20 Thousand/uL    Lymphocytes Absolute 1 78 0 60 - 4 47 Thousands/µL    Monocytes Absolute 0 53 0 17 - 1 22 Thousand/µL    Eosinophils Absolute 0 17 0 00 - 0 61 Thousand/µL    Basophils Absolute 0 04 0 00 - 0 10 Thousands/µL   Vitamin D 25 hydroxy   Result Value Ref Range    Vit D, 25-Hydroxy 34 1 30 0 - 100 0 ng/mL   Magnesium   Result Value Ref Range    Magnesium 1 9 1 9 - 2 7 mg/dL   Bilirubin, direct   Result Value Ref Range    Bilirubin, Direct 0 08 0 00 - 0 20 mg/dL         Abdirahman Forrest DO

## 2022-10-25 ENCOUNTER — TELEPHONE (OUTPATIENT)
Dept: INTERNAL MEDICINE CLINIC | Facility: CLINIC | Age: 87
End: 2022-10-25

## 2022-10-25 NOTE — TELEPHONE ENCOUNTER
PT WOULD LIKE TO GET NEW BRAS AND LEFT BREAST PROSTHETIC BUT THEY NEED A SCRIPT FAXED TO [facility]    PT'S IS OVER 8YEARS OLD     FAX# [phone]    WILL FAX SCRIPT AND LET [facility]IM[facility] KNOW WHEN IT'S BEEN DONE

## 2022-11-01 ENCOUNTER — HOSPITAL ENCOUNTER (OUTPATIENT)
Dept: RADIOLOGY | Facility: HOSPITAL | Age: 87
Discharge: HOME | End: 2022-11-01

## 2022-11-01 DIAGNOSIS — G89.29 CHRONIC LEFT SHOULDER PAIN: ICD-10-CM

## 2022-11-01 DIAGNOSIS — M25.512 CHRONIC LEFT SHOULDER PAIN: ICD-10-CM

## 2022-11-07 ENCOUNTER — TELEPHONE (OUTPATIENT)
Dept: INTERNAL MEDICINE CLINIC | Facility: CLINIC | Age: 87
End: 2022-11-07

## 2022-11-07 NOTE — TELEPHONE ENCOUNTER
SPOKE WITH [facility], GAVE RESULTS/MESSAGE     SHE SAID THAT SHE DOES NOT THINK MOM WILL WANT TO SEE THE ORTHO DOC BUT WILL CALL BACK IF SHE DOES

## 2022-11-07 NOTE — TELEPHONE ENCOUNTER
----- Message from Yaritza Don DO sent at 11/7/2022 10:26 AM EST -----  (Please call daughter/Jennifer): Shoulder x-ray is back, shows signs of severe osteoarthritis and a small bone chip fragment in shoulder joint  If the diclofenac gel has not been helping Bri's shoulder pain, recommend she see [facility] ortho    Pls enter referral for 'left shoulder osteoarthritis' if pt agrees, thx

## 2022-12-23 ENCOUNTER — TELEPHONE (OUTPATIENT)
Dept: INTERNAL MEDICINE CLINIC | Facility: CLINIC | Age: 87
End: 2022-12-23

## 2022-12-23 NOTE — TELEPHONE ENCOUNTER
I'm calling for Casper Gamez, a patient of Doctor Matheus Dye  She is having a pretty bad and a very uncomfortable rash on her back  This is new and she was wondering if anything to put on it or somebody has to see her  She has already called  You have the information  Please call back as soon as you can  Thanks

## 2022-12-23 NOTE — TELEPHONE ENCOUNTER
She can use OTC cream such as aquaphor for now but I need to see the rash to advise her on how to treat it    Can she do a video visit today?   If not, recommend she is seen over the long weekend at [facility]

## 2023-01-03 DIAGNOSIS — M54.16 LUMBAR RADICULOPATHY: Chronic | ICD-10-CM

## 2023-01-03 RX ORDER — TRAMADOL HYDROCHLORIDE 50 MG/1
TABLET ORAL
Qty: 75 TABLET | Refills: 0 | Status: SHIPPED | OUTPATIENT
Start: 2023-01-03

## 2023-03-04 DIAGNOSIS — M54.16 LUMBAR RADICULOPATHY: Chronic | ICD-10-CM

## 2023-03-06 RX ORDER — TRAMADOL HYDROCHLORIDE 50 MG/1
TABLET ORAL
Qty: 75 TABLET | Refills: 0 | Status: SHIPPED | OUTPATIENT
Start: 2023-03-06

## 2023-03-30 ENCOUNTER — HOSPITAL ENCOUNTER (OUTPATIENT)
Dept: RADIOLOGY | Age: 88
Discharge: HOME | End: 2023-03-30

## 2023-03-30 DIAGNOSIS — M81.0 AGE-RELATED OSTEOPOROSIS WITHOUT CURRENT PATHOLOGICAL FRACTURE: Chronic | ICD-10-CM

## 2023-04-03 ENCOUNTER — APPOINTMENT (OUTPATIENT)
Dept: LAB | Facility: CLINIC | Age: 88
End: 2023-04-03

## 2023-04-03 DIAGNOSIS — I10 PRIMARY HYPERTENSION: ICD-10-CM

## 2023-04-03 DIAGNOSIS — M81.0 AGE-RELATED OSTEOPOROSIS WITHOUT CURRENT PATHOLOGICAL FRACTURE: Chronic | ICD-10-CM

## 2023-04-03 DIAGNOSIS — E55.9 VITAMIN D DEFICIENCY: ICD-10-CM

## 2023-04-03 DIAGNOSIS — F41.8 DEPRESSION WITH ANXIETY: Chronic | ICD-10-CM

## 2023-04-03 LAB
25(OH)D3 SERPL-MCNC: 33.7 NG/ML (ref 30–100)
ALBUMIN SERPL BCP-MCNC: 3.9 G/DL (ref 3.5–5)
ALP SERPL-CCNC: 52 U/L (ref 34–104)
ALT SERPL W P-5'-P-CCNC: <3 U/L (ref 7–52)
ANION GAP SERPL CALCULATED.3IONS-SCNC: 6 MMOL/L (ref 4–13)
AST SERPL W P-5'-P-CCNC: 12 U/L (ref 13–39)
BASOPHILS # BLD AUTO: 0.05 THOUSANDS/ÂΜL (ref 0–0.1)
BASOPHILS NFR BLD AUTO: 1 % (ref 0–1)
BILIRUB SERPL-MCNC: 0.82 MG/DL (ref 0.2–1)
BUN SERPL-MCNC: 15 MG/DL (ref 5–25)
CALCIUM SERPL-MCNC: 9.3 MG/DL (ref 8.4–10.2)
CHLORIDE SERPL-SCNC: 104 MMOL/L (ref 96–108)
CO2 SERPL-SCNC: 28 MMOL/L (ref 21–32)
CREAT SERPL-MCNC: 0.76 MG/DL (ref 0.6–1.3)
EOSINOPHIL # BLD AUTO: 0.19 THOUSAND/ÂΜL (ref 0–0.61)
EOSINOPHIL NFR BLD AUTO: 3 % (ref 0–6)
ERYTHROCYTE [DISTWIDTH] IN BLOOD BY AUTOMATED COUNT: 12.5 % (ref 11.6–15.1)
GFR SERPL CREATININE-BSD FRML MDRD: 65 ML/MIN/1.73SQ M
GLUCOSE P FAST SERPL-MCNC: 73 MG/DL (ref 65–99)
HCT VFR BLD AUTO: 47.3 % (ref 34.8–46.1)
HGB BLD-MCNC: 15.1 G/DL (ref 11.5–15.4)
IMM GRANULOCYTES # BLD AUTO: 0.01 THOUSAND/UL (ref 0–0.2)
IMM GRANULOCYTES NFR BLD AUTO: 0 % (ref 0–2)
LYMPHOCYTES # BLD AUTO: 2.05 THOUSANDS/ÂΜL (ref 0.6–4.47)
LYMPHOCYTES NFR BLD AUTO: 27 % (ref 14–44)
MAGNESIUM SERPL-MCNC: 2.1 MG/DL (ref 1.9–2.7)
MCH RBC QN AUTO: 30.9 PG (ref 26.8–34.3)
MCHC RBC AUTO-ENTMCNC: 31.9 G/DL (ref 31.4–37.4)
MCV RBC AUTO: 97 FL (ref 82–98)
MONOCYTES # BLD AUTO: 0.65 THOUSAND/ÂΜL (ref 0.17–1.22)
MONOCYTES NFR BLD AUTO: 9 % (ref 4–12)
NEUTROPHILS # BLD AUTO: 4.6 THOUSANDS/ÂΜL (ref 1.85–7.62)
NEUTS SEG NFR BLD AUTO: 60 % (ref 43–75)
NRBC BLD AUTO-RTO: 0 /100 WBCS
PLATELET # BLD AUTO: 255 THOUSANDS/UL (ref 149–390)
PMV BLD AUTO: 10.1 FL (ref 8.9–12.7)
POTASSIUM SERPL-SCNC: 3.9 MMOL/L (ref 3.5–5.3)
PROT SERPL-MCNC: 7.1 G/DL (ref 6.4–8.4)
RBC # BLD AUTO: 4.89 MILLION/UL (ref 3.81–5.12)
SODIUM SERPL-SCNC: 138 MMOL/L (ref 135–147)
WBC # BLD AUTO: 7.55 THOUSAND/UL (ref 4.31–10.16)

## 2023-04-07 ENCOUNTER — TELEPHONE (OUTPATIENT)
Dept: INTERNAL MEDICINE CLINIC | Facility: CLINIC | Age: 88
End: 2023-04-07

## 2023-04-07 NOTE — TELEPHONE ENCOUNTER
----- Message from Porfirio Lux DO sent at 4/7/2023  1:59 PM EDT -----  (Please call Daughter, Noam Werner): BW is back and looks good, results released to [facility]  Bone density results are back and have improved as well  Okay to proceed with prolia as scheduled on 4/17/22    Thank you

## 2023-04-25 ENCOUNTER — OFFICE VISIT (OUTPATIENT)
Dept: OBGYN CLINIC | Facility: OTHER | Age: 88
End: 2023-04-25

## 2023-04-25 VITALS
DIASTOLIC BLOOD PRESSURE: 78 MMHG | WEIGHT: 141 LBS | BODY MASS INDEX: 22.66 KG/M2 | HEIGHT: 66 IN | HEART RATE: 76 BPM | SYSTOLIC BLOOD PRESSURE: 156 MMHG

## 2023-04-25 DIAGNOSIS — M19.012 PRIMARY OSTEOARTHRITIS OF LEFT SHOULDER: Primary | ICD-10-CM

## 2023-04-25 DIAGNOSIS — M19.012 PRIMARY OSTEOARTHRITIS OF LEFT SHOULDER: ICD-10-CM

## 2023-04-25 RX ORDER — BETAMETHASONE SODIUM PHOSPHATE AND BETAMETHASONE ACETATE 3; 3 MG/ML; MG/ML
6 INJECTION, SUSPENSION INTRA-ARTICULAR; INTRALESIONAL; INTRAMUSCULAR; SOFT TISSUE
Status: COMPLETED | OUTPATIENT
Start: 2023-04-25 | End: 2023-04-25

## 2023-04-25 RX ORDER — BUPIVACAINE HYDROCHLORIDE 2.5 MG/ML
2 INJECTION, SOLUTION INFILTRATION; PERINEURAL
Status: COMPLETED | OUTPATIENT
Start: 2023-04-25 | End: 2023-04-25

## 2023-04-25 RX ADMIN — BETAMETHASONE SODIUM PHOSPHATE AND BETAMETHASONE ACETATE 6 MG: 3; 3 INJECTION, SUSPENSION INTRA-ARTICULAR; INTRALESIONAL; INTRAMUSCULAR; SOFT TISSUE at 15:05

## 2023-04-25 RX ADMIN — BUPIVACAINE HYDROCHLORIDE 2 ML: 2.5 INJECTION, SOLUTION INFILTRATION; PERINEURAL at 15:05

## 2023-04-25 NOTE — PROGRESS NOTES
Assessment  Diagnoses and all orders for this visit:    Primary osteoarthritis of left shoulder  -     Large joint arthrocentesis: L glenohumeral        Discussion and Plan:    · The patient has severe osteoarthritis of the left shoulder and treatment options were discussed  After discussing treatment options the patient elected for and received an intra-articular injection of corticosteroid for symptomatically relief  Further treatment options including surgical options which would involve  shoulder arthroplasty were discussed for completeness but hopefully we'll be able to control the patient's symptoms with nonoperative measures and avoid surgical intervention  · An intra-articular steroid injection was administered to the left shoulder, and the patient tolerated the procedure well  Post-injection instructions reviewed  · Patient declined referral to PT at this time  · Advised her to discussed breast pain with her PCP to coordinate further treatment if necessary  It is not typical for glenohumeral osteoarthritis to cause pain in the chest wall nor should it cause pain in the fingers or radiating down the medial aspect of the arm, of course the response to the injection will help identify which symptoms are related to the glenohumeral arthritis  · Surgical treatment in a patient of the age of 80 is unlikely to be a reasonable approach and therefore we will focus further treatment towards repeat injections and physical therapy    Subjective:   Patient ID: Lalo Yen is a 80 y o  female      The patient presents with a chief complaint of right shoulder pain  The pain began 5 month(s) ago and is not associated with an acute injury  The patient describes the pain as sharp in intensity,  intermittent in timing, and localizes the pain to the left anterior and axillary-region pain radiating to the fingers   She also complains of pain in the left breast area that started around the same time, and she "reports this breat was removed about 7 years ago  The pain is worse with most movements and relieved by Tramadol and Tylenol  The pain is not associated with numbness and tingling  She does report clicking of the shoulder with motion  The patient is awoken at night by the pain  The patient has had not treatment including PT or injections  She reports a surgery in her 45s, but does not recall what type of procedure  She is accompanied by her daughter  The following portions of the patient's history were reviewed and updated as appropriate: allergies, current medications, past family history, past medical history, past social history, past surgical history and problem list     Review of Systems   Constitutional: Negative for chills and fever  HENT: Negative for ear pain and sore throat  Eyes: Negative for pain and visual disturbance  Respiratory: Negative for cough and shortness of breath  Cardiovascular: Negative for chest pain and palpitations  Gastrointestinal: Negative for abdominal pain and vomiting  Genitourinary: Negative for dysuria and hematuria  Musculoskeletal: Positive for arthralgias and myalgias  Negative for back pain  See ortho exam   Skin: Negative for color change and rash  Neurological: Negative for seizures and syncope  All other systems reviewed and are negative  Objective:  /78   Pulse 76   Ht 5' 6\" (1 676 m)   Wt 64 kg (141 lb)   LMP  (LMP Unknown)   BMI 22 76 kg/m²       Left Shoulder Exam     Tenderness   The patient is experiencing tenderness in the acromioclavicular joint and biceps tendon  Range of Motion   External rotation: 10   Forward flexion: 90   Internal rotation 0 degrees: Lumbar     Other   Erythema: absent  Scars: present  Sensation: normal  Pulse: present     Comments:  Decreased range of motion and strength of left shoulder on exam            Physical Exam  Vitals reviewed     HENT:      Head: Normocephalic and " atraumatic  Eyes:      General:         Right eye: No discharge  Left eye: No discharge  Extraocular Movements: Extraocular movements intact  Conjunctiva/sclera: Conjunctivae normal    Cardiovascular:      Rate and Rhythm: Normal rate  Pulmonary:      Effort: Pulmonary effort is normal  No respiratory distress  Musculoskeletal:         General: Tenderness present  No signs of injury  Cervical back: Normal range of motion and neck supple  Skin:     General: Skin is warm and dry  Neurological:      Mental Status: She is alert and oriented to person, place, and time  I have personally reviewed pertinent films in PACS and my interpretation is as follows  X-rays of the left shoulder obtained on 11/1/2022 were reviewed, which reveal severe shoulder osteoarthritis  Large joint arthrocentesis: L glenohumeral  Universal Protocol:  Consent: Verbal consent obtained  Written consent not obtained    Risks and benefits: risks, benefits and alternatives were discussed  Consent given by: patient  Patient understanding: patient states understanding of the procedure being performed  Patient identity confirmed: verbally with patient    Supporting Documentation  Indications: pain   Procedure Details  Location: shoulder - L glenohumeral  Needle size: 22 G  Ultrasound guidance: no  Approach: posterolateral  Medications administered: 2 mL bupivacaine 0 25 %; 6 mg betamethasone acetate-betamethasone sodium phosphate 6 (3-3) mg/mL    Patient tolerance: patient tolerated the procedure well with no immediate complications  Dressing:  Sterile dressing applied          Scribe Attestation    I,:  Chilo Burns am acting as a scribe while in the presence of the attending physician :       I,:  Tracey Moeller MD personally performed the services described in this documentation    as scribed in my presence :

## 2023-04-25 NOTE — PATIENT INSTRUCTIONS

## 2023-05-07 ENCOUNTER — HOSPITAL ENCOUNTER (INPATIENT)
Facility: HOSPITAL | Age: 88
LOS: 1 days | Discharge: HOME | End: 2023-05-08
Attending: EMERGENCY MEDICINE | Admitting: INTERNAL MEDICINE

## 2023-05-07 ENCOUNTER — APPOINTMENT (EMERGENCY)
Dept: RADIOLOGY | Facility: HOSPITAL | Age: 88
End: 2023-05-07

## 2023-05-07 DIAGNOSIS — U07.1 COVID-19: Primary | ICD-10-CM

## 2023-05-07 DIAGNOSIS — U07.1 COVID-19: ICD-10-CM

## 2023-05-07 LAB
2HR DELTA HS TROPONIN: -1 NG/L
ALBUMIN SERPL BCP-MCNC: 3.6 G/DL (ref 3.5–5)
ALP SERPL-CCNC: 47 U/L (ref 34–104)
ALT SERPL W P-5'-P-CCNC: <3 U/L (ref 7–52)
ANION GAP SERPL CALCULATED.3IONS-SCNC: 6 MMOL/L (ref 4–13)
AST SERPL W P-5'-P-CCNC: 10 U/L (ref 13–39)
ATRIAL RATE: 89 BPM
BASOPHILS # BLD AUTO: 0.02 THOUSANDS/ÂΜL (ref 0–0.1)
BASOPHILS NFR BLD AUTO: 0 % (ref 0–1)
BILIRUB SERPL-MCNC: 0.73 MG/DL (ref 0.2–1)
BUN SERPL-MCNC: 15 MG/DL (ref 5–25)
CALCIUM SERPL-MCNC: 8.8 MG/DL (ref 8.4–10.2)
CARDIAC TROPONIN I PNL SERPL HS: 3 NG/L
CARDIAC TROPONIN I PNL SERPL HS: 4 NG/L
CHLORIDE SERPL-SCNC: 105 MMOL/L (ref 96–108)
CO2 SERPL-SCNC: 26 MMOL/L (ref 21–32)
CREAT SERPL-MCNC: 0.73 MG/DL (ref 0.6–1.3)
EOSINOPHIL # BLD AUTO: 0.03 THOUSAND/ÂΜL (ref 0–0.61)
EOSINOPHIL NFR BLD AUTO: 0 % (ref 0–6)
ERYTHROCYTE [DISTWIDTH] IN BLOOD BY AUTOMATED COUNT: 12.9 % (ref 11.6–15.1)
FLUAV RNA RESP QL NAA+PROBE: NEGATIVE
FLUBV RNA RESP QL NAA+PROBE: NEGATIVE
GFR SERPL CREATININE-BSD FRML MDRD: 69 ML/MIN/1.73SQ M
GLUCOSE SERPL-MCNC: 95 MG/DL (ref 65–140)
HCT VFR BLD AUTO: 42.7 % (ref 34.8–46.1)
HGB BLD-MCNC: 14 G/DL (ref 11.5–15.4)
IMM GRANULOCYTES # BLD AUTO: 0.03 THOUSAND/UL (ref 0–0.2)
IMM GRANULOCYTES NFR BLD AUTO: 0 % (ref 0–2)
LYMPHOCYTES # BLD AUTO: 1.03 THOUSANDS/ÂΜL (ref 0.6–4.47)
LYMPHOCYTES NFR BLD AUTO: 15 % (ref 14–44)
MAGNESIUM SERPL-MCNC: 2 MG/DL (ref 1.9–2.7)
MCH RBC QN AUTO: 31.2 PG (ref 26.8–34.3)
MCHC RBC AUTO-ENTMCNC: 32.8 G/DL (ref 31.4–37.4)
MCV RBC AUTO: 95 FL (ref 82–98)
MONOCYTES # BLD AUTO: 1.05 THOUSAND/ÂΜL (ref 0.17–1.22)
MONOCYTES NFR BLD AUTO: 15 % (ref 4–12)
NEUTROPHILS # BLD AUTO: 4.75 THOUSANDS/ÂΜL (ref 1.85–7.62)
NEUTS SEG NFR BLD AUTO: 70 % (ref 43–75)
NRBC BLD AUTO-RTO: 0 /100 WBCS
P AXIS: 30 DEGREES
PLATELET # BLD AUTO: 168 THOUSANDS/UL (ref 149–390)
PMV BLD AUTO: 9.7 FL (ref 8.9–12.7)
POTASSIUM SERPL-SCNC: 4.3 MMOL/L (ref 3.5–5.3)
PR INTERVAL: 158 MS
PROT SERPL-MCNC: 6.5 G/DL (ref 6.4–8.4)
QRS AXIS: -1 DEGREES
QRSD INTERVAL: 86 MS
QT INTERVAL: 352 MS
QTC INTERVAL: 428 MS
RBC # BLD AUTO: 4.49 MILLION/UL (ref 3.81–5.12)
RSV RNA RESP QL NAA+PROBE: NEGATIVE
SARS-COV-2 RNA RESP QL NAA+PROBE: POSITIVE
SODIUM SERPL-SCNC: 137 MMOL/L (ref 135–147)
T WAVE AXIS: 75 DEGREES
TSH SERPL DL<=0.05 MIU/L-ACNC: 0.67 UIU/ML (ref 0.45–4.5)
VENTRICULAR RATE: 89 BPM
WBC # BLD AUTO: 6.91 THOUSAND/UL (ref 4.31–10.16)

## 2023-05-07 PROCEDURE — XW033E5 INTRODUCTION OF REMDESIVIR ANTI-INFECTIVE INTO PERIPHERAL VEIN, PERCUTANEOUS APPROACH, NEW TECHNOLOGY GROUP 5: ICD-10-PCS | Performed by: INTERNAL MEDICINE

## 2023-05-07 RX ORDER — TRAMADOL HYDROCHLORIDE 50 MG/1
50 TABLET ORAL EVERY 8 HOURS PRN
Status: DISCONTINUED | OUTPATIENT
Start: 2023-05-07 | End: 2023-05-08 | Stop reason: HOSPADM

## 2023-05-07 RX ORDER — ESCITALOPRAM OXALATE 10 MG/1
10 TABLET ORAL
Status: DISCONTINUED | OUTPATIENT
Start: 2023-05-07 | End: 2023-05-08 | Stop reason: HOSPADM

## 2023-05-07 RX ORDER — GUAIFENESIN/DEXTROMETHORPHAN 100-10MG/5
10 SYRUP ORAL EVERY 4 HOURS PRN
Status: DISCONTINUED | OUTPATIENT
Start: 2023-05-07 | End: 2023-05-08 | Stop reason: HOSPADM

## 2023-05-07 RX ORDER — ENOXAPARIN SODIUM 100 MG/ML
30 INJECTION SUBCUTANEOUS EVERY 12 HOURS SCHEDULED
Status: DISCONTINUED | OUTPATIENT
Start: 2023-05-07 | End: 2023-05-08 | Stop reason: HOSPADM

## 2023-05-07 RX ORDER — IBUPROFEN 600 MG/1
600 TABLET ORAL ONCE
Status: COMPLETED | OUTPATIENT
Start: 2023-05-07 | End: 2023-05-07

## 2023-05-07 RX ORDER — ACETAMINOPHEN 325 MG/1
975 TABLET ORAL EVERY 8 HOURS SCHEDULED
Status: DISCONTINUED | OUTPATIENT
Start: 2023-05-07 | End: 2023-05-08 | Stop reason: HOSPADM

## 2023-05-07 RX ORDER — MELATONIN
3000 DAILY
Status: DISCONTINUED | OUTPATIENT
Start: 2023-05-07 | End: 2023-05-08 | Stop reason: HOSPADM

## 2023-05-07 RX ORDER — ACETAMINOPHEN 325 MG/1
650 TABLET ORAL ONCE
Status: COMPLETED | OUTPATIENT
Start: 2023-05-07 | End: 2023-05-07

## 2023-05-07 RX ADMIN — ACETAMINOPHEN 975 MG: 325 TABLET ORAL at 20:11

## 2023-05-07 RX ADMIN — CARBIDOPA AND LEVODOPA 1 TABLET: 25; 100 TABLET ORAL at 16:04

## 2023-05-07 RX ADMIN — CARBIDOPA AND LEVODOPA 1 TABLET: 25; 100 TABLET ORAL at 20:10

## 2023-05-07 RX ADMIN — ENOXAPARIN SODIUM 30 MG: 30 INJECTION SUBCUTANEOUS at 20:10

## 2023-05-07 RX ADMIN — Medication 3000 UNITS: at 15:59

## 2023-05-07 RX ADMIN — ACETAMINOPHEN 650 MG: 325 TABLET ORAL at 13:50

## 2023-05-07 RX ADMIN — IBUPROFEN 600 MG: 600 TABLET, FILM COATED ORAL at 13:50

## 2023-05-07 RX ADMIN — REMDESIVIR 200 MG: 100 INJECTION, POWDER, LYOPHILIZED, FOR SOLUTION INTRAVENOUS at 16:04

## 2023-05-07 RX ADMIN — TRAMADOL HYDROCHLORIDE 50 MG: 50 TABLET, COATED ORAL at 20:10

## 2023-05-07 RX ADMIN — DILTIAZEM HYDROCHLORIDE 30 MG: 30 TABLET ORAL at 20:10

## 2023-05-07 RX ADMIN — GUAIFENESIN AND DEXTROMETHORPHAN 10 ML: 100; 10 SYRUP ORAL at 20:10

## 2023-05-07 RX ADMIN — SODIUM CHLORIDE 500 ML: 0.9 INJECTION, SOLUTION INTRAVENOUS at 11:33

## 2023-05-07 NOTE — H&P
[facility]  H&P  Name: Karina Cooper 80 y o  female I MRN: [id number]  Unit/Bed#: ED-29 I Date of Admission: 5/7/2023   Date of Service: 5/7/2023 I Hospital Day: 0      Assessment/Plan   * generalized weakness due to COVID-19  Assessment & Plan  · Patient was brought in from independent living with generalized weakness, sore throat, cough, fever  · tested positive for COVID-19  · Chest x-ray clear  · Initiate remdesivir based on risk factors  · Monitor O2 sats  · PT OT  · Symptomatic treatment    Parkinson's disease ([facility] )  Assessment & Plan  · Continue Sinemet    Primary hypertension  Assessment & Plan  · Continue cardizem    Breast cancer ([facility] )  Assessment & Plan  · History in 2011       VTE Pharmacologic Prophylaxis: VTE Score: 4  lovenox  Code Status: Level 3 - DNAR and DNI per patient  Discussion with family:     Anticipated Length of Stay: Patient will be admitted on an inpatient basis with an anticipated length of stay of greater than 2 midnights secondary to weakness  Total Time Spent on Date of Encounter in care of patient: 55 minutes This time was spent on one or more of the following: performing physical exam; counseling and coordination of care; obtaining or reviewing history; documenting in the medical record; reviewing/ordering tests, medications or procedures; communicating with other healthcare professionals and discussing with patient's family/caregivers  Chief Complaint: so weak    History of Present Illness:  Karina Cooper is a 80 y o  female with a PMH of Parkinson's disease who presents with severe generalized weakness such that she was unable to get out of bed today  She states that every time she tried to sit up she would fall back down into bed and could not even lift her legs up  Associated with this, she had a fever of 102, cough, and new sore throat  She denies any headache, myalgias or GI symptoms  She denies any ill contacts      Review of Systems:  Review of Systems   Constitutional: Positive for activity change, fatigue and fever  Negative for appetite change (Patient states she has been drinking fine), chills, diaphoresis and unexpected weight change  HENT: Positive for congestion, drooling, rhinorrhea and sore throat  Negative for trouble swallowing  Eyes: Negative for visual disturbance  Respiratory: Positive for cough  Negative for choking, chest tightness, shortness of breath and wheezing  Cardiovascular: Negative for chest pain, palpitations and leg swelling  Gastrointestinal: Negative for abdominal distention, abdominal pain, anal bleeding, constipation, diarrhea, nausea and vomiting  Genitourinary: Negative for decreased urine volume, difficulty urinating, dysuria and hematuria  Musculoskeletal: Positive for arthralgias  Negative for back pain, gait problem, joint swelling, myalgias and neck pain  Skin: Negative for color change, pallor, rash and wound  Neurological: Positive for tremors (Occasionally) and weakness  Negative for dizziness, seizures, syncope, facial asymmetry, speech difficulty, light-headedness, numbness and headaches  Slow movements   Psychiatric/Behavioral: Negative for agitation, behavioral problems and confusion  Past Medical and Surgical History:   Past Medical History:   Diagnosis Date   • Anxiety    • Arthritis    • Gastroparesis    • History of atherosclerosis    • History of breast cancer    • History of osteoarthritis    • Long term use of drug    • Malignant neoplasm of breast ([facility] [address] ) 04/11/2011    left w/mastectomy; age 80   • Osteomalacia        Past Surgical History:   Procedure Laterality Date   • BREAST BIOPSY Left 04/11/2011    u/s core-positive   • HYSTERECTOMY  1974    age 52   • MASTECTOMY Left 05/04/2011    malignant       Meds/Allergies:  Prior to Admission medications    Medication Sig Start Date End Date Taking?  Authorizing Provider   acetaminophen (TYLENOL) 325 mg tablet Take 325 mg by mouth 2 (two) times a day before breakfast and lunch    Historical Provider, MD   calcium citrate-vitamin D (CITRACAL+D) 315-200 MG-UNIT per tablet Take 1 tablet by mouth daily    Historical Provider, MD   carbidopa-levodopa (SINEMET)  mg per tablet Take 1 tablet by mouth 3 (three) times a day 9/26/22   Abdirahman Forrest DO   chlorhexidine (PERIDEX) 0 12 % solution Use as directed  3/6/19   Historical Provider, MD   cholecalciferol (VITAMIN D3) 1,000 units tablet Take 3,000 Units by mouth daily     Historical Provider, MD   Diclofenac Sodium (VOLTAREN) 1 % Apply 2 g topically every 12 (twelve) hours as needed (left shoulder pain/OA) 10/12/22   Abdirahman Forrest DO   diltiazem (CARDIZEM) 30 mg tablet Take 1 tablet (30 mg total) by mouth 2 (two) times a day 8/19/22   Abdirahman Forrest DO   escitalopram (LEXAPRO) 10 mg tablet Take 1 tablet (10 mg total) by mouth daily at bedtime 10/12/22   Abdirahman Forrest DO   traMADol (ULTRAM) 50 mg tablet TAKE 1 TABLET BY MOUTH EVERY MORNING TAKE 1 TABLET BY MOUTH EVERY EVENING OKAY TO TAKE 1 TABLET MID-DAY FOR PAIN IF NEEDED 4/17/23   Abdirahman Forrest DO     I have reviewed home medications using recent Epic encounter  Allergies: No Known Allergies    Social History:  Marital Status:     Occupation:   Patient Pre-hospital Living Situation: [facility] independent living  Patient Pre-hospital Level of Mobility:   Patient Pre-hospital Diet Restrictions: none  Substance Use History:   Social History     Substance and Sexual Activity   Alcohol Use Yes    Comment: Social drinker     Social History     Tobacco Use   Smoking Status Never   Smokeless Tobacco Never     Social History     Substance and Sexual Activity   Drug Use No       Family History:  noncontributory    Physical Exam:     Vitals:   Blood Pressure: 150/66 (05/07/23 1400)  Pulse: 76 (05/07/23 1400)  Temperature: 98 2 °F (36 8 °C) (05/07/23 1046)  Temp Source: Oral (05/07/23 1046)  Respirations: 18 (05/07/23 1400)  Weight - Scale: 64 kg (141 lb 1 5 oz) (05/07/23 1042)  SpO2: 94 % (05/07/23 1400)    Physical Exam  Constitutional:       General: She is not in acute distress  Appearance: Normal appearance  She is not ill-appearing, toxic-appearing or diaphoretic  Comments: Appears fatigued   HENT:      Nose: Congestion present  Mouth/Throat:      Pharynx: Oropharynx is clear  No oropharyngeal exudate  Comments: Mild pharyngeal erythema  Eyes:      General: No scleral icterus  Right eye: No discharge  Left eye: No discharge  Conjunctiva/sclera: Conjunctivae normal    Cardiovascular:      Rate and Rhythm: Normal rate and regular rhythm  Heart sounds: No murmur heard  Pulmonary:      Effort: Pulmonary effort is normal  No respiratory distress  Breath sounds: Normal breath sounds  No stridor  No wheezing, rhonchi or rales  Abdominal:      General: Bowel sounds are normal  There is no distension  Palpations: Abdomen is soft  Tenderness: There is no abdominal tenderness  There is no guarding  Musculoskeletal:      Right lower leg: No edema  Left lower leg: No edema  Skin:     General: Skin is warm and dry  Coloration: Skin is not jaundiced or pale  Findings: No bruising, erythema, lesion or rash  Neurological:      Mental Status: She is alert  Comments: Awake alert interactive good historian with no confusion  Follows commands appropriately  No tremor or masked facies   Psychiatric:         Mood and Affect: Mood normal          Thought Content:  Thought content normal         Additional Data:     Lab Results:  Results from last 7 days   Lab Units 05/07/23  1133   WBC Thousand/uL 6 91   HEMOGLOBIN g/dL 14 0   HEMATOCRIT % 42 7   PLATELETS Thousands/uL 168   NEUTROS PCT % 70   LYMPHS PCT % 15   MONOS PCT % 15*   EOS PCT % 0     Results from last 7 days   Lab Units 05/07/23  1133   SODIUM mmol/L 137   POTASSIUM mmol/L 4 3   CHLORIDE mmol/L 105 CO2 mmol/L 26   BUN mg/dL 15   CREATININE mg/dL 0 73   ANION GAP mmol/L 6   CALCIUM mg/dL 8 8   ALBUMIN g/dL 3 6   TOTAL BILIRUBIN mg/dL 0 73   ALK PHOS U/L 47   ALT U/L <3*   AST U/L 10*   GLUCOSE RANDOM mg/dL 95                       Lines/Drains:  Invasive Devices     Peripheral Intravenous Line  Duration           Peripheral IV 05/07/23 Right Forearm <1 day              Imaging: cxr  XR chest 1 view portable   Final Result by Percy Brody MD (05/07 1208)      No acute cardiopulmonary disease  Workstation performed: [id number]             EKG and Other Studies Reviewed on Admission:   · EKG: nsr    ** Please Note: This note has been constructed using a voice recognition system   **

## 2023-05-07 NOTE — ASSESSMENT & PLAN NOTE
· Patient was brought in from independent living with generalized weakness, sore throat, cough, fever  · tested positive for COVID-19  · Chest x-ray clear  · Initiate remdesivir based on risk factors  · Monitor O2 sats  · PT OT  · Symptomatic treatment

## 2023-05-08 VITALS
HEIGHT: 66 IN | HEART RATE: 78 BPM | BODY MASS INDEX: 22.68 KG/M2 | WEIGHT: 141.09 LBS | OXYGEN SATURATION: 93 % | RESPIRATION RATE: 18 BRPM | TEMPERATURE: 98.4 F | SYSTOLIC BLOOD PRESSURE: 159 MMHG | DIASTOLIC BLOOD PRESSURE: 84 MMHG

## 2023-05-08 LAB
ALBUMIN SERPL BCP-MCNC: 3.4 G/DL (ref 3.5–5)
ALP SERPL-CCNC: 48 U/L (ref 34–104)
ALT SERPL W P-5'-P-CCNC: <3 U/L (ref 7–52)
ANION GAP SERPL CALCULATED.3IONS-SCNC: 8 MMOL/L (ref 4–13)
AST SERPL W P-5'-P-CCNC: 11 U/L (ref 13–39)
BASOPHILS # BLD AUTO: 0.02 THOUSANDS/ÂΜL (ref 0–0.1)
BASOPHILS NFR BLD AUTO: 0 % (ref 0–1)
BILIRUB SERPL-MCNC: 0.52 MG/DL (ref 0.2–1)
BUN SERPL-MCNC: 13 MG/DL (ref 5–25)
CALCIUM ALBUM COR SERPL-MCNC: 8.9 MG/DL (ref 8.3–10.1)
CALCIUM SERPL-MCNC: 8.4 MG/DL (ref 8.4–10.2)
CHLORIDE SERPL-SCNC: 108 MMOL/L (ref 96–108)
CO2 SERPL-SCNC: 24 MMOL/L (ref 21–32)
CREAT SERPL-MCNC: 0.7 MG/DL (ref 0.6–1.3)
CRP SERPL QL: 29.7 MG/L
EOSINOPHIL # BLD AUTO: 0.1 THOUSAND/ÂΜL (ref 0–0.61)
EOSINOPHIL NFR BLD AUTO: 1 % (ref 0–6)
ERYTHROCYTE [DISTWIDTH] IN BLOOD BY AUTOMATED COUNT: 13.1 % (ref 11.6–15.1)
GFR SERPL CREATININE-BSD FRML MDRD: 72 ML/MIN/1.73SQ M
GLUCOSE SERPL-MCNC: 85 MG/DL (ref 65–140)
HCT VFR BLD AUTO: 43.9 % (ref 34.8–46.1)
HGB BLD-MCNC: 14.4 G/DL (ref 11.5–15.4)
IMM GRANULOCYTES # BLD AUTO: 0.04 THOUSAND/UL (ref 0–0.2)
IMM GRANULOCYTES NFR BLD AUTO: 1 % (ref 0–2)
LYMPHOCYTES # BLD AUTO: 1.33 THOUSANDS/ÂΜL (ref 0.6–4.47)
LYMPHOCYTES NFR BLD AUTO: 19 % (ref 14–44)
MCH RBC QN AUTO: 31.2 PG (ref 26.8–34.3)
MCHC RBC AUTO-ENTMCNC: 32.8 G/DL (ref 31.4–37.4)
MCV RBC AUTO: 95 FL (ref 82–98)
MONOCYTES # BLD AUTO: 1.02 THOUSAND/ÂΜL (ref 0.17–1.22)
MONOCYTES NFR BLD AUTO: 14 % (ref 4–12)
NEUTROPHILS # BLD AUTO: 4.59 THOUSANDS/ÂΜL (ref 1.85–7.62)
NEUTS SEG NFR BLD AUTO: 65 % (ref 43–75)
NRBC BLD AUTO-RTO: 0 /100 WBCS
PLATELET # BLD AUTO: 162 THOUSANDS/UL (ref 149–390)
PMV BLD AUTO: 9.9 FL (ref 8.9–12.7)
POTASSIUM SERPL-SCNC: 4 MMOL/L (ref 3.5–5.3)
PROCALCITONIN SERPL-MCNC: 0.05 NG/ML
PROT SERPL-MCNC: 6.2 G/DL (ref 6.4–8.4)
RBC # BLD AUTO: 4.62 MILLION/UL (ref 3.81–5.12)
SODIUM SERPL-SCNC: 140 MMOL/L (ref 135–147)
WBC # BLD AUTO: 7.1 THOUSAND/UL (ref 4.31–10.16)

## 2023-05-08 RX ORDER — GUAIFENESIN/DEXTROMETHORPHAN 100-10MG/5
10 SYRUP ORAL EVERY 4 HOURS PRN
Refills: 0
Start: 2023-05-08

## 2023-05-08 RX ORDER — ACETAMINOPHEN 325 MG/1
975 TABLET ORAL EVERY 8 HOURS SCHEDULED
Refills: 0
Start: 2023-05-08

## 2023-05-08 RX ADMIN — Medication 3000 UNITS: at 08:06

## 2023-05-08 RX ADMIN — CARBIDOPA AND LEVODOPA 1 TABLET: 25; 100 TABLET ORAL at 08:06

## 2023-05-08 RX ADMIN — DILTIAZEM HYDROCHLORIDE 30 MG: 30 TABLET ORAL at 08:06

## 2023-05-08 RX ADMIN — ENOXAPARIN SODIUM 30 MG: 30 INJECTION SUBCUTANEOUS at 08:06

## 2023-05-08 NOTE — DISCHARGE SUMMARY
[facility]  Discharge- Patrick Aguirre 7/29/1925, 80 y o  female MRN: [id number]  Unit/Bed#: S -01 Encounter: [id number]  Primary Care Provider: Db Layne DO   Date and time admitted to hospital: 5/7/2023 10:40 AM    * generalized weakness due to COVID-19  Assessment & Plan  · Patient was brought in from independent living with generalized weakness, sore throat, cough, fever  · tested positive for COVID-19  · Chest x-ray clear  · remdesivir day #2  · Stable O2 sats  · PT OT input appreciated  · Symptomatic treatment    Parkinson's disease ([facility] [address] )  Assessment & Plan  · Continue Sinemet    Primary hypertension  Assessment & Plan  · Continue cardizem    Breast cancer [facility])  Assessment & Plan  · History in 2011      Medical Problems     Resolved Problems  Date Reviewed: 5/8/2023   None       Discharging Physician / Practitioner: Louis Velez PA-C  PCP: Db Layne DO  Admission Date:   Admission Orders (From admission, onward)     Ordered        05/07/23 1415  [address] [place] [address],[address] [place]  Once                      Discharge Date: 05/08/23    Consultations During Hospital Stay:  · none    Procedures Performed:   · none    Significant Findings / Test Results:   · Chest x-ray with no active disease    Incidental Findings:   · none      Test Results Pending at Discharge (will require follow up):   · none     Outpatient Tests Requested:  · none    Complications:  none    Reason for Admission: weakness    Hospital Course:   Patrick Aguirre is a 80 y o  female patient who originally presented to the hospital on 5/7/2023 due to severe generalized weakness  Patient was unable to ambulate at her independent living facility  She was also noting low-grade fever, cough, and sore throat  She tested positive for COVID-19  She was admitted to the hospital for observation and did not require any supplemental oxygen or IV steroids  She did receive IV remdesivir   She responded quickly and worked well with "PT/OT  She was stable to return to her prior facility    The patient, initially admitted to the hospital as inpatient, was discharged earlier than expected given the following: rapid improvment  Please see above list of diagnoses and related plan for additional information  Condition at Discharge: stable    Discharge Day Visit / Exam:   Subjective:  Patient still with sore throat/froggy voice and mild cough but no fever, SOB, chest pain  Reports chronically poor appetite, but is drinking  Vitals: Blood Pressure: 159/84 (05/08/23 0804)  Pulse: 78 (05/08/23 0804)  Temperature: 98 4 °F (36 9 °C) (05/08/23 0804)  Temp Source: Oral (05/07/23 1634)  Respirations: 18 (05/07/23 2134)  Height: 5' 6\" (167 6 cm) (05/07/23 1634)  Weight - Scale: 64 kg (141 lb 1 5 oz) (05/07/23 1634)  SpO2: 93 % (05/08/23 0804)  Exam:   Physical Exam  Vitals reviewed  Constitutional:       General: She is not in acute distress  Appearance: Normal appearance  She is not ill-appearing, toxic-appearing or diaphoretic  Comments: Appears younger than stated age   HENT:      Mouth/Throat:      Pharynx: Oropharynx is clear  No oropharyngeal exudate  Comments: Hoarse voice  Eyes:      General: No scleral icterus  Right eye: No discharge  Left eye: No discharge  Conjunctiva/sclera: Conjunctivae normal    Cardiovascular:      Rate and Rhythm: Normal rate and regular rhythm  Heart sounds: No murmur heard  Pulmonary:      Effort: No respiratory distress  Breath sounds: Normal breath sounds  No stridor  No wheezing, rhonchi or rales  Abdominal:      General: There is no distension  Palpations: Abdomen is soft  Tenderness: There is no abdominal tenderness  There is no guarding  Musculoskeletal:      Right lower leg: No edema  Left lower leg: No edema  Skin:     General: Skin is warm and dry  Coloration: Skin is not jaundiced or pale        Findings: No bruising, erythema, lesion " or rash  Neurological:      General: No focal deficit present  Mental Status: She is alert  Mental status is at baseline  Comments: Good historian no confusion   Psychiatric:         Mood and Affect: Mood normal           Discussion with Family: Updated  (daughter) via phone  Discharge instructions/Information to patient and family:   See after visit summary for information provided to patient and family  Provisions for Follow-Up Care:  See after visit summary for information related to follow-up care and any pertinent home health orders  Disposition:   Return to [facility]    Planned Readmission: none     Discharge Statement:  I spent 35 minutes discharging the patient  This time was spent on the day of discharge  I had direct contact with the patient on the day of discharge  Greater than 50% of the total time was spent examining patient, answering all patient questions, arranging and discussing plan of care with patient as well as directly providing post-discharge instructions  Additional time then spent on discharge activities  Case was discussed with case management and nursing team as well as PT    Discharge Medications:  See after visit summary for reconciled discharge medications provided to patient and/or family        **Please Note: This note may have been constructed using a voice recognition system**

## 2023-05-08 NOTE — OCCUPATIONAL THERAPY NOTE
Occupational Therapy Evaluation     Patient Name: Terri CASTRO Date: 5/8/2023  Problem List  Principal Problem:    generalized weakness due to COVID-19  Active Problems:    [facility]    Primary hypertension    Breast cancer [facility])    Past Medical History  Past Medical History:   Diagnosis Date    Anxiety     Arthritis     Gastroparesis     History of atherosclerosis     History of breast cancer     History of osteoarthritis     Long term use of drug     Malignant neoplasm of breast ([facility] [address] ) 04/11/2011    left w/mastectomy; age 80    Osteomalacia      Past Surgical History  Past Surgical History:   Procedure Laterality Date    BREAST BIOPSY Left 04/11/2011    u/s core-positive    HYSTERECTOMY  1974    age 52    MASTECTOMY Left 05/04/2011    malignant         05/08/23 1119   OT Last Visit   OT Visit Date 05/08/23   Note Type   Note type Evaluation   Pain Assessment   Pain Assessment Tool 0-10   Pain Score No Pain   Restrictions/Precautions   Weight Bearing Precautions Per Order No   Other Precautions Contact/isolation;Droplet precautions; Airborne/isolation; Chair Alarm; Bed Alarm   Home Living   Type of Home Assisted living  ([address] [place] [address])   Home Layout One level;Performs ADLs on one level; Able to live on main level with bedroom/bathroom   Bathroom Shower/Tub Walk-in shower   Bathroom Toilet Standard   Bathroom Equipment Grab bars in shower; Shower chair   [address] Walker   Prior Function   Level of [place] Needs assistance with ADLs; Independent with functional mobility; Needs assistance with IADLS   Lives With Alone   Receives Help From Family;Home health  (HHA 4x/week (assist in AM for dressing and showering))   IADLs Family/Friend/Other provides transportation; Family/Friend/Other provides meals; Independent with medication management   Falls in the last 6 months 0   Vocational Retired   Comments Pt reports she has a HHA that "assists with dressing/bathig during week, (I) with toileting  Walks to [employer] for meals with rollator  Pt reports having call bell that she can activate at anytime if she needed assistance  Lifestyle   Autonomy Pt lives at [facility] (I) living, A for ADLs/IADLs, Rollatr, (-) falls, (-)    Reciprocal Relationships 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[facility], [facility], Family   Service to Others Retired   Joao Espinoza Enjoys the company from her [address]   Additional Pertinent History COVID-19 +   Family/Caregiver Present No   Additional General Comments Pt has Parkinson's   Subjective   Subjective \"I can dress myself but I love the company so I have the girl come\"   ADL   Where Assessed Standing at [address]  [place]; Beverage management   Grooming Assistance 6  Modified Independent   Grooming Deficit Setup;Standing with assistive device; Wash/dry hands;Brushing hair  (standing at sink in bathroom, unilateral support on RW)   UB Bathing Assistance 5  Supervision/Setup   LB Bathing Assistance 5  Supervision/Setup   UB Dressing Assistance 6  Modified independent   LB Dressing Assistance 5  Supervision/Setup   LB Dressing Deficit Setup; Requires assistive device for steadying;Verbal cueing;Supervision/safety; Increased time to complete; Thread RLE into underwear;Pull up over hips; Thread LLE into underwear  (sitting EOB, increased time to thread over toes)   Toileting Assistance  5  Supervision/Setup   Bed Mobility   Supine to Sit 6  Modified independent   Additional items Increased time required   Additional Comments Pt OOB to recliner at end of session   Transfers   Sit to Stand 5  Supervision   Additional items Assist x 1; Increased time required   Stand to Sit 5  Supervision   Additional items Assist x 1; Increased time required   Functional Mobility   Functional Mobility 5  Supervision   Additional Comments Short household distance to bathroom and back with RW " with (S)  Additional items Rolling walker   Balance   Static Sitting Good   Dynamic Sitting Fair +   Static Standing Fair +   Dynamic Standing Fair   Activity Tolerance   Activity Tolerance Patient tolerated treatment well   Medical Staff Made Aware Spoke to PT FRANNY CHAVEZ updated on outcomes   Nurse Made Aware yes, RN ok to see pt   RUE Assessment   RUE Assessment WFL   LUE Assessment   LUE Assessment WFL   Hand Function   Gross Motor Coordination Functional   Fine Motor Coordination Functional   Sensation   Light Touch Partial deficits in the RUE   Sharp/Dull No apparent deficits   Vision-Basic Assessment   Current Vision Wears glasses all the time   Cognition   Overall Cognitive Status [facility]   Arousal/Participation Alert; Cooperative   Attention Within functional limits   Orientation Level Oriented X4   Memory Within functional limits   Following Commands Follows all commands and directions without difficulty   Comments Pleasant and agreeable, good safety awareness and command follow  Able to express wants/needs appropriately, motivated  Assessment   Limitation Decreased endurance;Decreased self-care trans;Decreased high-level ADLs   Prognosis Good   Assessment Pt is a 80 y o  female seen for OT evaluation s/p admit to [address] [place] [address] on 5/7/2023 w/COVID-19  See medical hx above for extensive list of comorbidities and significant PMHx affecting pt's functional performance at time of eval  Personal and environmental factors affecting patient at time of evaluation include advanced age and difficulty completing IADLs  Personal factors supporting patient at time of evaluation include social support, attitude towards recovery, current habits  and accessible home environment  Performance deficits impacting patients overall occupational performance at this time are endurance, forward functional reach and functional standing tolerance, that result in activity limitations (as is outlined above in flowsheet)   At time of evaluation, pt demonstrated baseline level of function during completion of ADL's and functional mobility w/ RW  Pt with no concerns for safe DC home and reporting feeling at/close to functional baseline  Pt has adequate social support at [facility]  Pt with no acute OT needs at this time and will be DC from inpatient OT caseload  Please reconsult if functional status changes  Goals   Patient Goals to go home   Plan   Treatment Interventions   (Eval only)   Recommendation   OT Discharge Recommendation No rehabilitation needs  (Return to [facility] with continued social support for ADL/IADL completion)   Additional Comments  The patient's raw score on the AM-PAC Daily Activity inpatient short form is 22, standardized score is 47 1, greater than 39 4  From an OT standpoint, patients at this level may benefit from d/c to No rehabilitation needs  AM-PAC Daily Activity Inpatient   Lower Body Dressing 3   Bathing 3   Toileting 4   Upper Body Dressing 4   Grooming 4   Eating 4   Daily Activity Raw Score 22   Daily Activity Standardized Score (Calc for Raw Score >=11) 47  1   AM-PAC Applied Cognition Inpatient   Following a Speech/Presentation 4   Understanding Ordinary Conversation 4   Taking Medications 3   Remembering Where Things Are Placed or Put Away 4   Remembering List of 4-5 Errands 3   Taking Care of Complicated Tasks 3   Applied Cognition Raw Score 21   Applied Cognition Standardized Score 44 3   End of Consult   Patient Position at End of Consult Bedside chair;Bed/Chair alarm activated; All needs within reach   Nurse Communication Nurse aware of consult     Hanny Hull OTR/L  [place] License [id number]  [address] [facility] [place] [phone]

## 2023-05-08 NOTE — ASSESSMENT & PLAN NOTE
· Patient was brought in from independent living with generalized weakness, sore throat, cough, fever  · tested positive for COVID-19  · Chest x-ray clear  · remdesivir day #2  · Stable O2 sats  · PT OT input appreciated  · Symptomatic treatment

## 2023-05-08 NOTE — PLAN OF CARE
Problem: PHYSICAL THERAPY ADULT  Goal: Performs mobility at highest level of function for planned discharge setting  See evaluation for individualized goals  Description: Treatment/Interventions: Functional transfer training, LE strengthening/ROM, Therapeutic exercise, Endurance training, Patient/family training, Equipment eval/education, Bed mobility, Gait training          See flowsheet documentation for full assessment, interventions and recommendations  Note:    Problem List: Decreased strength, Decreased range of motion, Decreased endurance, Impaired balance, Decreased mobility, Decreased safety awareness, Impaired hearing  Assessment: Pt with severe generalized weakness such that she was unable to get out of bed  Dx: SLFYA-02, Parkinson's, and primary HTN  order placed for PT eval and tx, w/ activity order of activity as tolerated  pt presents w/ comorbidities of PD, arthritis, gastroparesis, breast cancer, and OA and personal factors of advanced age, mobilizing w/ assistive device, limited home support, anxiety and hearing impairments  pt presents w/ weakness, decreased ROM, decreased endurance, impaired balance, gait deviations, impaired hearing, decreased safety awareness and fall risk  these impairments are evident in findings from physical examination (weakness and decreased ROM), mobility assessment (need for standby assist w/ all phases of mobility when usually mobilizing independently, tolerance to only 100 feet of ambulation and need for cueing for mobility technique), and Barthel Index: 65/100  pt needed input for task focus and mobility technique  pt is at risk for falls due to physical and safety awareness deficits  pt's clinical presentation is unstable/unpredictable (evident in poor blood pressure control, need for assist w/ all phases of mobility when usually mobilizing independently, tolerance to only 100 feet of ambulation and need for input for task focus and mobility technique)   pt needs inpatient PT tx to improve mobility deficits and progress mobility training as appropriate  discharge destination depends on progression of mobility status and level of support available at independent living  Pt may benefit from inpatient rehab to reduce fall risk and maximize level of functional independence  PT Discharge Recommendation: Return to facility with rehabilitation services (vs inpatient rehab)    See flowsheet documentation for full assessment

## 2023-05-08 NOTE — PHYSICAL THERAPY NOTE
PHYSICAL THERAPY EVALUATION NOTE    Patient Name: Amberly Stuart  BJHRD'I Date: 5/8/2023  AGE:   80 y o  Mrn:   [id number]  ADMIT DX:  Difficulty walking [R26 2]  COVID-19 [U07 1]    Past Medical History:   Diagnosis Date    Anxiety     Arthritis     Gastroparesis     History of atherosclerosis     History of breast cancer     History of osteoarthritis     Long term use of drug     Malignant neoplasm of breast ([facility] [address] ) 04/11/2011    left w/mastectomy; age 80    Osteomalacia      Length Of Stay: 1  PHYSICAL THERAPY EVALUATION :    05/08/23 0918   PT Last Visit   PT Visit Date 05/08/23   Pain Assessment   Pain Assessment Tool 0-10   Pain Score No Pain   Restrictions/Precautions   Other Precautions Contact/isolation; Airborne/isolation; Chair Alarm; Bed Alarm; Fall Risk;Hard of hearing  ([place])   Home Living   Type of Home   ([facility] independent living)   Additional Comments ambulates w/ roller walker  owns a cane  independent w/ ADLs except needing assistance w/ bathing and lower body dressing in the morning  receives meals in the dining room  no falls in last 6 months  General   Additional Pertinent History 5/8/23 at 8:04 blood pressure was 159/84   Family/Caregiver Present No   Cognition   Arousal/Participation Cooperative   Orientation Level Oriented to person;Oriented to place;Oriented to time; Other (Comment)  (pt was identified w/ full name, birth date)   Following Commands Follows one step commands with increased time or repetition   Comments room air resting pulse ox 95% and 71 BPM, active 93% and 83 BPM    Subjective   Subjective pt seen supine in bed  agreed to PT eval  denied pain or dizziness  pt had left shoulder but has been feeling better since receiving an injection  redirection was needed for task focus     RUE Assessment   RUE Assessment WFL   LUE Assessment   LUE Assessment X  (limited shoulder ROM, otherwise WFL) RLE Assessment   RLE Assessment WFL  (3+ to 4-/5)   LLE Assessment   LLE Assessment WFL  (3+ to 4-/5)   Vision-Basic Assessment   Current Vision Wears glasses all the time   Coordination   Sensation X  (impaired hearing)   Light Touch   RLE Light Touch Grossly intact   LLE Light Touch Grossly intact   Bed Mobility   Supine to Sit 5  Supervision   Additional items HOB elevated; Bedrails; Increased time required;Verbal cues  (for bedrail use)   Transfers   Sit to Stand 5  Supervision   Additional items Increased time required   Stand to Sit 5  Supervision   Additional items Increased time required;Verbal cues  (for body positioning)   Ambulation/Elevation   Gait pattern Narrow KANDICE;Shuffling; Foward flexed   Gait Assistance 5  Supervision  (pt bumped walker into obstacles 4x during ambulation)   Additional items Verbal cues  (for walker positioning, safety)   Assistive Device Rolling walker   Distance 100 feet  (additional not possible due to fatigue)   Balance   Static Sitting Good   Dynamic Sitting Fair -   Static Standing Fair -  (w/ roller walker)   Ambulatory Fair -  (w/ roller walker)   Activity Tolerance   Activity Tolerance Patient limited by fatigue   Nurse Made Aware spoke to Odette Francisco, Mirtha Munguia CM   Assessment   Problem List Decreased strength;Decreased range of motion;Decreased endurance; Impaired balance;Decreased mobility; Decreased safety awareness; Impaired hearing   Assessment Pt with severe generalized weakness such that she was unable to get out of bed  Dx: [id number]-02, Parkinson's, and primary HTN  order placed for PT eval and tx, w/ activity order of activity as tolerated  pt presents w/ comorbidities of PD, arthritis, gastroparesis, breast cancer, and OA and personal factors of advanced age, mobilizing w/ assistive device, limited home support, anxiety and hearing impairments   pt presents w/ weakness, decreased ROM, decreased endurance, impaired balance, gait deviations, impaired hearing, decreased safety awareness and fall risk  these impairments are evident in findings from physical examination (weakness and decreased ROM), mobility assessment (need for standby assist w/ all phases of mobility when usually mobilizing independently, tolerance to only 100 feet of ambulation and need for cueing for mobility technique), and Barthel Index: 65/100  pt needed input for task focus and mobility technique  pt is at risk for falls due to physical and safety awareness deficits  pt's clinical presentation is unstable/unpredictable (evident in poor blood pressure control, need for assist w/ all phases of mobility when usually mobilizing independently, tolerance to only 100 feet of ambulation and need for input for task focus and mobility technique)  pt needs inpatient PT tx to improve mobility deficits and progress mobility training as appropriate  discharge destination depends on progression of mobility status and level of support available at independent living  Pt may benefit from inpatient rehab to reduce fall risk and maximize level of functional independence  Goals   Patient Goals go home  live my own life  i don't want to go to rehab again  STG Expiration Date 05/18/23   Short Term Goal #1 pt will:  Increase bilateral LE strength 1/2 grade to facilitate independent mobility, Perform bed mobility independently to increase level of independence, Perform all transfers independently to improve independence, Ambulate 300 ft  with roller walker independently w/o LOB to improve functional independence, Increase ambulatory balance 1 grade to decrease risk for falls, Complete exercise program independently to increase strength and endurance, Tolerate 3 hr OOB to faciliate upright tolerance, Improve Barthel Index score to 85 or greater to facilitate independence and Complete Timed Up and Go or Comfortable Gait Speed to further assess mobility and monitor progress   PT Treatment Day 1   Plan Treatment/Interventions Functional transfer training;LE strengthening/ROM; Therapeutic exercise; Endurance training;Patient/family training;Equipment eval/education; Bed mobility;Gait training   PT Frequency 3-5x/wk   Recommendation   PT Discharge Recommendation Return to facility with rehabilitation services  (vs inpatient rehab)   Additional Comments discharge destination depends on progression of mobility status and level of support available at [facility] 8 in Flat Bed Without Bedrails 4   Lying on Back to Sitting on Edge of Flat Bed Without Bedrails 3   Moving Bed to Chair 3   Standing Up From Chair Using Arms 3   Walk in Room 3   Climb 3-5 Stairs With Railing 1   Basic Mobility Inpatient Raw Score 17   Basic Mobility Standardized Score 39 67   Highest Level Of Mobility   -[facility] Goal 5: Stand one or more mins   -[facility] Achieved 7: Walk 25 feet or more   Barthel Index   Feeding 10   Bathing 0   Grooming Score 5   Dressing Score 5   Bladder Score 10   Bowels Score 10   Toilet Use Score 5   Transfers (Bed/Chair) Score 10   Mobility (Level Surface) Score 10   Stairs Score 0   Barthel Index Score 65   Additional Treatment Session   Start Time 7105   End Time 0928   Treatment Assessment Pt agreed to participate in additional ambulation to address physical and mobility deficits noted during eval  Sit <---> stand transfer w/ modified independent  Ambulated 160 feet w/ roller walker w/ supervision  Therapist provided education to pt including walker management, transfer technique, scanning environment for obstacles  Pt shows improvement w/ increased activity tolerance but continued to have difficulty w/ obstacle negotiation and safety awareness  Further inpatient PT intervention is necessary to decrease fall risk and maximize functional independence     Equipment Use roller walker   Additional Treatment Day 1   End of Consult   Patient Position at End of Consult Bedside chair; All needs within reach;Bed/Chair alarm activated     The patient's AM-PAC Basic Mobility Inpatient Short Form Raw Score is 17  A Raw score of greater than 16 suggests the patient may benefit from discharge to home  Please also refer to the recommendation of the Physical Therapist for safe discharge planning  Skilled PT recommended while in hospital and upon DC to progress pt toward treatment goals       Duglas Perez, PT

## 2023-05-08 NOTE — DISCHARGE INSTR - AVS FIRST PAGE
Dear Diana Mcdaniel,     It was our pleasure to care for you here at [facility]  It is our hope that we were always able to exceed the expected standards for your care during your stay  You were hospitalized due to covid-19 with weakness  You were cared for on the third floor by Alessandro Blunt PA-C under the service of Enrrique Aaron MD with the [facility] who covers for your primary care physician (PCP), Nash Mckeon DO, while you were hospitalized  If you have any questions or concerns related to this hospitalization, you may contact us at [phone]  For follow up as well as any medication refills, we recommend that you follow up with your primary care physician  A registered nurse will reach out to you by phone within a few days after your discharge to answer any additional questions that you may have after going home  However, at this time we provide for you here, the most important instructions / recommendations at discharge:     Notable Medication Adjustments -   Supportive medicine for cough and sore throat as well as Tylenol as needed  Testing Required after Discharge -     ** Please contact your PCP to request testing orders for any of the testing recommended here **  Important follow up information -   Family doctor  Other Instructions -   Stay hydrated and get adequate rest  PT/OT  Please review this entire after visit summary as additional general instructions including medication list, appointments, activity, diet, any pertinent wound care, and other additional recommendations from your care team that may be provided for you        Sincerely,     Alessandro Blunt PA-C

## 2023-05-08 NOTE — CASE MANAGEMENT
Case Management Assessment & Discharge Planning Note    Patient name Patrick Aguirre  Location S /S -01 MRN [id number]  : 1925 Date 2023       Current Admission Date: 2023  Current Admission Diagnosis:generalized weakness due to COVID-19   Patient Active Problem List    Diagnosis Date Noted   • generalized weakness due to COVID-19 2023   • Primary osteoarthritis of left shoulder 2023   • Left leg swelling 2022   • Diarrhea 2022   • Other chest pain 2021   • REM behavioral disorder 2021   • Difficulty urinating 2020   • Depression with anxiety 2018   • Lumbar radiculopathy 2018   • Tremor 2018   • Primary hypertension 2018   • Parkinson's disease ([facility] ) 2017   • Hoarseness 2017   • Esophageal reflux 2016   • Vitamin D deficiency 2015   • Dyspnea 2015   • Breast cancer ([facility] ) 2015   • Anemia 10/21/2014   • Fatigue 2013   • Nontoxic single thyroid nodule 2013   • Arthritis 12/10/2012   • Atherosclerosis 12/10/2012   • Gastroparesis 12/10/2012   • Hyperlipidemia 2012   • Osteoporosis 2012      LOS (days): 1  Geometric Mean LOS (GMLOS) (days): 2 70  Days to GMLOS:1 8     OBJECTIVE:    Risk of Unplanned Readmission Score: 11 53         Current admission status: Inpatient       Preferred Pharmacy:   [address] [place] [address]  Phone: [phone] Fax: [phone]    Primary Care Provider: Db Layne DO    Primary Insurance: MEDICARE  Secondary Insurance: BLUE CROSS    ASSESSMENT:  Coreen Joshi Proxies    There are no active Health Care Proxies on file                        Patient Information  Admitted from[de-identified] Facility ([address] [place] [address])  Mental Status: Alert (spoke with her via room phone)  During Assessment patient was accompanied by: Not accompanied during assessment  Assessment information provided by[de-identified] Patient  Primary Caregiver: Self  Support Systems: Home care staff, Family members  [place] of Residence: [address] [facility] [address] do you live in?: [facility] entry access options   Select all that apply : No steps to enter home  Type of Current Residence: Facility ([address] [place] [address])  Upon entering residence, is there a bedroom on the main floor (no further steps)?: Yes  Upon entering residence, is there a bathroom on the main floor (no further steps)?: Yes  In the last 12 months, was there a time when you were not able to pay the mortgage or rent on time?: No  In the last 12 months, how many places have you lived?: 1  In the last 12 months, was there a time when you did not have a steady place to sleep or slept in a shelter (including now)?: No  Homeless/housing insecurity resource given?: N/A  Living Arrangements: Other (Comment) ([address] [facility])    Activities of Daily Living Prior to Admission  Functional Status: Assistance  Completes ADLs independently?: No  Level of ADL dependence: Assistance  Ambulates independently?: Yes  Does patient use assisted devices?: Yes  Assisted Devices (DME) used: Ava Webster  Does patient currently own DME?: Yes  What DME does the patient currently own?: Ava Webster  Does patient have a history of Outpatient Therapy (PT/OT)?: No  Does the patient have a history of Short-Term Rehab?: Yes ([facility]ol Miller)  Does patient currently have [address]?: No         Patient Information Continued  Does patient have prescription coverage?: Yes  Within the past 12 months, you worried that your food would run out before you got the money to buy more : Never true  Within the past 12 months, the food you bought just didn't last and you didn't have money to get more : Never true  Food insecurity resource given?: N/A  Does patient receive dialysis treatments?: No  Does patient have a history of substance abuse?: No  Does patient have a history of Mental Health Diagnosis?: No         Means of Transportation  Means of Transport to Appts[de-identified] Family transport  In the past 12 months, has lack of transportation kept you from medical appointments or from getting medications?: No  In the past 12 months, has lack of transportation kept you from meetings, work, or from getting things needed for daily living?: No  Was application for public transport provided?: N/A        DISCHARGE DETAILS:    Discharge planning discussed with[de-identified] patient by room phone; daughter, Lydia Thomas, by phone; Analisa Dubon at [address] of Choice: Yes (re: facility return)  Comments - Freedom of Choice: relays preference to return to her room in [place] [address] at [address] contacted family/caregiver?: Yes  Were Treatment Team discharge recommendations reviewed with patient/caregiver?: Yes  Did patient/caregiver verbalize understanding of patient care needs?: N/A- going to facility  Were patient/caregiver advised of the risks associated with not following Treatment Team discharge recommendations?: Yes    Contacts  Patient Contacts: Lydia Thomas, daughter  Relationship to Patient[de-identified] Family  Contact Method: Phone  Phone Number: [phone]  Reason/Outcome: Continuity of Care, Emergency Contact, Referral, Discharge [address]         Is the patient interested in [facility] at discharge?: No    DME Referral Provided  Referral made for DME?: No    Other Referral/Resources/Interventions Provided:  Interventions: Facility Return  Referral Comments: Patient admitted due to COVID+; lives at [facility], independent living  PT sharlene recommending facility return vs rehab pending availability for assistance, but patient ambulating 100' then 80' with therapy, supervision for transfers  Call made to [facility] and spoke with Analisa Dubon - PT notes faxed to her at: [phone] for review   Call made to patient's room to discuss care and d/c plan  Patient confirms that she lives at [facility] and states that she does have a home health aide that comes in 4x/week to help her in the AM and while taking a shower  Aware that she will need to quarantine in her room upon facility return, and confirms ability to do same  Reports that daughter, Val Landrum, is main  and will need to be called to arrange how she would get home (unsure if she will be able to transport or if she will need transportation arranged)  Call made to patient's daughter, Val Landrum, and reviewed likely d/c for today  Daughter confirmed that she would be able to transport patient back to [facility], just needs a call when she will be ready  Daughter aware that this writer needs to contact [facility] after PT notes are reviewed, but anticipate patient should be able to [address] orders; daughter relays preference to use PT/OT through [facility]) so ambulatory orders for PT/OT needed      Would you like to participate in our [address] service program?  : No - Declined    Treatment Team Recommendation: Facility Return  Discharge Destination Plan[de-identified] Facility Return ([address]  Transport at Discharge : Family

## 2023-05-08 NOTE — ED PROVIDER NOTES
History  Chief Complaint   Patient presents with   • Difficulty Walking     Pt arrives via ems from [facility], as per ems pt was found laying on bed with legs dangling unable to get up, pt reports feeling weak today and fatigued, also c/o sore throat     80year-old female coming into the ED for evaluation of generalized weakness, unable to ambulate this morning, with associated sore throat, cough, low-grade fever per the patient at the facility  Her daughter at bedside states that are COVID-19 cases going to the facility  Patient denies any focal weakness  History provided by:  Patient   used: No        Prior to Admission Medications   Prescriptions Last Dose Informant Patient Reported? Taking?    Diclofenac Sodium (VOLTAREN) 1 %   No No   Sig: Apply 2 g topically every 12 (twelve) hours as needed (left shoulder pain/OA)   acetaminophen (TYLENOL) 325 mg tablet   Yes No   Sig: Take 325 mg by mouth 2 (two) times a day before breakfast and lunch   calcium citrate-vitamin D (CITRACAL+D) 315-200 MG-UNIT per tablet   Yes No   Sig: Take 1 tablet by mouth daily   carbidopa-levodopa (SINEMET)  mg per tablet   No No   Sig: Take 1 tablet by mouth 3 (three) times a day   chlorhexidine (PERIDEX) 0 12 % solution   Yes No   Sig: Use as directed    cholecalciferol (VITAMIN D3) 1,000 units tablet   Yes No   Sig: Take 3,000 Units by mouth daily    diltiazem (CARDIZEM) 30 mg tablet   No No   Sig: Take 1 tablet (30 mg total) by mouth 2 (two) times a day   escitalopram (LEXAPRO) 10 mg tablet   Yes No   Sig: Take 1 tablet (10 mg total) by mouth daily at bedtime   traMADol (ULTRAM) 50 mg tablet   No No   Sig: TAKE 1 TABLET BY MOUTH EVERY MORNING TAKE 1 TABLET BY MOUTH EVERY EVENING OKAY TO TAKE 1 TABLET MID-DAY FOR PAIN IF NEEDED      Facility-Administered Medications: None       Past Medical History:   Diagnosis Date   • Anxiety    • Arthritis    • Gastroparesis    • History of atherosclerosis    • History of breast cancer    • History of osteoarthritis    • Long term use of drug    • Malignant neoplasm of breast ([facility] [address] ) 04/11/2011    left w/mastectomy; age 80   • Osteomalacia        Past Surgical History:   Procedure Laterality Date   • BREAST BIOPSY Left 04/11/2011    u/s core-positive   • HYSTERECTOMY  1974    age 52   • MASTECTOMY Left 05/04/2011    malignant       Family History   Problem Relation Age of Onset   • Coronary artery disease Mother    • Coronary artery disease Family    • Hyperlipidemia Family    • Osteoarthritis Family    • Lung cancer Sister 48     I have reviewed and agree with the history as documented  E-Cigarette/Vaping   • E-Cigarette Use Never User      E-Cigarette/Vaping Substances   • Nicotine No    • THC No    • CBD No    • Flavoring No    • Other No    • Unknown No      Social History     Tobacco Use   • Smoking status: Never   • Smokeless tobacco: Never   Vaping Use   • Vaping Use: Never used   Substance Use Topics   • Alcohol use: Yes     Comment: Social drinker   • Drug use: No       Review of Systems   Neurological: Positive for weakness  All other systems reviewed and are negative  Physical Exam  Physical Exam  Vitals and nursing note reviewed  Constitutional:       General: She is not in acute distress  Appearance: Normal appearance  She is well-developed and normal weight  She is ill-appearing  She is not toxic-appearing or diaphoretic  HENT:      Head: Normocephalic and atraumatic  Right Ear: External ear normal       Left Ear: External ear normal       Nose: Nose normal       Mouth/Throat:      Mouth: Mucous membranes are moist       Pharynx: Oropharynx is clear  Posterior oropharyngeal erythema present  No oropharyngeal exudate  Eyes:      Extraocular Movements: Extraocular movements intact  Conjunctiva/sclera: Conjunctivae normal       Pupils: Pupils are equal, round, and reactive to light     Cardiovascular:      Rate and Rhythm: Normal rate and regular rhythm  Pulses: Normal pulses  Heart sounds: Normal heart sounds  Pulmonary:      Effort: Pulmonary effort is normal  No respiratory distress  Breath sounds: Normal breath sounds  No wheezing, rhonchi or rales  Abdominal:      General: Abdomen is flat  Bowel sounds are normal  There is no distension or abdominal bruit  There are no signs of injury  Palpations: Abdomen is soft  There is no shifting dullness or mass  Tenderness: There is no abdominal tenderness  Hernia: No hernia is present  Genitourinary:     Adnexa: Right adnexa normal and left adnexa normal    Musculoskeletal:         General: Normal range of motion  Cervical back: Normal range of motion and neck supple  Skin:     General: Skin is warm and dry  Capillary Refill: Capillary refill takes less than 2 seconds  Neurological:      General: No focal deficit present  Mental Status: She is alert and oriented to person, place, and time  Mental status is at baseline  Cranial Nerves: No cranial nerve deficit  Sensory: No sensory deficit  Motor: No weakness  Coordination: Coordination normal       Comments: Pt very weak to both legs, barely able to hold them off the stretcher for more than 1-2 seconds  Unable to ambulate     Psychiatric:         Mood and Affect: Mood normal          Behavior: Behavior normal          Vital Signs  ED Triage Vitals   Temperature Pulse Respirations Blood Pressure SpO2   05/07/23 1046 05/07/23 1042 05/07/23 1042 05/07/23 1044 05/07/23 1042   98 2 °F (36 8 °C) 91 18 144/91 93 %      Temp Source Heart Rate Source Patient Position - Orthostatic VS BP Location FiO2 (%)   05/07/23 1046 05/07/23 1042 05/07/23 1042 05/07/23 1634 --   Oral Monitor Sitting Right arm       Pain Score       05/07/23 1630       No Pain           Vitals:    05/07/23 1600 05/07/23 1633 05/07/23 1634 05/07/23 2010   BP: 124/61 150/74 150/74 148/70   Pulse: 68 71 69    Patient Position - Orthostatic VS:   Lying          Visual Acuity      ED Medications  Medications   carbidopa-levodopa (SINEMET)  mg per tablet 1 tablet (1 tablet Oral Given 5/7/23 2010)   cholecalciferol (VITAMIN D3) tablet 3,000 Units (3,000 Units Oral Given 5/7/23 9429)   Diclofenac Sodium (VOLTAREN) 1 % topical gel 2 g (has no administration in time range)   escitalopram (LEXAPRO) tablet 10 mg (has no administration in time range)   diltiazem (CARDIZEM) tablet 30 mg (30 mg Oral Given 5/7/23 2010)   traMADol (ULTRAM) tablet 50 mg (50 mg Oral Given 5/7/23 2010)   remdesivir (Veklury) 200 mg in sodium chloride 0 9 % 290 mL IVPB (200 mg Intravenous New Bag 5/7/23 1604)     Followed by   remdesivir Zuly Barrs) 100 mg in sodium chloride 0 9 % 270 mL IVPB (has no administration in time range)   acetaminophen (TYLENOL) tablet 975 mg (975 mg Oral Given 5/7/23 2011)   phenol (CHLORASEPTIC) 1 4 % mucosal liquid 1 spray (has no administration in time range)   enoxaparin (LOVENOX) subcutaneous injection 30 mg (30 mg Subcutaneous Given 5/7/23 2010)   dextromethorphan-guaiFENesin (ROBITUSSIN DM) oral syrup 10 mL (10 mL Oral Given 5/7/23 2010)   sodium chloride 0 9 % bolus 500 mL (0 mL Intravenous Stopped 5/7/23 1233)   acetaminophen (TYLENOL) tablet 650 mg (650 mg Oral Given 5/7/23 1350)   ibuprofen (MOTRIN) tablet 600 mg (600 mg Oral Given 5/7/23 1350)       Diagnostic Studies  Results Reviewed     Procedure Component Value Units Date/Time    HS Troponin I 2hr [[id number]]  (Normal) Collected: 05/07/23 1401    Lab Status: Final result Specimen: Blood Updated: 05/07/23 1429     hs TnI 2hr 3 ng/L      Delta 2hr hsTnI -1 ng/L     FLU/RSV/COVID - if FLU/RSV clinically relevant [[id number]]  (Abnormal) Collected: 05/07/23 1133    Lab Status: Final result Specimen: Nares from Nose Updated: 05/07/23 1259     SARS-CoV-2 Positive     INFLUENZA A PCR Negative     INFLUENZA B PCR Negative     RSV PCR Negative    Narrative:      FOR PEDIATRIC PATIENTS - copy/paste COVID Guidelines URL to browser: [email or web address]    SARS-CoV-2 assay is a Nucleic Acid Amplification assay intended for the  qualitative detection of nucleic acid from SARS-CoV-2 in nasopharyngeal  swabs  Results are for the presumptive identification of SARS-CoV-2 RNA  Positive results are indicative of infection with SARS-CoV-2, the virus  causing COVID-19, but do not rule out bacterial infection or co-infection  with other viruses  Laboratories within the [place] and its  territories are required to report all positive results to the appropriate  public health authorities  Negative results do not preclude SARS-CoV-2  infection and should not be used as the sole basis for treatment or other  patient management decisions  Negative results must be combined with  clinical observations, patient history, and epidemiological information  This test has not been FDA cleared or approved  This test has been authorized by FDA under an Emergency Use Authorization  (EUA)  This test is only authorized for the duration of time the  declaration that circumstances exist justifying the authorization of the  emergency use of an in vitro diagnostic tests for detection of SARS-CoV-2  virus and/or diagnosis of COVID-19 infection under section 564(b)(1) of  the Act, 21 U  S C  810KBR-4(W)(8), unless the authorization is terminated  or revoked sooner  The test has been validated but independent review by FDA  and CLIA is pending  Test performed using [employer] GeneXpert: This RT-PCR assay targets N2,  a region unique to SARS-CoV-2  A conserved region in the E-gene was chosen  for pan-Sarbecovirus detection which includes SARS-CoV-2  According to CMS-2020-01-R, this platform meets the definition of high-throughput technology      TSH, 3rd generation with Free T4 reflex [[id number]]  (Normal) Collected: 05/07/23 1133    Lab Status: Final result Specimen: Blood from Arm, Right Updated: 05/07/23 1231     TSH 3RD GENERATON 0 670 uIU/mL     Comprehensive metabolic panel [[id number]]  (Abnormal) Collected: 05/07/23 1133    Lab Status: Final result Specimen: Blood from Arm, Right Updated: 05/07/23 1224     Sodium 137 mmol/L      Potassium 4 3 mmol/L      Chloride 105 mmol/L      CO2 26 mmol/L      ANION GAP 6 mmol/L      BUN 15 mg/dL      Creatinine 0 73 mg/dL      Glucose 95 mg/dL      Calcium 8 8 mg/dL      AST 10 U/L      ALT <3 U/L      Alkaline Phosphatase 47 U/L      Total Protein 6 5 g/dL      Albumin 3 6 g/dL      Total Bilirubin 0 73 mg/dL      eGFR 69 ml/min/1 73sq m     Narrative:      [place] guidelines for Chronic Kidney Disease (CKD):   •  Stage 1 with normal or high GFR (GFR > 90 mL/min/1 73 square meters)  •  Stage 2 Mild CKD (GFR = 60-89 mL/min/1 73 square meters)  •  Stage 3A Moderate CKD (GFR = 45-59 mL/min/1 73 square meters)  •  Stage 3B Moderate CKD (GFR = 30-44 mL/min/1 73 square meters)  •  Stage 4 Severe CKD (GFR = 15-29 mL/min/1 73 square meters)  •  Stage 5 End Stage CKD (GFR <15 mL/min/1 73 square meters)  Note: GFR calculation is accurate only with a steady state creatinine    Magnesium [[id number]]  (Normal) Collected: 05/07/23 1133    Lab Status: Final result Specimen: Blood from Arm, Right Updated: 05/07/23 1224     Magnesium 2 0 mg/dL     HS Troponin 0hr (reflex protocol) [[id number]]  (Normal) Collected: 05/07/23 1133    Lab Status: Final result Specimen: Blood from Arm, Right Updated: 05/07/23 1221     hs TnI 0hr 4 ng/L     CBC and differential [[id number]]  (Abnormal) Collected: 05/07/23 1133    Lab Status: Final result Specimen: Blood from Arm, Right Updated: 05/07/23 1141     WBC 6 91 Thousand/uL      RBC 4 49 Million/uL      Hemoglobin 14 0 g/dL      Hematocrit 42 7 %      MCV 95 fL      MCH 31 2 pg      MCHC 32 8 g/dL      RDW 12 9 %      MPV 9 7 fL      Platelets 729 Thousands/uL      nRBC 0 /100 WBCs      Neutrophils Relative 70 %      Immat GRANS % 0 %      Lymphocytes Relative 15 %      Monocytes Relative 15 %      Eosinophils Relative 0 %      Basophils Relative 0 %      Neutrophils Absolute 4 75 Thousands/µL      Immature Grans Absolute 0 03 Thousand/uL      Lymphocytes Absolute 1 03 Thousands/µL      Monocytes Absolute 1 05 Thousand/µL      Eosinophils Absolute 0 03 Thousand/µL      Basophils Absolute 0 02 Thousands/µL     UA w Reflex to Microscopic w Reflex to Culture [[id number]]     Lab Status: No result Specimen: Urine, Clean Catch                  XR chest 1 view portable   Final Result by Sammie Mcneal MD (05/07 1208)      No acute cardiopulmonary disease  Workstation performed: [id number]                    Procedures  ECG 12 Lead Documentation Only    Date/Time: 5/7/2023 10:55 AM  Performed by: Julianna Goldberg DO  Authorized by: Julianna Goldberg DO     Indications / Diagnosis:  Weakness  ECG reviewed by me, the ED Provider: yes    Patient location:  ED  Previous ECG:     Previous ECG:  Compared to current    Similarity:  No change    Comparison to cardiac monitor: Yes    Interpretation:     Interpretation: normal    Rate:     ECG rate:  89    ECG rate assessment: normal    Rhythm:     Rhythm: sinus rhythm    Ectopy:     Ectopy: none    QRS:     QRS axis:  Normal    QRS intervals:  Normal  Conduction:     Conduction: normal    ST segments:     ST segments:  Normal  T waves:     T waves: normal               ED Course  ED Course as of 05/07/23 2012   [place] May 07, 2023   1354 SARS-COV-2(!): Positive  Results reveals pt is positive for Covid19  She has been vaccinated  However, with generalized weakness unable to ambulate, will admit to the hospital for treatment, hydration, PT/OT, as she lives in the independent living section of her facility  1412 D/w Dr Anne Johnson, accepts to service                                 SBIRT 22yo+    Flowsheet Row Most Recent Value   Initial Alcohol Screen: US AUDIT-C     1  How often do you have a drink containing alcohol? 0 Filed at: 05/07/2023 1045   2  How many drinks containing alcohol do you have on a typical day you are drinking? 0 Filed at: 05/07/2023 1045   3a  Male UNDER 65: How often do you have five or more drinks on one occasion? 0 Filed at: 05/07/2023 1045   3b  FEMALE Any Age, or MALE 65+: How often do you have 4 or more drinks on one occassion? 0 Filed at: 05/07/2023 1045   Audit-C Score 0 Filed at: 05/07/2023 1045   TAMI: How many times in the past year have you    Used an illegal drug or used a prescription medication for non-medical reasons? Never Filed at: 05/07/2023 1045                    Medical Decision Making  COVID-19: acute illness or injury  Amount and/or Complexity of Data Reviewed  Labs: ordered  Decision-making details documented in ED Course  Radiology: ordered  Decision-making details documented in ED Course  ECG/medicine tests: ordered and independent interpretation performed  Decision-making details documented in ED Course  Risk  OTC drugs  Prescription drug management  Decision regarding hospitalization  Disposition  Final diagnoses:   COVID-19     Time reflects when diagnosis was documented in both MDM as applicable and the Disposition within this note     Time User Action Codes Description Comment    5/7/2023  2:13 PM Delroy Pena Add [U07 1] COVID-19       ED Disposition     ED Disposition   Admit    Condition   Stable    Date/Time   Sun May 7, 2023  2:13 PM    Comment   Case was discussed with Dr Sakshi Zepeda and the patient's admission status was agreed to be Admission Status: inpatient status to the service of Dr Sakshi Zepeda              Follow-up Information    None         Current Discharge Medication List      CONTINUE these medications which have NOT CHANGED    Details   acetaminophen (TYLENOL) 325 mg tablet Take 325 mg by mouth 2 (two) times a day before breakfast and lunch      calcium citrate-vitamin D (CITRACAL+D) 315-200 MG-UNIT per tablet Take 1 tablet by mouth daily      carbidopa-levodopa (SINEMET)  mg per tablet Take 1 tablet by mouth 3 (three) times a day  Qty: 270 tablet, Refills: 3    Associated Diagnoses: Parkinson's disease (HCC)      chlorhexidine (PERIDEX) 0 12 % solution Use as directed   Refills: 0      cholecalciferol (VITAMIN D3) 1,000 units tablet Take 3,000 Units by mouth daily       Diclofenac Sodium (VOLTAREN) 1 % Apply 2 g topically every 12 (twelve) hours as needed (left shoulder pain/OA)  Qty: 100 g, Refills: 1    Associated Diagnoses: Chronic left shoulder pain      diltiazem (CARDIZEM) 30 mg tablet Take 1 tablet (30 mg total) by mouth 2 (two) times a day  Qty: 120 tablet, Refills: 3    Associated Diagnoses: Primary hypertension      escitalopram (LEXAPRO) 10 mg tablet Take 1 tablet (10 mg total) by mouth daily at bedtime  Qty: 90 tablet, Refills: 1    Associated Diagnoses: Depression with anxiety      traMADol (ULTRAM) 50 mg tablet TAKE 1 TABLET BY MOUTH EVERY MORNING TAKE 1 TABLET BY MOUTH EVERY EVENING OKAY TO TAKE 1 TABLET MID-DAY FOR PAIN IF NEEDED  Qty: 75 tablet, Refills: 1    Associated Diagnoses: Lumbar radiculopathy             No discharge procedures on file      PDMP Review       Value Time User    PDMP Reviewed  Yes 4/17/2023 11:16 AM Kristen Fraser DO          ED Provider  Electronically Signed by           Inessa Mcghee DO  05/07/23 2013

## 2023-05-08 NOTE — CASE MANAGEMENT
Case Management Discharge Planning Note    Patient name Kd LYONS /S -01 MRN [id number]  : 1925 Date 2023       Current Admission Date: 2023  Current Admission Diagnosis:generalized weakness due to COVID-19   Patient Active Problem List    Diagnosis Date Noted   • generalized weakness due to COVID-19 2023   • Primary osteoarthritis of left shoulder 2023   • Left leg swelling 2022   • Diarrhea 2022   • Other chest pain 2021   • REM behavioral disorder 2021   • Difficulty urinating 2020   • Depression with anxiety 2018   • Lumbar radiculopathy 2018   • Tremor 2018   • Primary hypertension 2018   • Parkinson's disease ([facility] [address] ) 2017   • Hoarseness 2017   • Esophageal reflux 2016   • Vitamin D deficiency 2015   • Dyspnea 2015   • Breast cancer ([facility] [address] ) 2015   • Anemia 10/21/2014   • Fatigue 2013   • Nontoxic single thyroid nodule 2013   • Arthritis 12/10/2012   • Atherosclerosis 12/10/2012   • Gastroparesis 12/10/2012   • Hyperlipidemia 2012   • Osteoporosis 2012      LOS (days): 1  Geometric Mean LOS (GMLOS) (days): 2 70  Days to GMLOS:1 7     OBJECTIVE:  Risk of Unplanned Readmission Score: 11 56         Current admission status: Inpatient   Preferred Pharmacy:   [address] [place] [address] [address] [place] [address]  Phone: [phone] Fax: [phone]    Primary Care Provider: Jocelyn Batista DO    Primary Insurance: MEDICARE  Secondary Insurance: BLUE CROSS    DISCHARGE DETAILS:    Discharge planning discussed with[de-identified] Diane Garza at [facility]; daughter, Magalis Coats, by phone  Freedom of Choice: Yes (re: facility return)  Comments - Freedom of Choice: Crystal confirmed patient able to return to [address] contacted family/caregiver?: Yes  Were Treatment Team discharge recommendations reviewed with patient/caregiver?: Yes  Did patient/caregiver verbalize understanding of patient care needs?: N/A- going to facility  Were patient/caregiver advised of the risks associated with not following Treatment Team discharge recommendations?: Yes    Contacts  Patient Contacts: kt Mayorga  Relationship to Patient[de-identified] Family  Contact Method: Phone  Phone Number: [phone]  Reason/Outcome: Continuity of Care, Emergency Contact, Referral, Discharge [address]         Is the patient interested in "[employer] at discharge?: No    DME Referral Provided  Referral made for DME?: No    Other Referral/Resources/Interventions Provided:  Interventions: Facility Return  Referral Comments: Call received from Erick oPole at [facility] confirming patient able to return to apartment  Requesting orders for PT/OT be faxed to her; fax completed  Call made to patient's daughter, Nadine Schmitz, and she confirmed she will be over to pick-up patient around 2:30pm  Aware that orders for PT/OT to be faxed to Erick Poole to coordinate therapy start of care upon patient's return  Family also aware of quarantine criteria upon return  RN aware that family to pick-up patient around 2:30pm; will have her ready by that time      Would you like to participate in our [address] service program?  : No - Declined    Treatment Team Recommendation: Facility Return  Discharge Destination Plan[de-identified] Facility Return ([address] [facility])  Transport at Discharge : Family

## 2023-06-05 ENCOUNTER — OFFICE VISIT (OUTPATIENT)
Dept: INTERNAL MEDICINE CLINIC | Facility: CLINIC | Age: 88
End: 2023-06-05
Payer: MEDICARE

## 2023-06-05 VITALS
WEIGHT: 137 LBS | HEIGHT: 66 IN | HEART RATE: 83 BPM | DIASTOLIC BLOOD PRESSURE: 64 MMHG | OXYGEN SATURATION: 94 % | BODY MASS INDEX: 22.02 KG/M2 | RESPIRATION RATE: 16 BRPM | SYSTOLIC BLOOD PRESSURE: 92 MMHG

## 2023-06-05 DIAGNOSIS — I10 PRIMARY HYPERTENSION: ICD-10-CM

## 2023-06-05 DIAGNOSIS — Z86.16 HISTORY OF 2019 NOVEL CORONAVIRUS DISEASE (COVID-19): ICD-10-CM

## 2023-06-05 DIAGNOSIS — Z09 HOSPITAL DISCHARGE FOLLOW-UP: Primary | ICD-10-CM

## 2023-06-05 DIAGNOSIS — I95.1 ORTHOSTATIC HYPOTENSION: ICD-10-CM

## 2023-06-05 DIAGNOSIS — F41.8 DEPRESSION WITH ANXIETY: Chronic | ICD-10-CM

## 2023-06-05 PROBLEM — R39.198 DIFFICULTY URINATING: Status: RESOLVED | Noted: 2020-11-11 | Resolved: 2023-06-05

## 2023-06-05 PROBLEM — R19.7 DIARRHEA: Status: RESOLVED | Noted: 2022-02-11 | Resolved: 2023-06-05

## 2023-06-05 PROCEDURE — 99213 OFFICE O/P EST LOW 20 MIN: CPT | Performed by: INTERNAL MEDICINE

## 2023-06-05 NOTE — PATIENT INSTRUCTIONS
Add 4-5 cups of fluid per day for 1 week to catch up  Use walker at all times  Return as scheduled or as needed  If symptoms change or worsen, go to ER    Dehydration   AMBULATORY CARE:   Dehydration  is a condition that develops when your body does not have enough fluid  You may become dehydrated if you do not drink enough water or lose too much fluid  Fluid loss may also cause loss of electrolytes (minerals), such as sodium  Common symptoms include the following:   Dry eyes or mouth    Increased thirst    Dark yellow urine    Urinating little or not at all    Tiredness or body weakness    Headache, dizziness, or confusion    Irregular or fast breathing, fast or pounding heartbeat, and low blood pressure    Sudden weight loss    Seek care immediately if:   You have a seizure  You are confused or cannot think clearly  You are extremely sleepy, or another person cannot wake you  You become dizzy or faint when you stand  You are not able to urinate  You have trouble breathing  You have a fast or irregular heartbeat  Your hands or feet are cold, or your face is pale  Contact your healthcare provider if:   You have trouble drinking liquids because you are vomiting  Your symptoms get worse  You have a fever  You feel very weak or tired  You have questions or concerns about your condition or care  Treatment for dehydration  may include any of the following:  Oral liquids: If you are mildly to moderately dehydrated, you may need an oral rehydration solution (ORS)  This drink contains the right amount of salt, sugar, and minerals in water to replace body fluids  Ask your healthcare provider where you can get an ORS  Drink an ORS in small amounts if you have been vomiting  If you vomit, wait 30 minutes and try again  Ask healthcare providers how much ORS you need when you are dehydrated and how often you should drink it  A sports drink is not  the same as an ORS   Do not drink sports drinks without asking your healthcare provider  Do not drink soft drinks or fruit juices  These can make your condition worse  You may receive fluid through an IV  Electrolytes may also be included in the fluid  Prevent or manage dehydration:   Drink liquids as directed  Liquids that contain water, sugar, and minerals can help your body hold in fluid and help prevent dehydration  Drink liquids throughout the day, not just when you feel thirsty  Men should drink about 3 liters (13 eight-ounce cups) of liquid each day  Women should drink about 2 liters (9 eight-ounce cups) of liquid each day  Drink even more liquid if you will be outdoors, in the sun for a long time, or exercising  Stay cool  Limit the time you spend outdoors during the hottest part of the day  Dress in lightweight clothes  Keep track of how often you urinate  If you urinate less than usual or your urine is darker, drink more liquids  Follow up with your doctor as directed:  Write down your questions so you remember to ask them during your visits  © Copyright [employer] 2022 Information is for End User's use only and may not be sold, redistributed or otherwise used for commercial purposes  The above information is an  only  It is not intended as medical advice for individual conditions or treatments  Talk to your doctor, nurse or pharmacist before following any medical regimen to see if it is safe and effective for you

## 2023-06-05 NOTE — PROGRESS NOTES
Name: Kina Rodriguez      : 1925      MRN: [id number]  Encounter Provider: Refugio Aguilar DO  Encounter Date: 2023   Encounter department: [place] [address]     1  Hospital discharge follow-up    2  Orthostatic hypotension  Comments:  push fluids aggressively in short-term  she declines IV fluids/infusion  advised to call with an update in 2-3 days  t/c ER eval if worsening/not improving    3  History of 2019 novel coronavirus disease (COVID-19)  Comments:  recovered except as above  c/w PT/OT at IDL facility    4  Depression with anxiety  Comments:  taking lexapro and stable, c/w rx    5  Primary hypertension  Comments:  taking low dose CCB currently  will c/w rx for now as long as patient aggressively hydrates for orthostatic hypotension           Subjective      HPI     Here for follow up, went to [facility] from [facility] for fever, sore throat and was dx'd as having COVID-19  She was treated with remdesivir and discharged to home on 23  Since then she has felt better from COVD-19 but still has fatigue and tiredness  She denies dizziness or lightheadedness but feels unsteady with walking  She uses a cane today but usually has a walker with ambulation  She drinks very little water every day  She is here with her daughter(Jennifer) who provides add'l history  Orthostatics positive for drop in BP with standing  She is attending PT at her ID([facility])  ROS otherwise negative, no other complaints  Review of Systems   Constitutional: Positive for fatigue  Negative for fever  HENT: Negative for congestion  Eyes: Negative for visual disturbance  Respiratory: Negative for shortness of breath  Cardiovascular: Negative for chest pain  Gastrointestinal: Negative for abdominal pain  Endocrine: Negative for polyuria  Genitourinary: Negative for difficulty urinating  Musculoskeletal: Positive for gait problem  Skin: Negative for rash  "  Allergic/Immunologic: Negative for immunocompromised state  Neurological: Positive for weakness  Negative for dizziness, syncope and light-headedness  Psychiatric/Behavioral: Negative for dysphoric mood  The patient is not nervous/anxious  Current Outpatient Medications on File Prior to Visit   Medication Sig   • acetaminophen (TYLENOL) 325 mg tablet Take 3 tablets (975 mg total) by mouth every 8 (eight) hours   • calcium citrate-vitamin D (CITRACAL+D) 315-200 MG-UNIT per tablet Take 1 tablet by mouth daily   • carbidopa-levodopa (SINEMET)  mg per tablet Take 1 tablet by mouth 3 (three) times a day   • chlorhexidine (PERIDEX) 0 12 % solution Use as directed    • cholecalciferol (VITAMIN D3) 1,000 units tablet Take 3,000 Units by mouth daily    • Diclofenac Sodium (VOLTAREN) 1 % Apply 2 g topically every 12 (twelve) hours as needed (left shoulder pain/OA)   • diltiazem (CARDIZEM) 30 mg tablet Take 1 tablet (30 mg total) by mouth 2 (two) times a day   • escitalopram (LEXAPRO) 10 mg tablet Take 1 tablet (10 mg total) by mouth daily at bedtime   • traMADol (ULTRAM) 50 mg tablet TAKE 1 TABLET BY MOUTH EVERY MORNING TAKE 1 TABLET BY MOUTH EVERY EVENING OKAY TO TAKE 1 TABLET MID-DAY FOR PAIN IF NEEDED   • [DISCONTINUED] dextromethorphan-guaiFENesin (ROBITUSSIN DM)  mg/5 mL syrup Take 10 mL by mouth every 4 (four) hours as needed for cough   • [DISCONTINUED] phenol (CHLORASEPTIC) 1 4 % mucosal liquid Apply 1 spray to the mouth or throat every 2 (two) hours as needed (sore throat)       Objective     BP 92/64 (BP Location: Right arm, Patient Position: Standing, Cuff Size: Standard)   Pulse 83   Resp 16   Ht 5' 6\" (1 676 m)   Wt 62 1 kg (137 lb)   LMP  (LMP Unknown)   SpO2 94%   BMI 22 11 kg/m²     Physical Exam  Vitals reviewed  Constitutional:       General: She is not in acute distress  Appearance: She is ill-appearing  HENT:      Head: Normocephalic and atraumatic        Right Ear: " Tympanic membrane normal       Left Ear: Tympanic membrane normal       Mouth/Throat:      Mouth: Mucous membranes are dry  Eyes:      Conjunctiva/sclera: Conjunctivae normal    Cardiovascular:      Rate and Rhythm: Normal rate and regular rhythm  Heart sounds:      No gallop  Pulmonary:      Effort: Pulmonary effort is normal       Breath sounds: No wheezing or rales  Abdominal:      General: Bowel sounds are normal       Palpations: Abdomen is soft  Tenderness: There is no abdominal tenderness  Musculoskeletal:      Right lower leg: No edema  Left lower leg: No edema  Neurological:      Mental Status: She is alert  Mental status is at baseline  Gait: Gait abnormal (using cane to ambulate with)     Psychiatric:         Mood and Affect: Mood normal          Behavior: Behavior normal        Lab Results   Component Value Date    BUN 13 05/08/2023    CALCIUM 8 4 05/08/2023     05/08/2023    CO2 24 05/08/2023    CREATININE 0 70 05/08/2023    GLUC 85 05/08/2023    K 4 0 05/08/2023    SODIUM 140 05/08/2023     Lab Results   Component Value Date    HCT 43 9 05/08/2023    HGB 14 4 05/08/2023    MCV 95 05/08/2023     05/08/2023    WBC 7 10 05/08/2023         Abdirahman Frorest DO

## 2023-07-08 ENCOUNTER — APPOINTMENT (EMERGENCY)
Dept: CT IMAGING | Facility: HOSPITAL | Age: 88
End: 2023-07-08
Payer: MEDICARE

## 2023-07-08 ENCOUNTER — HOSPITAL ENCOUNTER (EMERGENCY)
Facility: HOSPITAL | Age: 88
Discharge: HOME | End: 2023-07-08
Attending: EMERGENCY MEDICINE
Payer: MEDICARE

## 2023-07-08 VITALS
HEART RATE: 73 BPM | TEMPERATURE: 98.2 F | WEIGHT: 142.42 LBS | HEIGHT: 66 IN | OXYGEN SATURATION: 98 % | RESPIRATION RATE: 18 BRPM | BODY MASS INDEX: 22.89 KG/M2 | DIASTOLIC BLOOD PRESSURE: 89 MMHG | SYSTOLIC BLOOD PRESSURE: 188 MMHG

## 2023-07-08 DIAGNOSIS — G20 PARKINSON DISEASE (HCC): ICD-10-CM

## 2023-07-08 DIAGNOSIS — R26.2 AMBULATORY DYSFUNCTION: Primary | ICD-10-CM

## 2023-07-08 DIAGNOSIS — I10 HYPERTENSION, UNSPECIFIED TYPE: ICD-10-CM

## 2023-07-08 LAB
ANION GAP SERPL CALCULATED.3IONS-SCNC: 5 MMOL/L
BASOPHILS # BLD AUTO: 0.02 THOUSANDS/ÂΜL (ref 0–0.1)
BASOPHILS NFR BLD AUTO: 0 % (ref 0–1)
BUN SERPL-MCNC: 13 MG/DL (ref 5–25)
CALCIUM SERPL-MCNC: 8.7 MG/DL (ref 8.4–10.2)
CHLORIDE SERPL-SCNC: 108 MMOL/L (ref 96–108)
CO2 SERPL-SCNC: 26 MMOL/L (ref 21–32)
CREAT SERPL-MCNC: 0.62 MG/DL (ref 0.6–1.3)
EOSINOPHIL # BLD AUTO: 0.13 THOUSAND/ÂΜL (ref 0–0.61)
EOSINOPHIL NFR BLD AUTO: 2 % (ref 0–6)
ERYTHROCYTE [DISTWIDTH] IN BLOOD BY AUTOMATED COUNT: 13.4 % (ref 11.6–15.1)
GFR SERPL CREATININE-BSD FRML MDRD: 75 ML/MIN/1.73SQ M
GLUCOSE SERPL-MCNC: 90 MG/DL (ref 65–140)
HCT VFR BLD AUTO: 45.6 % (ref 34.8–46.1)
HGB BLD-MCNC: 14.8 G/DL (ref 11.5–15.4)
IMM GRANULOCYTES # BLD AUTO: 0.01 THOUSAND/UL (ref 0–0.2)
IMM GRANULOCYTES NFR BLD AUTO: 0 % (ref 0–2)
LYMPHOCYTES # BLD AUTO: 1.74 THOUSANDS/ÂΜL (ref 0.6–4.47)
LYMPHOCYTES NFR BLD AUTO: 30 % (ref 14–44)
MCH RBC QN AUTO: 31.6 PG (ref 26.8–34.3)
MCHC RBC AUTO-ENTMCNC: 32.5 G/DL (ref 31.4–37.4)
MCV RBC AUTO: 97 FL (ref 82–98)
MONOCYTES # BLD AUTO: 0.47 THOUSAND/ÂΜL (ref 0.17–1.22)
MONOCYTES NFR BLD AUTO: 8 % (ref 4–12)
NEUTROPHILS # BLD AUTO: 3.36 THOUSANDS/ÂΜL (ref 1.85–7.62)
NEUTS SEG NFR BLD AUTO: 60 % (ref 43–75)
NRBC BLD AUTO-RTO: 0 /100 WBCS
PLATELET # BLD AUTO: 246 THOUSANDS/UL (ref 149–390)
PMV BLD AUTO: 10.1 FL (ref 8.9–12.7)
POTASSIUM SERPL-SCNC: 4 MMOL/L (ref 3.5–5.3)
RBC # BLD AUTO: 4.69 MILLION/UL (ref 3.81–5.12)
SODIUM SERPL-SCNC: 139 MMOL/L (ref 135–147)
WBC # BLD AUTO: 5.73 THOUSAND/UL (ref 4.31–10.16)

## 2023-07-08 PROCEDURE — 99285 EMERGENCY DEPT VISIT HI MDM: CPT

## 2023-07-08 PROCEDURE — 85025 COMPLETE CBC W/AUTO DIFF WBC: CPT | Performed by: EMERGENCY MEDICINE

## 2023-07-08 PROCEDURE — 80048 BASIC METABOLIC PNL TOTAL CA: CPT | Performed by: EMERGENCY MEDICINE

## 2023-07-08 PROCEDURE — 93005 ELECTROCARDIOGRAM TRACING: CPT

## 2023-07-08 PROCEDURE — 70450 CT HEAD/BRAIN W/O DYE: CPT

## 2023-07-08 PROCEDURE — 36415 COLL VENOUS BLD VENIPUNCTURE: CPT | Performed by: EMERGENCY MEDICINE

## 2023-07-08 RX ORDER — AMLODIPINE BESYLATE 2.5 MG/1
2.5 TABLET ORAL ONCE
Status: DISCONTINUED | OUTPATIENT
Start: 2023-07-08 | End: 2023-07-08

## 2023-07-08 RX ORDER — AMLODIPINE BESYLATE 2.5 MG/1
2.5 TABLET ORAL DAILY
Qty: 7 TABLET | Refills: 0 | Status: SHIPPED | OUTPATIENT
Start: 2023-07-08 | End: 2023-07-12 | Stop reason: SDUPTHER

## 2023-07-08 RX ORDER — AMLODIPINE BESYLATE 2.5 MG/1
2.5 TABLET ORAL ONCE
Status: COMPLETED | OUTPATIENT
Start: 2023-07-08 | End: 2023-07-08

## 2023-07-08 RX ADMIN — AMLODIPINE BESYLATE 2.5 MG: 2.5 TABLET ORAL at 19:33

## 2023-07-08 NOTE — ED PROCEDURE NOTE
PROCEDURE  ECG 12 Lead Documentation Only    Date/Time: 7/8/2023 3:21 PM    Performed by: Hazel Rebollar MD  Authorized by: Hazel Rebollar MD    Indications / Diagnosis:  LIGHTHEADEDNESS   ECG reviewed by me, the ED Provider: yes    Patient location:  ED and bedside  Previous ECG:     Previous ECG:  Compared to current    Comparison ECG info:  5/7/23- NO SIGN CHANGES    Similarity:  No change    Comparison to cardiac monitor: Yes    Interpretation:     Interpretation: non-specific    Rate:     ECG rate:  71    ECG rate assessment: normal    Rhythm:     Rhythm: sinus rhythm    Ectopy:     Ectopy: none    QRS:     QRS axis:  Normal    QRS intervals:  Normal  Conduction:     Conduction: normal    ST segments:     ST segments:  Normal  T waves:     T waves: flattening      Flattening:  III and V1  Q waves:     Q waves:  V1 and V2  Other findings:     Other findings: poor R wave progression and U wave    Comments:      NO ECG SIGNS OF ISCHEMIA/ INJURY / R HEART STRAIN / Jagruti Rollins MD  07/08/23 1116

## 2023-07-08 NOTE — ED PROVIDER NOTES
History  Chief Complaint   Patient presents with   • Weakness - Generalized     Pt here from [facility] d/t increased weakness. Patient has been feeling very weak on her feet since Wednesday. Normally walks fine with walker but now is feeling wobbly on her feet, patient has parkinsons . No falls. 80 old female presented for evaluation of generalized weakness. Patient is a habitant of [facility] assisted  living facility. Patient reported she has been feeling weak for the past 4 days. Patient normally use a walker due to Parkinson disease however She reports Inability to use her Walker due to Weakness. She described her walk as more wobbly. Patient denied shortness of Breath, Chest Pain, Nausea, Vomiting, Diarrhea, Fever or Any Constitutional Symptoms. Patient is seen at Bedside in No Acute Distress. Prior to Admission Medications   Prescriptions Last Dose Informant Patient Reported? Taking?    Diclofenac Sodium (VOLTAREN) 1 %   No No   Sig: Apply 2 g topically every 12 (twelve) hours as needed (left shoulder pain/OA)   acetaminophen (TYLENOL) 325 mg tablet   No No   Sig: Take 3 tablets (975 mg total) by mouth every 8 (eight) hours   calcium citrate-vitamin D (CITRACAL+D) 315-200 MG-UNIT per tablet  Self Yes No   Sig: Take 1 tablet by mouth daily   carbidopa-levodopa (SINEMET)  mg per tablet   No No   Sig: Take 1 tablet by mouth 3 (three) times a day   chlorhexidine (PERIDEX) 0.12 % solution   Yes No   Sig: Use as directed    cholecalciferol (VITAMIN D3) 1,000 units tablet  Self Yes No   Sig: Take 3,000 Units by mouth daily    diltiazem (CARDIZEM) 30 mg tablet   No No   Sig: Take 1 tablet (30 mg total) by mouth 2 (two) times a day   escitalopram (LEXAPRO) 10 mg tablet   Yes No   Sig: Take 1 tablet (10 mg total) by mouth daily at bedtime   traMADol (ULTRAM) 50 mg tablet   No No   Sig: TAKE 1 TABLET BY MOUTH EVERY MORNING TAKE 1 TABLET BY MOUTH EVERY EVENING OKAY TO TAKE 1 TABLET MID-DAY FOR PAIN IF NEEDED      Facility-Administered Medications: None       Past Medical History:   Diagnosis Date   • Anxiety    • Arthritis    • Gastroparesis    • History of atherosclerosis    • History of breast cancer    • History of osteoarthritis    • Long term use of drug    • Malignant neoplasm of breast ([address]) 04/11/2011    left w/mastectomy; age 80   • Osteomalacia        Past Surgical History:   Procedure Laterality Date   • BREAST BIOPSY Left 04/11/2011    u/s core-positive   • HYSTERECTOMY  1974    age 52   • MASTECTOMY Left 05/04/2011    malignant       Family History   Problem Relation Age of Onset   • Coronary artery disease Mother    • Coronary artery disease Family    • Hyperlipidemia Family    • Osteoarthritis Family    • Lung cancer Sister 48     I have reviewed and agree with the history as documented. E-Cigarette/Vaping   • E-Cigarette Use Never User      E-Cigarette/Vaping Substances   • Nicotine No    • THC No    • CBD No    • Flavoring No    • Other No    • Unknown No      Social History     Tobacco Use   • Smoking status: Never   • Smokeless tobacco: Never   Vaping Use   • Vaping Use: Never used   Substance Use Topics   • Alcohol use: Yes     Comment: Social drinker   • Drug use: No        Review of Systems   Constitutional: Negative for chills and fever. HENT: Negative for ear pain and sore throat. Eyes: Negative for pain and visual disturbance. Respiratory: Negative for cough and shortness of breath. Cardiovascular: Negative for chest pain and palpitations. Gastrointestinal: Negative for abdominal pain and vomiting. Genitourinary: Negative for dysuria and hematuria. Musculoskeletal: Positive for gait problem. Negative for arthralgias and back pain. Skin: Negative for color change and rash. Neurological: Positive for weakness. Negative for seizures and syncope. All other systems reviewed and are negative.       Physical Exam  ED Triage Vitals   Temperature Pulse Respirations Blood Pressure SpO2   07/08/23 1242 07/08/23 1242 07/08/23 1242 07/08/23 1242 07/08/23 1242   (!) 97.4 °F (36.3 °C) 72 18 (!) 212/104 94 %      Temp Source Heart Rate Source Patient Position - Orthostatic VS BP Location FiO2 (%)   07/08/23 1515 07/08/23 1515 07/08/23 1515 07/08/23 1515 --   Oral Monitor Sitting Right arm       Pain Score       07/08/23 1515       No Pain             Orthostatic Vital Signs  Vitals:    07/08/23 1730 07/08/23 1800 07/08/23 1830 07/08/23 1933   BP: (!) 184/87 (!) 200/86 (!) 206/100 (!) 188/89   Pulse: 71 72 73    Patient Position - Orthostatic VS: Sitting Sitting Sitting        Physical Exam  Vitals and nursing note reviewed. Constitutional:       General: She is not in acute distress. Appearance: She is well-developed. HENT:      Head: Normocephalic and atraumatic. Eyes:      Conjunctiva/sclera: Conjunctivae normal.   Cardiovascular:      Rate and Rhythm: Normal rate and regular rhythm. Heart sounds: No murmur heard. Pulmonary:      Effort: Pulmonary effort is normal. No respiratory distress. Breath sounds: Normal breath sounds. Abdominal:      Palpations: Abdomen is soft. Tenderness: There is no abdominal tenderness. Musculoskeletal:         General: No swelling. Cervical back: Neck supple. Skin:     General: Skin is warm and dry. Capillary Refill: Capillary refill takes less than 2 seconds. Neurological:      General: No focal deficit present. Mental Status: She is alert and oriented to person, place, and time.    Psychiatric:         Mood and Affect: Mood normal.         ED Medications  Medications   amLODIPine (NORVASC) tablet 2.5 mg (2.5 mg Oral Given 7/8/23 1933)       Diagnostic Studies  Results Reviewed     Procedure Component Value Units Date/Time    Basic metabolic panel [[id number]] Collected: 07/08/23 1431    Lab Status: Final result Specimen: Blood from Arm, Right Updated: 07/08/23 1453     Sodium 139 mmol/L Potassium 4.0 mmol/L      Chloride 108 mmol/L      CO2 26 mmol/L      ANION GAP 5 mmol/L      BUN 13 mg/dL      Creatinine 0.62 mg/dL      Glucose 90 mg/dL      Calcium 8.7 mg/dL      eGFR 75 ml/min/1.73sq m     Narrative:      National Kidney Disease Foundation guidelines for Chronic Kidney Disease (CKD):   •  Stage 1 with normal or high GFR (GFR > 90 mL/min/1.73 square meters)  •  Stage 2 Mild CKD (GFR = 60-89 mL/min/1.73 square meters)  •  Stage 3A Moderate CKD (GFR = 45-59 mL/min/1.73 square meters)  •  Stage 3B Moderate CKD (GFR = 30-44 mL/min/1.73 square meters)  •  Stage 4 Severe CKD (GFR = 15-29 mL/min/1.73 square meters)  •  Stage 5 End Stage CKD (GFR <15 mL/min/1.73 square meters)  Note: GFR calculation is accurate only with a steady state creatinine    CBC and differential [[id number]] Collected: 07/08/23 1413    Lab Status: Final result Specimen: Blood from Arm, Right Updated: 07/08/23 1420     WBC 5.73 Thousand/uL      RBC 4.69 Million/uL      Hemoglobin 14.8 g/dL      Hematocrit 45.6 %      MCV 97 fL      MCH 31.6 pg      MCHC 32.5 g/dL      RDW 13.4 %      MPV 10.1 fL      Platelets 505 Thousands/uL      nRBC 0 /100 WBCs      Neutrophils Relative 60 %      Immat GRANS % 0 %      Lymphocytes Relative 30 %      Monocytes Relative 8 %      Eosinophils Relative 2 %      Basophils Relative 0 %      Neutrophils Absolute 3.36 Thousands/µL      Immature Grans Absolute 0.01 Thousand/uL      Lymphocytes Absolute 1.74 Thousands/µL      Monocytes Absolute 0.47 Thousand/µL      Eosinophils Absolute 0.13 Thousand/µL      Basophils Absolute 0.02 Thousands/µL                  CT head without contrast   Final Result by Lorna Wharton MD (07/08 1722)      No acute intracranial abnormality. Chronic microangiopathic changes.                   Workstation performed: [id number]               Procedures  Procedures      ED Course  ED Course as of 07/08/23 2208   Sat Jul 08, 2023   5781 Basic metabolic panel  Wnl, no GERALD normal anion gap   1456 CBC and differential  Wnl,   18 80year-old female presents for evaluation of generalized weakness of 4 days duration getting worse. Ordered CBC ,CMP, head CT   1612 When asked to walk, patient had difficulty walking she was also noted to have a left foot drop. This could be a sign of worsening of Parkinson disease. This was explained to patient and her daughter at bedside. 1814 CT head without contrast  No acute intracranial abnormality. No intracranial mass. Chronic microangiopathic change. [phone] Patient was able to walk with a walker in the ED baseline. Patient reported being comfortable going back home. Based on negative test results, patient discharged back to nursing home. SBIRT 22yo+    Flowsheet Row Most Recent Value   Initial Alcohol Screen: US AUDIT-C     1. How often do you have a drink containing alcohol? 0 Filed at: 07/08/2023 1524   2. How many drinks containing alcohol do you have on a typical day you are drinking? 0 Filed at: 07/08/2023 1524   3a. Male UNDER 65: How often do you have five or more drinks on one occasion? 0 Filed at: 07/08/2023 1524   3b. FEMALE Any Age, or MALE 65+: How often do you have 4 or more drinks on one occassion? 0 Filed at: 07/08/2023 1524   Audit-C Score 0 Filed at: 07/08/2023 1524   TAMI: How many times in the past year have you. .. Used an illegal drug or used a prescription medication for non-medical reasons? Never Filed at: 07/08/2023 1524                Medical Decision Making  23-year-old female presents for evaluation of generalized weakness of 4 days duration getting worse. Ordered CBC ,CMP, head CT. Basic metabolic panel Wnl, no GERALD normal anion gap  CBC and differential Wnl,    When asked to walk, patient had difficulty walking she was also noted to have a left foot drop. This could be a sign of worsening of Parkinson disease. This was explained to patient and her daughter at bedside.   CT head without contrast showed No acute intracranial abnormality. No intracranial mass. Chronic microangiopathic change present. Differential diagnosis include worsening Parkinson disease, dehydration, electrolyte abnormality, intracranial hemorrhage among others    Patient was able to walk with a walker in the ED baseline. Patient reported being comfortable going back home. Based on negative test results, patient discharged back to nursing home. Ambulatory dysfunction: acute illness or injury  Hypertension, unspecified type: acute illness or injury  Parkinson disease ([address]): chronic illness or injury  Amount and/or Complexity of Data Reviewed  Labs: ordered. Decision-making details documented in ED Course. Radiology: ordered. Decision-making details documented in ED Course. Risk  Prescription drug management. Disposition  Final diagnoses:   Ambulatory dysfunction   Hypertension, unspecified type   Parkinson disease ([address])     Time reflects when diagnosis was documented in both MDM as applicable and the Disposition within this note     Time User Action Codes Description Comment    7/8/2023  6:51 PM Thomasena Quiet Add [R26.2] Ambulatory dysfunction     7/8/2023  6:51 PM Manuel GARCIA Add [I10] Hypertension, unspecified type     7/8/2023  7:15 PM Thomasena Quiet Add [G20] Parkinson disease [facility])       ED Disposition     ED Disposition   Discharge    Condition   Stable    Date/Time   Sat Jul 8, 2023  6:51 PM    Comment   Karolyn Pearce discharge to home/self care.                Follow-up Information    None         Discharge Medication List as of 7/8/2023  7:20 PM      START taking these medications    Details   amLODIPine (Norvasc) 2.5 mg tablet Take 1 tablet (2.5 mg total) by mouth daily for 7 doses, Starting Sat 7/8/2023, Until Sat 7/15/2023, Normal         CONTINUE these medications which have NOT CHANGED    Details   acetaminophen (TYLENOL) 325 mg tablet Take 3 tablets (975 mg total) by mouth every 8 (eight) hours, Starting Mon 5/8/2023, No Print      calcium citrate-vitamin D (CITRACAL+D) 315-200 MG-UNIT per tablet Take 1 tablet by mouth daily, Historical Med      carbidopa-levodopa (SINEMET)  mg per tablet Take 1 tablet by mouth 3 (three) times a day, Starting Mon 9/26/2022, Normal      chlorhexidine (PERIDEX) 0.12 % solution Use as directed , Starting Wed 3/6/2019, Historical Med      cholecalciferol (VITAMIN D3) 1,000 units tablet Take 3,000 Units by mouth daily , Historical Med      Diclofenac Sodium (VOLTAREN) 1 % Apply 2 g topically every 12 (twelve) hours as needed (left shoulder pain/OA), Starting Wed 10/12/2022, Normal      diltiazem (CARDIZEM) 30 mg tablet Take 1 tablet (30 mg total) by mouth 2 (two) times a day, Starting Fri 8/19/2022, Normal      escitalopram (LEXAPRO) 10 mg tablet Take 1 tablet (10 mg total) by mouth daily at bedtime, Starting Wed 10/12/2022, Historical Med      traMADol (ULTRAM) 50 mg tablet TAKE 1 TABLET BY MOUTH EVERY MORNING TAKE 1 TABLET BY MOUTH EVERY EVENING OKAY TO TAKE 1 TABLET MID-DAY FOR PAIN IF NEEDED, Normal           No discharge procedures on file. PDMP Review       Value Time User    PDMP Reviewed  Yes 6/5/2023  3:37 PM Wiliam Bess DO           ED Provider  Attending physically available and evaluated Moose Farah. I managed the patient along with the ED Attending.     Electronically Signed by         Estevan Wayne MD  07/08/23 8146

## 2023-07-08 NOTE — DISCHARGE INSTRUCTIONS
DIAGNOSIS: PARKINSON DISEASE- AMBULATORY DYSFUNCTION- ELEVATED BLOOD PRESSURE  IN /100     - PLEASE START BLOOD PRESSURE MEDICATION AMLODIPINE 2.5 MG TABLET ONCE A DAY     -  PLEASE RETURN TO  THE ER FOR ANY  FALLING/ ANY INCREASING DIFFICULTY WITH BALANCE

## 2023-07-10 ENCOUNTER — APPOINTMENT (EMERGENCY)
Dept: RADIOLOGY | Facility: HOSPITAL | Age: 88
End: 2023-07-10
Payer: MEDICARE

## 2023-07-10 ENCOUNTER — HOSPITAL ENCOUNTER (EMERGENCY)
Facility: HOSPITAL | Age: 88
Discharge: HOME | End: 2023-07-10
Attending: EMERGENCY MEDICINE
Payer: MEDICARE

## 2023-07-10 ENCOUNTER — TRANSITIONAL CARE MANAGEMENT (OUTPATIENT)
Dept: INTERNAL MEDICINE CLINIC | Facility: CLINIC | Age: 88
End: 2023-07-10

## 2023-07-10 ENCOUNTER — OFFICE VISIT (OUTPATIENT)
Dept: INTERNAL MEDICINE CLINIC | Facility: CLINIC | Age: 88
End: 2023-07-10
Payer: MEDICARE

## 2023-07-10 VITALS
TEMPERATURE: 97.6 F | HEART RATE: 92 BPM | OXYGEN SATURATION: 93 % | RESPIRATION RATE: 20 BRPM | DIASTOLIC BLOOD PRESSURE: 96 MMHG | SYSTOLIC BLOOD PRESSURE: 113 MMHG

## 2023-07-10 VITALS
TEMPERATURE: 97.8 F | HEART RATE: 73 BPM | OXYGEN SATURATION: 99 % | BODY MASS INDEX: 21.89 KG/M2 | SYSTOLIC BLOOD PRESSURE: 128 MMHG | WEIGHT: 135.6 LBS | DIASTOLIC BLOOD PRESSURE: 60 MMHG

## 2023-07-10 DIAGNOSIS — R53.1 WEAKNESS: Primary | ICD-10-CM

## 2023-07-10 DIAGNOSIS — Z09 HOSPITAL DISCHARGE FOLLOW-UP: ICD-10-CM

## 2023-07-10 DIAGNOSIS — I70.90 ATHEROSCLEROSIS: Chronic | ICD-10-CM

## 2023-07-10 DIAGNOSIS — I10 PRIMARY HYPERTENSION: ICD-10-CM

## 2023-07-10 DIAGNOSIS — G20 PARKINSON'S DISEASE (HCC): Chronic | ICD-10-CM

## 2023-07-10 DIAGNOSIS — I95.1 ORTHOSTATIC HYPOTENSION: ICD-10-CM

## 2023-07-10 LAB
ANION GAP SERPL CALCULATED.3IONS-SCNC: 6 MMOL/L
ATRIAL RATE: 71 BPM
BACTERIA UR QL AUTO: ABNORMAL /HPF
BASOPHILS # BLD AUTO: 0.02 THOUSANDS/ÂΜL (ref 0–0.1)
BASOPHILS NFR BLD AUTO: 0 % (ref 0–1)
BILIRUB UR QL STRIP: NEGATIVE
BUN SERPL-MCNC: 18 MG/DL (ref 5–25)
CALCIUM SERPL-MCNC: 9.4 MG/DL (ref 8.3–10.1)
CHLORIDE SERPL-SCNC: 109 MMOL/L (ref 96–108)
CLARITY UR: CLEAR
CO2 SERPL-SCNC: 26 MMOL/L (ref 21–32)
COLOR UR: COLORLESS
CREAT SERPL-MCNC: 0.8 MG/DL (ref 0.6–1.3)
EOSINOPHIL # BLD AUTO: 0.11 THOUSAND/ÂΜL (ref 0–0.61)
EOSINOPHIL NFR BLD AUTO: 2 % (ref 0–6)
ERYTHROCYTE [DISTWIDTH] IN BLOOD BY AUTOMATED COUNT: 13.4 % (ref 11.6–15.1)
GFR SERPL CREATININE-BSD FRML MDRD: 61 ML/MIN/1.73SQ M
GLUCOSE SERPL-MCNC: 108 MG/DL (ref 65–140)
GLUCOSE UR STRIP-MCNC: NEGATIVE MG/DL
HCT VFR BLD AUTO: 43.6 % (ref 34.8–46.1)
HGB BLD-MCNC: 14.2 G/DL (ref 11.5–15.4)
HGB UR QL STRIP.AUTO: NEGATIVE
IMM GRANULOCYTES # BLD AUTO: 0.02 THOUSAND/UL (ref 0–0.2)
IMM GRANULOCYTES NFR BLD AUTO: 0 % (ref 0–2)
KETONES UR STRIP-MCNC: NEGATIVE MG/DL
LEUKOCYTE ESTERASE UR QL STRIP: ABNORMAL
LYMPHOCYTES # BLD AUTO: 1.8 THOUSANDS/ÂΜL (ref 0.6–4.47)
LYMPHOCYTES NFR BLD AUTO: 29 % (ref 14–44)
MCH RBC QN AUTO: 31.3 PG (ref 26.8–34.3)
MCHC RBC AUTO-ENTMCNC: 32.6 G/DL (ref 31.4–37.4)
MCV RBC AUTO: 96 FL (ref 82–98)
MONOCYTES # BLD AUTO: 0.54 THOUSAND/ÂΜL (ref 0.17–1.22)
MONOCYTES NFR BLD AUTO: 9 % (ref 4–12)
NEUTROPHILS # BLD AUTO: 3.63 THOUSANDS/ÂΜL (ref 1.85–7.62)
NEUTS SEG NFR BLD AUTO: 60 % (ref 43–75)
NITRITE UR QL STRIP: NEGATIVE
NON-SQ EPI CELLS URNS QL MICRO: ABNORMAL /HPF
NRBC BLD AUTO-RTO: 0 /100 WBCS
P AXIS: 47 DEGREES
PH UR STRIP.AUTO: 6 [PH]
PLATELET # BLD AUTO: 246 THOUSANDS/UL (ref 149–390)
PMV BLD AUTO: 9.7 FL (ref 8.9–12.7)
POTASSIUM SERPL-SCNC: 4.1 MMOL/L (ref 3.5–5.3)
PR INTERVAL: 164 MS
PROT UR STRIP-MCNC: NEGATIVE MG/DL
QRS AXIS: 16 DEGREES
QRSD INTERVAL: 88 MS
QT INTERVAL: 394 MS
QTC INTERVAL: 428 MS
RBC # BLD AUTO: 4.53 MILLION/UL (ref 3.81–5.12)
RBC #/AREA URNS AUTO: ABNORMAL /HPF
SODIUM SERPL-SCNC: 141 MMOL/L (ref 135–147)
SP GR UR STRIP.AUTO: 1.01 (ref 1–1.03)
T WAVE AXIS: 44 DEGREES
TSH SERPL DL<=0.05 MIU/L-ACNC: 1.11 UIU/ML (ref 0.45–4.5)
UROBILINOGEN UR STRIP-ACNC: <2 MG/DL
VENTRICULAR RATE: 71 BPM
WBC # BLD AUTO: 6.12 THOUSAND/UL (ref 4.31–10.16)
WBC #/AREA URNS AUTO: ABNORMAL /HPF

## 2023-07-10 PROCEDURE — 99284 EMERGENCY DEPT VISIT MOD MDM: CPT | Performed by: EMERGENCY MEDICINE

## 2023-07-10 PROCEDURE — 96361 HYDRATE IV INFUSION ADD-ON: CPT

## 2023-07-10 PROCEDURE — 96360 HYDRATION IV INFUSION INIT: CPT

## 2023-07-10 PROCEDURE — 93010 ELECTROCARDIOGRAM REPORT: CPT | Performed by: INTERNAL MEDICINE

## 2023-07-10 PROCEDURE — 80048 BASIC METABOLIC PNL TOTAL CA: CPT

## 2023-07-10 PROCEDURE — 93005 ELECTROCARDIOGRAM TRACING: CPT

## 2023-07-10 PROCEDURE — 36415 COLL VENOUS BLD VENIPUNCTURE: CPT

## 2023-07-10 PROCEDURE — 85025 COMPLETE CBC W/AUTO DIFF WBC: CPT

## 2023-07-10 PROCEDURE — 84443 ASSAY THYROID STIM HORMONE: CPT | Performed by: EMERGENCY MEDICINE

## 2023-07-10 PROCEDURE — 81001 URINALYSIS AUTO W/SCOPE: CPT

## 2023-07-10 PROCEDURE — 99285 EMERGENCY DEPT VISIT HI MDM: CPT

## 2023-07-10 PROCEDURE — 71046 X-RAY EXAM CHEST 2 VIEWS: CPT

## 2023-07-10 PROCEDURE — 99496 TRANSJ CARE MGMT HIGH F2F 7D: CPT | Performed by: INTERNAL MEDICINE

## 2023-07-10 RX ADMIN — SODIUM CHLORIDE 500 ML: 0.9 INJECTION, SOLUTION INTRAVENOUS at 17:02

## 2023-07-10 RX ADMIN — CARBIDOPA AND LEVODOPA 1 TABLET: 25; 100 TABLET ORAL at 17:47

## 2023-07-10 NOTE — ED ATTENDING ATTESTATION
7/8/2023  I, Chela Pulido MD, saw and evaluated the patient. I have discussed the patient with the resident/non-physician practitioner and agree with the resident's/non-physician practitioner's findings, Plan of Care, and MDM as documented in the resident's/non-physician practitioner's note, except where noted. All available labs and Radiology studies were reviewed. I was present for key portions of any procedure(s) performed by the resident/non-physician practitioner and I was immediately available to provide assistance. At this point I agree with the current assessment done in the Emergency Department.   I have conducted an independent evaluation of this patient a history and physical is as follows:see h and p above     ED Course  ED Course as of 07/10/23 1631   Sat Jul 08, 2023   1405 Er md note- pt has been off cardizem for over 1 yeaR    1417 - ER MD NOTE- 5/23 LABS/ D/C SUMMARY REVIEWED BY ER MD    80 Er md note-  pt- re-eval  resting in nad- sbp 80 -- daughter and son in law has left --- pt aware of pending  formal head ct read  and will re-eval and repeat ambulation    1837 Er md note-  pt tolerated ambulation  with walker at baseline--- pt states feels comfortable going to chronic care facility -will call pt kt briggs --    36 -er md note- pt daughter brigitte contacted ---  aware of  neg er workup- and persistently elevated bp-- and pt feels comfortable going home with ambulation trial with walker in er-- er md did contact [facility] -- about concerns about pt  being able to remain in independent living and  left message with director  to have pt evaluated   for possible assisted living - which daughter brigitte was ok with    5 - er md note- er md did tiger text - pt pmd shaila Downing aboUT elevaTED BLOOD PRESSURE HE WILL FOLLOWUP WITH HER IN OFFICE-- STATES SHE DOES TEND TO [id number] [address]  Time  Procedures

## 2023-07-10 NOTE — ED ATTENDING ATTESTATION
7/10/2023  I, Cherry Samayoa MD, saw and evaluated the patient. I have discussed the patient with the resident/non-physician practitioner and agree with the resident's/non-physician practitioner's findings, Plan of Care, and MDM as documented in the resident's/non-physician practitioner's note, except where noted. All available labs and Radiology studies were reviewed. I was present for key portions of any procedure(s) performed by the resident/non-physician practitioner and I was immediately available to provide assistance. At this point I agree with the current assessment done in the Emergency Department.   I have conducted an independent evaluation of this patient a history and physical is as follows:  Patient presents for generalized weakness the patient was seen 2 days ago at the [facility] have a work-up including negative CAT of head negative lab work-up sent back to her nursing facility which is an assisted living scan for similar complaints  The patient followed up with her PCP today and was sent to the ER for continued symptoms of generalized weakness  Patient denies headache denies fever or chills denies cough denies shortness of breath denies chest pain or chest pressure denies abdominal pain denies vomiting or diarrhea denies melena or bright red blood per rectum she has no dysuria she does have some incontinence but this is a chronic problem  Patient does have history of Parkinson's disease she is on Sinemet she missed her afternoon dose  Exam pleasant well-appearing no acute distress HEENT exam is unremarkable mucous memories are moist sclera anicteric neck no JVD lungs clear heart regular no murmurs abdomen soft and nontender positive bowel sounds extremities nontender neurologic exam patient has a resting tremor face is symmetric other than some mild left facial droop which is a chronic problem  Motor strength intact and symmetric  Impression: Underlies weakness  ED Course Critical Care Time  Procedures

## 2023-07-10 NOTE — ED PROVIDER NOTES
History  Chief Complaint   Patient presents with   • Weakness - Generalized     Pt c/o generalized weakness x1 week, denies any pain, discharged from [facility] after having blood work and CT head      HPI see MDM    Prior to Admission Medications   Prescriptions Last Dose Informant Patient Reported? Taking? Diclofenac Sodium (VOLTAREN) 1 %   No No   Sig: Apply 2 g topically every 12 (twelve) hours as needed (left shoulder pain/OA)   acetaminophen (TYLENOL) 325 mg tablet   No No   Sig: Take 3 tablets (975 mg total) by mouth every 8 (eight) hours   calcium citrate-vitamin D (CITRACAL+D) 315-200 MG-UNIT per tablet  Self Yes No   Sig: Take 1 tablet by mouth daily   carbidopa-levodopa (SINEMET)  mg per tablet   No No   Sig: Take 1 tablet by mouth 3 (three) times a day   chlorhexidine (PERIDEX) 0.12 % solution   Yes No   Sig: Use as directed    cholecalciferol (VITAMIN D3) 1,000 units tablet  Self Yes No   Sig: Take 3,000 Units by mouth daily    escitalopram (LEXAPRO) 10 mg tablet   Yes No   Sig: Take 1 tablet (10 mg total) by mouth daily at bedtime      Facility-Administered Medications: None       Past Medical History:   Diagnosis Date   • Anxiety    • Arthritis    • Gastroparesis    • History of atherosclerosis    • History of breast cancer    • History of osteoarthritis    • Long term use of drug    • Malignant neoplasm of breast ([address]) 04/11/2011    left w/mastectomy; age 80   • Osteomalacia        Past Surgical History:   Procedure Laterality Date   • BREAST BIOPSY Left 04/11/2011    u/s core-positive   • HYSTERECTOMY  1974    age 52   • MASTECTOMY Left 05/04/2011    malignant       Family History   Problem Relation Age of Onset   • Coronary artery disease Mother    • Coronary artery disease Family    • Hyperlipidemia Family    • Osteoarthritis Family    • Lung cancer Sister 48     I have reviewed and agree with the history as documented.     E-Cigarette/Vaping   • E-Cigarette Use Never User E-Cigarette/Vaping Substances   • Nicotine No    • THC No    • CBD No    • Flavoring No    • Other No    • Unknown No      Social History     Tobacco Use   • Smoking status: Never   • Smokeless tobacco: Never   Vaping Use   • Vaping Use: Never used   Substance Use Topics   • Alcohol use: Yes     Comment: Social drinker   • Drug use: No        Review of Systems    Physical Exam  ED Triage Vitals [07/10/23 1556]   Temperature Pulse Respirations Blood Pressure SpO2   97.6 °F (36.4 °C) 71 18 (!) 174/84 95 %      Temp Source Heart Rate Source Patient Position - Orthostatic VS BP Location FiO2 (%)   Oral Monitor Lying Right arm --      Pain Score       No Pain             Orthostatic Vital Signs  Vitals:    07/10/23 1657 07/10/23 1800 07/10/23 1900 07/10/23 2000   BP: 152/80 169/83 163/90 113/96   Pulse: 71 76 88 92   Patient Position - Orthostatic VS: Standing - Orthostatic VS Lying Lying Lying       Physical Exam    ED Medications  Medications   sodium chloride 0.9 % bolus 500 mL (0 mL Intravenous Stopped 7/10/23 2117)   carbidopa-levodopa (SINEMET)  mg per tablet 1 tablet (1 tablet Oral Given 7/10/23 1747)       Diagnostic Studies  Results Reviewed     Procedure Component Value Units Date/Time    TSH [[id number]]  (Normal) Collected: 07/10/23 1649    Lab Status: Final result Specimen: Blood from Arm, Right Updated: 07/10/23 1932     TSH 3RD GENERATON 1.107 uIU/mL     Urine Microscopic [[id number]]  (Abnormal) Collected: 07/10/23 1843    Lab Status: Final result Specimen: Urine, Clean Catch Updated: 07/10/23 1925     RBC, UA None Seen /hpf      WBC, UA 4-10 /hpf      Epithelial Cells Occasional /hpf      Bacteria, UA Occasional /hpf     UA w Reflex to Microscopic w Reflex to Culture [[id number]]  (Abnormal) Collected: 07/10/23 1843    Lab Status: Final result Specimen: Urine, Clean Catch Updated: 07/10/23 1911     Color, UA Colorless     Clarity, UA Clear     Specific Gravity, UA 1.006     pH, UA 6.0 Leukocytes, UA Small     Nitrite, UA Negative     Protein, UA Negative mg/dl      Glucose, UA Negative mg/dl      Ketones, UA Negative mg/dl      Urobilinogen, UA <2.0 mg/dl      Bilirubin, UA Negative     Occult Blood, UA Negative    Basic metabolic panel [[id number]]  (Abnormal) Collected: 07/10/23 1649    Lab Status: Final result Specimen: Blood from Arm, Right Updated: 07/10/23 1718     Sodium 141 mmol/L      Potassium 4.1 mmol/L      Chloride 109 mmol/L      CO2 26 mmol/L      ANION GAP 6 mmol/L      BUN 18 mg/dL      Creatinine 0.80 mg/dL      Glucose 108 mg/dL      Calcium 9.4 mg/dL      eGFR 61 ml/min/1.73sq m     Narrative:      [place] guidelines for Chronic Kidney Disease (CKD):   •  Stage 1 with normal or high GFR (GFR > 90 mL/min/1.73 square meters)  •  Stage 2 Mild CKD (GFR = 60-89 mL/min/1.73 square meters)  •  Stage 3A Moderate CKD (GFR = 45-59 mL/min/1.73 square meters)  •  Stage 3B Moderate CKD (GFR = 30-44 mL/min/1.73 square meters)  •  Stage 4 Severe CKD (GFR = 15-29 mL/min/1.73 square meters)  •  Stage 5 End Stage CKD (GFR <15 mL/min/1.73 square meters)  Note: GFR calculation is accurate only with a steady state creatinine    CBC and differential [[id number]] Collected: 07/10/23 1649    Lab Status: Final result Specimen: Blood from Arm, Right Updated: 07/10/23 1657     WBC 6.12 Thousand/uL      RBC 4.53 Million/uL      Hemoglobin 14.2 g/dL      Hematocrit 43.6 %      MCV 96 fL      MCH 31.3 pg      MCHC 32.6 g/dL      RDW 13.4 %      MPV 9.7 fL      Platelets 774 Thousands/uL      nRBC 0 /100 WBCs      Neutrophils Relative 60 %      Immat GRANS % 0 %      Lymphocytes Relative 29 %      Monocytes Relative 9 %      Eosinophils Relative 2 %      Basophils Relative 0 %      Neutrophils Absolute 3.63 Thousands/µL      Immature Grans Absolute 0.02 Thousand/uL      Lymphocytes Absolute 1.80 Thousands/µL      Monocytes Absolute 0.54 Thousand/µL      Eosinophils Absolute 0.11 Thousand/µL      Basophils Absolute 0.02 Thousands/µL                  XR chest 2 views   Final Result by Yonis Mccauley MD (07/11 1960)      No acute cardiopulmonary disease. Workstation performed: [id number]               Procedures  Procedures      ED Course                                       MDM   Pt is a 49-year-old female presenting with generalized weakness and lightheadedness. Patient has history of Parkinson's and multiple other comorbidities. Lives in a independent living facility. He was [facility] at [facility] for similar complaints 2 days ago with extensive work-up that was negative. She went back to the PCP this morning for weakness and unable to ambulate and was sent here for further work-up. She denies any nausea, vomiting, chest pain, shortness of breath, abdominal pain, urinary symptoms, headache. On exam General: VSS, NAD, awake, alert. Talking normally. Head: Normocephalic, atraumatic, nontender. Eyes: EOM-No subconjunctival hemorrhages. ENT: Nose atraumatic. MMM  No malocclusion. No stridor. Normal phonation. No drooling. Normal swallowing. Neck: Trachea midline. No JVD. CV: RRR. Lungs: CTAB No tachypnea. No paradoxical motion. Abd: +BS, soft, NT/ND. No guarding/rigidity. MSK: Full ROM throughout. No lower extremity edema. Skin: Dry, intact. Neuro: AAOx3, GCS 15, CN II-XII grossly intact. Motor/sensory grossly intact. Psychiatric/Behavioral: mood/affect normal; behavior normal; thought content normal; judgement normal   Exam: deferred    Ddx: Worsening Parkinson's, age-related weakness, UTI, electrolyte disturbances, thyroid related issues of orthostatic    Plan: We will evaluate with basic labs, UA, TSH, EKG, orthostatic vitals, chest x-ray    Labs as interpreted by me unremarkable  EKG as interpreted by me This EKG was interpreted by me.  The EKG demonstrates Normal sinus rhythm, normal intervals and axis, normal QRS, no acute ST changes present. Imaging as interpreted by me chest x-ray unremarkable with no acute intrapulmonary pathology    ED Course: Patient had symptomatic relief with normal saline, work-up has been negative. Ambulatory dysfunction, weakness possibly secondary to worsening Parkinson's, orthostatic possibly secondary to Parkinson's as well. Patient was able to ambulate in the ED without difficulty and requested to be discharged. Discharged with outpatient follow-up and strict return precautions. Instructed to hydrate well and not to get out of the bed immediately. Final Dispo:   Pt is hemodynamically stable and clear for discharge with outpatient f/u with their PCP. Return precautions given pt verbalized understanding    Disposition  Final diagnoses:   Weakness   Orthostatic hypotension     Time reflects when diagnosis was documented in both MDM as applicable and the Disposition within this note     Time User Action Codes Description Comment    7/10/2023  8:50 PM Molly Weaver Add [R53.1] Weakness     7/10/2023  8:50 PM Raya Robert Add [I95.1] Orthostatic hypotension       ED Disposition     ED Disposition   Discharge    Condition   Stable    Date/Time   Mon Jul 10, 2023  8:14 PM    Comment   Juliet Jones discharge to home/self care. Follow-up Information     Follow up With Specialties Details Why [address] [place] [address],  Internal Medicine   [address] [place] [address]  [phone]            Discharge Medication List as of 7/10/2023  8:51 PM      CONTINUE these medications which have NOT CHANGED    Details   acetaminophen (TYLENOL) 325 mg tablet Take 3 tablets (975 mg total) by mouth every 8 (eight) hours, Starting Mon 5/8/2023, No Print      calcium citrate-vitamin D (CITRACAL+D) 315-200 MG-UNIT per tablet Take 1 tablet by mouth daily, Historical Med      carbidopa-levodopa (SINEMET)  mg per tablet Take 1 tablet by mouth 3 (three) times a day, Starting Mon 9/26/2022, Normal      chlorhexidine (PERIDEX) 0.12 % solution Use as directed , Starting Wed 3/6/2019, Historical Med      cholecalciferol (VITAMIN D3) 1,000 units tablet Take 3,000 Units by mouth daily , Historical Med      Diclofenac Sodium (VOLTAREN) 1 % Apply 2 g topically every 12 (twelve) hours as needed (left shoulder pain/OA), Starting Wed 10/12/2022, Normal      escitalopram (LEXAPRO) 10 mg tablet Take 1 tablet (10 mg total) by mouth daily at bedtime, Starting Wed 10/12/2022, Historical Med      amLODIPine (Norvasc) 2.5 mg tablet Take 1 tablet (2.5 mg total) by mouth daily for 7 doses, Starting Sat 7/8/2023, Until Sat 7/15/2023, Normal      traMADol (ULTRAM) 50 mg tablet TAKE 1 TABLET BY MOUTH EVERY MORNING TAKE 1 TABLET BY MOUTH EVERY EVENING OKAY TO TAKE 1 TABLET MID-DAY FOR PAIN IF NEEDED, Normal           No discharge procedures on file. PDMP Review       Value Time User    PDMP Reviewed  Yes 7/12/2023  2:02 PM Lola Plunkett DO           ED Provider  Attending physically available and evaluated Quang Larson. I managed the patient along with the ED Attending.     Electronically Signed by         Barbara Russ DO  07/13/23 2420

## 2023-07-10 NOTE — PROGRESS NOTES
Assessment & Plan     1. Weakness  -     Transfer to other facility    2. Hospital discharge follow-up    3. Primary hypertension    4. Atherosclerosis    5. Parkinson's disease ([address])    given trouble standing/walking and severe weakness will refer to [facility] ER for further evaluation and possible admission. Daughter is in agreement and thought the hospital should have kept her when she took Indigo Malhotra there 2 days ago. Subjective     Transitional Care Management Review:   Cholo Kerr is a 80 y.o. female here for TCM follow up. During the TCM phone call patient stated:  TCM Call     Date and time call was made  7/10/2023 11:44 AM    Hospital care reviewed  Records reviewed    Patient was hospitialized at  [facility]    Date of Admission  07/08/23    Date of discharge  07/08/23    Diagnosis  ambulatory dysfunction    Disposition  Home      TCM Call     Scheduled for follow up? Yes    I have advised the patient to call PCP with any new or worsening symptoms  [address]; Family; Friends    The type of support provided  Emotional; Physical    Do you have social support  Yes, as much as I need        HPI     Here for ER discharge follow up, Indigo Malhotra was brought here by her daughter. She is not feeling well and feels as if something is 'wrong'. Her BP is improved and on re-check by me. She is having trouble standing and walking today and felt a bit better yesterday but feels worse today. Daughter believes Indigo Malhotra is taking her medications properly but is not sure. Ida Malhotra lives in [place] and refuses to move to [facility] or NH level of care. She uses a walker to ambulate with but has trouble standing and walking due to weakness and feeling like she is going to fall. PHQ-9 score of zero. Review of Systems   Constitutional: Positive for fatigue. Negative for chills and fever. HENT: Negative for congestion. Eyes: Negative for redness.    Respiratory: Negative for shortness of breath. Cardiovascular: Negative for chest pain. Gastrointestinal: Negative for abdominal pain and diarrhea. Endocrine: Negative for polyuria. Genitourinary: Negative for difficulty urinating. Musculoskeletal: Positive for arthralgias, gait problem and myalgias. Skin: Negative for rash. Allergic/Immunologic: Negative for immunocompromised state. Neurological: Positive for tremors and weakness. Negative for dizziness and light-headedness. Psychiatric/Behavioral: Negative for sleep disturbance. The patient is nervous/anxious. Objective     /60 (BP Location: Right arm, Patient Position: Sitting)   Pulse 73   Temp 97.8 °F (36.6 °C)   Wt 61.5 kg (135 lb 9.6 oz)   LMP  (LMP Unknown)   SpO2 99%   BMI 21.89 kg/m²      Physical Exam  Vitals reviewed. Constitutional:       Appearance: She is ill-appearing. HENT:      Head: Normocephalic and atraumatic. Right Ear: Tympanic membrane normal.      Left Ear: Tympanic membrane normal.   Eyes:      Conjunctiva/sclera: Conjunctivae normal.   Cardiovascular:      Rate and Rhythm: Normal rate and regular rhythm. Heart sounds:      No gallop. Pulmonary:      Effort: Pulmonary effort is normal.      Breath sounds: No wheezing or rales. Abdominal:      General: Abdomen is flat. Bowel sounds are normal.      Palpations: Abdomen is soft. Tenderness: There is no abdominal tenderness. Musculoskeletal:      Cervical back: Neck supple. Right lower leg: No edema. Left lower leg: No edema. Neurological:      Mental Status: She is alert. Mental status is at baseline. Motor: Tremor (right hand) present. Gait: Gait abnormal (has trouble standing with walker). Psychiatric:         Mood and Affect: Mood is anxious. Mood is not depressed. Behavior: Behavior normal.      Comments:   And irritable       Medications have been reviewed by provider in current encounter    Gerber Jeffers DO

## 2023-07-11 NOTE — DISCHARGE INSTRUCTIONS
Follow-up with your neurologist for medication changes  Hydrate well and try to get up slowly from the bed and sit for 5 minutes before standing up. If you notice worsening symptoms come back to the ED.

## 2023-07-12 ENCOUNTER — TELEPHONE (OUTPATIENT)
Dept: INTERNAL MEDICINE CLINIC | Facility: CLINIC | Age: 88
End: 2023-07-12

## 2023-07-12 ENCOUNTER — TELEPHONE (OUTPATIENT)
Dept: NEUROLOGY | Facility: CLINIC | Age: 88
End: 2023-07-12

## 2023-07-12 DIAGNOSIS — I10 PRIMARY HYPERTENSION: Primary | ICD-10-CM

## 2023-07-12 DIAGNOSIS — I10 HYPERTENSION, UNSPECIFIED TYPE: ICD-10-CM

## 2023-07-12 DIAGNOSIS — M54.16 LUMBAR RADICULOPATHY: Chronic | ICD-10-CM

## 2023-07-12 LAB
ATRIAL RATE: 69 BPM
P AXIS: -21 DEGREES
PR INTERVAL: 122 MS
QRS AXIS: 45 DEGREES
QRSD INTERVAL: 86 MS
QT INTERVAL: 396 MS
QTC INTERVAL: 424 MS
T WAVE AXIS: 75 DEGREES
VENTRICULAR RATE: 69 BPM

## 2023-07-12 PROCEDURE — 93010 ELECTROCARDIOGRAM REPORT: CPT | Performed by: INTERNAL MEDICINE

## 2023-07-12 RX ORDER — AMLODIPINE BESYLATE 2.5 MG/1
2.5 TABLET ORAL DAILY
Qty: 90 TABLET | Refills: 1 | Status: SHIPPED | OUTPATIENT
Start: 2023-07-12

## 2023-07-12 RX ORDER — TRAMADOL HYDROCHLORIDE 50 MG/1
TABLET ORAL
Qty: 75 TABLET | Refills: 1 | Status: SHIPPED | OUTPATIENT
Start: 2023-07-12

## 2023-07-12 NOTE — TELEPHONE ENCOUNTER
Called pt daughter advised that the refills were sent. [facility] due [facility]  The amlodipine that Usmanmarybel gave her is starting to work and she is not as dizzy as she was

## 2023-07-12 NOTE — TELEPHONE ENCOUNTER
Hi, this is Oley Fuelling. I'm calling for my mother, William Perez, and I'm requesting a refill on two prescriptions. I had mentioned it to the doctor when we visited him on Monday, but he we went to the hospital then and I never got my refills before we left, and she's really in dire need of both of them. The amlodipine. The Silhouette 2.5 tabs. She only has for today and tomorrow, and the Tramadol, she only has enough until Sunday because she takes a half twice a day. So I was wondering if you could take care of that for me. She goes to [facility] on [address] [place] and that's where you could you could call it in for us. And [facility]. I don't know if there's anything else you need. My number is [phone]. That's my home number. My cell number is [id number]. I'm sorry, that's wrong, [phone]. So if you have a problem with either, please call me. Thank you very much.  Bye, bye.

## 2023-07-12 NOTE — TELEPHONE ENCOUNTER
Kt Rodriguez left voicemail at Neurology office requesting refills of amlodipine and tramadol. She is also requesting call back as she has questions for .     Attempted call to kt Rodriguez at [phone]. No answer. No option for voicemail.     - please have office staff assist.

## 2023-07-12 NOTE — TELEPHONE ENCOUNTER
Patient called to schedule.  Scheduled yearly follow up with Obdulia Banuelos in [place] per request.

## 2023-07-12 NOTE — TELEPHONE ENCOUNTER
Noted    Edson Go needs to stay well hydrated at all times    Recommend she see neurology to follow up as well    thanks

## 2023-07-12 NOTE — TELEPHONE ENCOUNTER
Daughter was called back a few mins ago    Nancie, can you help this patient get an appt with neurology?    She needs to see neuro for her parkinson's    thanks

## 2023-09-05 ENCOUNTER — TELEPHONE (OUTPATIENT)
Dept: INTERNAL MEDICINE CLINIC | Facility: CLINIC | Age: 88
End: 2023-09-05

## 2023-09-05 NOTE — TELEPHONE ENCOUNTER
Hi, I'm calling for Radha José. Her birthday is July 29th, 1925 and I want information about what kind of chair or wheelchair or transport chair I could get for her. And I thought maybe Doctor Michael Toussaint might be able to request something. She's 80years old and her walking is getting very bad. And in order to take her out, I need something to push her in. OK, my number is [phone]. Memo Larsen.  Thank you

## 2023-09-05 NOTE — TELEPHONE ENCOUNTER
I can order Desirae Moore and wheelchair to use    I recommend a physical therapy evaluation if Desirae Moore would like specific information on which type of wheelchair would suit her best    Let me know if she will do the PT evaluation or simply wants me to put in an order for a wheelchair to a medical supply pharmacy.     I believe, the medical supply pharmacy reaches to the patient or family to schedule delivery

## 2023-09-06 NOTE — TELEPHONE ENCOUNTER
She just got done with PT and does not want to go back she can barely move. But would like a wheelchair to use.  They suggested a transport chair but does not know what to get please advise

## 2023-09-26 DIAGNOSIS — I10 PRIMARY HYPERTENSION: ICD-10-CM

## 2023-09-26 DIAGNOSIS — G20.A1 PARKINSON'S DISEASE: Chronic | ICD-10-CM

## 2023-09-26 DIAGNOSIS — M54.16 LUMBAR RADICULOPATHY: Chronic | ICD-10-CM

## 2023-09-26 RX ORDER — AMLODIPINE BESYLATE 2.5 MG/1
2.5 TABLET ORAL DAILY
Qty: 90 TABLET | Refills: 1 | Status: SHIPPED | OUTPATIENT
Start: 2023-09-26

## 2023-09-26 RX ORDER — TRAMADOL HYDROCHLORIDE 50 MG/1
TABLET ORAL
Qty: 75 TABLET | Refills: 1 | Status: SHIPPED | OUTPATIENT
Start: 2023-09-26

## 2023-09-26 NOTE — TELEPHONE ENCOUNTER
Message was left for refills      Hi, this is [address] [place] [address]. I'm calling for Simon Palmer. Her birthday is July 29th, 1925. We have three prescriptions that need to be refilled. At right [facility] [address]. No, that's not right. [facility] on [address] [place]. These are the numbers number [id number] Tramadol 50MG Latosha Dopa Love Lava dopa number [id number] Now I'm sorry it's that's incorrect [id number] and that's Caradoc a 33961 tabs and the last one is [id number] Amlodipine Besylate 25MG tabs. She is completely out of the Tramadol and will need one for Saturday of this week. The others, she has just a couple extras. OK, thank you. Is there anything else? Please call [address] [place] [address], [phone]. Thank you.

## 2023-10-09 ENCOUNTER — TELEPHONE (OUTPATIENT)
Dept: OTHER | Facility: OTHER | Age: 88
End: 2023-10-09

## 2023-10-09 NOTE — TELEPHONE ENCOUNTER
Patient is calling regarding cancelling an appointment.     Date/Time:10- @ 13:30 pm    Patient was rescheduled: YES [] NO [x]    Patient requesting call back to reschedule: YES [] NO [x]

## 2023-10-20 ENCOUNTER — OFFICE VISIT (OUTPATIENT)
Dept: INTERNAL MEDICINE CLINIC | Facility: CLINIC | Age: 88
End: 2023-10-20

## 2023-10-20 VITALS
DIASTOLIC BLOOD PRESSURE: 74 MMHG | WEIGHT: 139 LBS | SYSTOLIC BLOOD PRESSURE: 127 MMHG | RESPIRATION RATE: 15 BRPM | BODY MASS INDEX: 22.44 KG/M2 | HEART RATE: 73 BPM | OXYGEN SATURATION: 97 %

## 2023-10-20 DIAGNOSIS — F41.8 DEPRESSION WITH ANXIETY: Chronic | ICD-10-CM

## 2023-10-20 DIAGNOSIS — M54.16 LUMBAR RADICULOPATHY: Chronic | ICD-10-CM

## 2023-10-20 DIAGNOSIS — G89.29 CHRONIC PAIN OF BOTH KNEES: ICD-10-CM

## 2023-10-20 DIAGNOSIS — M25.561 CHRONIC PAIN OF BOTH KNEES: ICD-10-CM

## 2023-10-20 DIAGNOSIS — S46.212A STRAIN OF LEFT BICEPS TENDON: ICD-10-CM

## 2023-10-20 DIAGNOSIS — I10 PRIMARY HYPERTENSION: Primary | ICD-10-CM

## 2023-10-20 DIAGNOSIS — M25.562 CHRONIC PAIN OF BOTH KNEES: ICD-10-CM

## 2023-10-20 DIAGNOSIS — Z00.00 MEDICARE ANNUAL WELLNESS VISIT, SUBSEQUENT: ICD-10-CM

## 2023-10-20 DIAGNOSIS — M81.0 AGE-RELATED OSTEOPOROSIS WITHOUT CURRENT PATHOLOGICAL FRACTURE: Chronic | ICD-10-CM

## 2023-10-20 RX ORDER — ESCITALOPRAM OXALATE 10 MG/1
10 TABLET ORAL
Qty: 90 TABLET | Refills: 1 | Status: SHIPPED | OUTPATIENT
Start: 2023-10-20

## 2023-10-20 NOTE — PROGRESS NOTES
Name: Allyson Leventhal      : 1925      MRN: [id number]  Encounter Provider: Brodie Zhao DO  Encounter Date: 10/20/2023   Encounter department: [address] [place] [address]     1. Primary hypertension  Comments:  BP doing well, c/w CCB    2. Depression with anxiety  Comments:  taking lexapro. cannot increase dose at this time as she is taking tramadol BID  Orders:  -     escitalopram (LEXAPRO) 10 mg tablet; Take 1 tablet (10 mg total) by mouth daily at bedtime    3. Age-related osteoporosis without current pathological fracture  Comments:  prolia dose administered today  Orders:  -     denosumab (PROLIA) subcutaneous injection 60 mg    4. Lumbar radiculopathy  Comments:  c/w tramadol but increase dose to 1 tab in AM and 1.5 tab in PM    5. Strain of left biceps tendon  Comments:  she declines PT and will try lidocaine cream on her arm prn  Orders:  -     Ambulatory Referral to Orthopedic Surgery; Future    6. Chronic pain of both knees  Comments:  suspect related to OA, refer to ortho  Orders:  -     Ambulatory Referral to Orthopedic Surgery; Future    7. Medicare annual wellness visit, subsequent        Urinary Incontinence Plan of Care: counseling topics discussed: limiting fluid intake 3-4 hours before bed. Subjective      HPI    Here for follow up, Vinicio Rose is here with her daughter during today's appt. She is c/o left arm pain, b/l knee pain. Her shoulder is doing better since she had injection. She is okay with having prolia shot today. She is taking tramadol 1 tab BID and not 1.5 tablets in the evening. She is anxious and lives in [address] [place] [address] and her daughter states it is difficult to get her out of the facility to appts. She is considering having her be seen by the PCP/provider at the [address] [place] [address]. She did not complete BW prior to today's appt. She had to cancel her neurology appt but will re-schedule it. ROS Otherwise negative, no other complaints.     Review of Systems   Constitutional:  Negative for fever. HENT:  Negative for congestion. Eyes:  Negative for visual disturbance. Respiratory:  Negative for shortness of breath. Cardiovascular:  Negative for chest pain. Gastrointestinal:  Negative for abdominal pain. Endocrine: Negative for polyuria. Genitourinary:  Negative for difficulty urinating. Musculoskeletal:  Positive for arthralgias, gait problem (using walker) and myalgias. Skin:  Negative for rash. Allergic/Immunologic: Negative for immunocompromised state. Neurological:  Negative for dizziness. Psychiatric/Behavioral:  Positive for dysphoric mood. Negative for suicidal ideas. The patient is nervous/anxious. Current Outpatient Medications on File Prior to Visit   Medication Sig    acetaminophen (TYLENOL) 325 mg tablet Take 3 tablets (975 mg total) by mouth every 8 (eight) hours    amLODIPine (Norvasc) 2.5 mg tablet Take 1 tablet (2.5 mg total) by mouth daily    calcium citrate-vitamin D (CITRACAL+D) 315-200 MG-UNIT per tablet Take 1 tablet by mouth daily    carbidopa-levodopa (SINEMET)  mg per tablet Take 1 tablet by mouth 3 (three) times a day    chlorhexidine (PERIDEX) 0.12 % solution Use as directed     cholecalciferol (VITAMIN D3) 1,000 units tablet Take 3,000 Units by mouth daily     Diclofenac Sodium (VOLTAREN) 1 % Apply 2 g topically every 12 (twelve) hours as needed (left shoulder pain/OA)    traMADol (ULTRAM) 50 mg tablet TAKE 1 TABLET BY MOUTH EVERY MORNING TAKE 1 TABLET BY MOUTH EVERY EVENING OKAY TO TAKE 1 TABLET MID-DAY FOR PAIN IF NEEDED    [DISCONTINUED] escitalopram (LEXAPRO) 10 mg tablet Take 1 tablet (10 mg total) by mouth daily at bedtime       Objective     /74   Pulse 73   Resp 15   Wt 63 kg (139 lb)   LMP  (LMP Unknown)   SpO2 97%   BMI 22.44 kg/m²     Physical Exam  Vitals reviewed. Constitutional:       General: She is not in acute distress. HENT:      Head: Normocephalic and atraumatic. Right Ear: Tympanic membrane normal.      Left Ear: Tympanic membrane normal.   Eyes:      Conjunctiva/sclera: Conjunctivae normal.   Cardiovascular:      Rate and Rhythm: Normal rate and regular rhythm. Heart sounds: No murmur heard. Pulmonary:      Effort: Pulmonary effort is normal.      Breath sounds: No wheezing or rales. Abdominal:      General: Bowel sounds are normal.      Palpations: Abdomen is soft. Tenderness: There is no abdominal tenderness. Musculoskeletal:      Left upper arm: Tenderness (left biceps tendon tenderness) present. Cervical back: Neck supple. Right knee: Swelling (bony swelling) and crepitus present. Left knee: Swelling (bony swelling) and crepitus present. Right lower leg: No edema. Left lower leg: No edema. Comments: Yergason's test positive LUE   Neurological:      Mental Status: She is alert. Mental status is at baseline. Psychiatric:         Mood and Affect: Mood is anxious. Behavior: Behavior normal.         Thought Content: Thought content does not include suicidal ideation.        Brodie Zhao, DO

## 2023-10-20 NOTE — PROGRESS NOTES
Assessment and Plan:     Problem List Items Addressed This Visit       Depression with anxiety (Chronic)    Relevant Medications    escitalopram (LEXAPRO) 10 mg tablet    Lumbar radiculopathy (Chronic)    Osteoporosis (Chronic)    Relevant Medications    denosumab (PROLIA) subcutaneous injection 60 mg (Completed)    Primary hypertension - Primary     Other Visit Diagnoses       Strain of left biceps tendon        she declines PT and will try lidocaine cream on her arm prn    Relevant Orders    Ambulatory Referral to Orthopedic Surgery    Chronic pain of both knees        suspect related to OA, refer to ortho    Relevant Orders    Ambulatory Referral to Orthopedic Surgery    Medicare annual wellness visit, subsequent                 Preventive health issues were discussed with patient, and age appropriate screening tests were ordered as noted in patient's After Visit Summary. Personalized health advice and appropriate referrals for health education or preventive services given if needed, as noted in patient's After Visit Summary.       Patient presents for a Medicare Wellness Visit      Patient Care Team:  Roni Walton DO as PCP - General (Internal Medicine)       Problem List:     Patient Active Problem List   Diagnosis    Depression with anxiety    Lumbar radiculopathy    Parkinson's disease    Tremor    Primary hypertension    Hyperlipidemia    Anemia    Arthritis    Atherosclerosis    Breast cancer ([address])    Esophageal reflux    Gastroparesis    Hoarseness    Osteoporosis    Nontoxic single thyroid nodule    Vitamin D deficiency    REM behavioral disorder    Other chest pain    Left leg swelling    Primary osteoarthritis of left shoulder    History of 2019 novel coronavirus disease (COVID-19)      Past Medical and Surgical History:     Past Medical History:   Diagnosis Date    Anxiety     Arthritis     Gastroparesis     History of atherosclerosis     History of breast cancer     History of osteoarthritis     Long term use of drug     Malignant neoplasm of breast ([address]) 04/11/2011    left w/mastectomy; age 80    Osteomalacia      Past Surgical History:   Procedure Laterality Date    BREAST BIOPSY Left 04/11/2011    u/s core-positive    HYSTERECTOMY  1974    age 52    MASTECTOMY Left 05/04/2011    malignant      Family History:     Family History   Problem Relation Age of Onset    Coronary artery disease Mother     Coronary artery disease Family     Hyperlipidemia Family     Osteoarthritis Family     Lung cancer Sister 48      Social History:     Social History     Socioeconomic History    Marital status:      Spouse name: None    Number of children: None    Years of education: None    Highest education level: None   Occupational History    None   Tobacco Use    Smoking status: Never    Smokeless tobacco: Never   Vaping Use    Vaping Use: Never used   Substance and Sexual Activity    Alcohol use: Yes     Comment: Social drinker    Drug use: No    Sexual activity: Not Currently   Other Topics Concern    None   Social History Narrative    None     Social Determinants of Health     Financial Resource Strain: Low Risk  (10/20/2023)    Overall Financial Resource Strain (CARDIA)     Difficulty of Paying Living Expenses: Not hard at all   Food Insecurity: No Food Insecurity (5/8/2023)    Hunger Vital Sign     Worried About Running Out of Food in the Last Year: Never true     Ran Out of Food in the Last Year: Never true   Transportation Needs: No Transportation Needs (10/20/2023)    PRAPARE - Transportation     Lack of Transportation (Medical): No     Lack of Transportation (Non-Medical):  No   Physical Activity: Not on file   Stress: Not on file   Social Connections: Not on file   Intimate Partner Violence: Not on file   Housing Stability: Low Risk  (5/8/2023)    Housing Stability Vital Sign     Unable to Pay for Housing in the Last Year: No     Number of Places Lived in the Last Year: 1     Unstable Housing in the Last Year: No Medications and Allergies:     Current Outpatient Medications   Medication Sig Dispense Refill    escitalopram (LEXAPRO) 10 mg tablet Take 1 tablet (10 mg total) by mouth daily at bedtime 90 tablet 1    acetaminophen (TYLENOL) 325 mg tablet Take 3 tablets (975 mg total) by mouth every 8 (eight) hours  0    amLODIPine (Norvasc) 2.5 mg tablet Take 1 tablet (2.5 mg total) by mouth daily 90 tablet 1    calcium citrate-vitamin D (CITRACAL+D) 315-200 MG-UNIT per tablet Take 1 tablet by mouth daily      carbidopa-levodopa (SINEMET)  mg per tablet Take 1 tablet by mouth 3 (three) times a day 270 tablet 3    chlorhexidine (PERIDEX) 0.12 % solution Use as directed   0    cholecalciferol (VITAMIN D3) 1,000 units tablet Take 3,000 Units by mouth daily       Diclofenac Sodium (VOLTAREN) 1 % Apply 2 g topically every 12 (twelve) hours as needed (left shoulder pain/OA) 100 g 1    traMADol (ULTRAM) 50 mg tablet TAKE 1 TABLET BY MOUTH EVERY MORNING TAKE 1 TABLET BY MOUTH EVERY EVENING OKAY TO TAKE 1 TABLET MID-DAY FOR PAIN IF NEEDED 75 tablet 1     No current facility-administered medications for this visit. No Known Allergies   Immunizations:     Immunization History   Administered Date(s) Administered    COVID-19 PFIZER VACCINE 0.3 ML IM 01/22/2021, 02/12/2021    INFLUENZA 10/21/2014, 09/30/2015, 09/20/2016, 11/14/2017, 10/06/2023    Influenza Quadrivalent Preservative Free 3 years and older IM 10/21/2014    Influenza Split High Dose Preservative Free IM 09/30/2015, 09/20/2016, 11/14/2017, 10/11/2019    Influenza, Seasonal Vaccine, Quadrivalent, Adjuvanted, .5e 09/26/2023    Influenza, high dose seasonal 0.7 mL 10/20/2018, 09/11/2020, 10/13/2021    Pneumococcal Conjugate 13-Valent 12/08/2015    Pneumococcal Polysaccharide PPV23 11/22/2019    Zoster 03/04/2013, 05/17/2018    Zoster Vaccine Recombinant 05/17/2018, 07/24/2018      Health Maintenance:      There are no preventive care reminders to display for this patient. Topic Date Due    COVID-19 Vaccine (3 - Pfizer series) 04/09/2021      Medicare Screening Tests and Risk Assessments:     Clark Edwards is here for her Subsequent Wellness visit. Last Medicare Wellness visit information reviewed, patient interviewed and updates made to the record today. Health Risk Assessment:   Patient rates overall health as fair. Patient feels that their physical health rating is slightly worse. Patient is dissatisfied with their life. Eyesight was rated as slightly worse. Hearing was rated as slightly worse. Patient feels that their emotional and mental health rating is same. Patients states they are sometimes angry. Patient states they are often unusually tired/fatigued. Pain experienced in the last 7 days has been a lot. Patient's pain rating has been 7/10. Patient states that she has experienced weight loss or gain in last 6 months. Depression Screening:   PHQ-9 Score: 18      Fall Risk Screening: In the past year, patient has experienced: no history of falling in past year      Urinary Incontinence Screening:   Patient has leaked urine accidently in the last six months. Home Safety:  Patient has trouble with stairs inside or outside of their home. Patient has working smoke alarms and has working carbon monoxide detector. Home safety hazards include: none. Nutrition:   Current diet is Regular. Medications:   Patient is not currently taking any over-the-counter supplements. Patient is not able to manage medications. Activities of Daily Living (ADLs)/Instrumental Activities of Daily Living (IADLs):   Walk and transfer into and out of bed and chair?: No  Dress and groom yourself?: Yes    Bathe or shower yourself?: Yes    Feed yourself?  Yes  Do your laundry/housekeeping?: No  Manage your money, pay your bills and track your expenses?: No  Make your own meals?: No    Do your own shopping?: No    Previous Hospitalizations:   Any hospitalizations or ED visits within the last 12 months?: No      Advance Care Planning:   Living will: Yes    Durable POA for healthcare: Yes    Advanced directive: Yes      PREVENTIVE SCREENINGS      Cardiovascular Screening:    General: Screening Not Indicated and History Lipid Disorder      Diabetes Screening:     General: Screening Current      Colorectal Cancer Screening:     General: Screening Not Indicated      Breast Cancer Screening:     General: History Breast Cancer      Cervical Cancer Screening:    General: Screening Not Indicated      Osteoporosis Screening:    General: Screening Not Indicated and History Osteoporosis      Abdominal Aortic Aneurysm (AAA) Screening:        General: Screening Not Indicated      Lung Cancer Screening:     General: Screening Not Indicated      Hepatitis C Screening:    General: Screening Not Indicated    Screening, Brief Intervention, and Referral to Treatment (SBIRT)    Screening  Typical number of drinks in a day: 0  Typical number of drinks in a week: 0  Interpretation: Low risk drinking behavior. AUDIT-C Screenin) How often did you have a drink containing alcohol in the past year? never  2) How many drinks did you have on a typical day when you were drinking in the past year? 0  3) How often did you have 6 or more drinks on one occasion in the past year? never    AUDIT-C Score: 0  Interpretation: Score 0-2 (female): Negative screen for alcohol misuse    Single Item Drug Screening:  How often have you used an illegal drug (including marijuana) or a prescription medication for non-medical reasons in the past year? never    Single Item Drug Screen Score: 0  Interpretation: Negative screen for possible drug use disorder    Review of Current Opioid Use    Opioid Risk Tool (ORT) Interpretation: Complete Opioid Risk Tool (ORT)    No results found.       /74   Pulse 73   Resp 15   Wt 63 kg (139 lb)   LMP  (LMP Unknown)   SpO2 97%   BMI 22.44 kg/m²         Abdirahman Forrest DO

## 2023-10-20 NOTE — PATIENT INSTRUCTIONS
women with a history of total hysterectomy, cervical cancer, or abnormal pap smears in past. Pap Smear: Not on file    Screening Not Indicated   Hepatitis C Screening Once for adults born between 1945 and 1965  More frequently in patients at high risk for Hepatitis C Hep C Antibody: Not on file        Diabetes Screening 1-2 times per year if you're at risk for diabetes or have pre-diabetes Fasting glucose: 73 mg/dL (4/3/2023)  A1C: No results in last 5 years (No results in last 5 years)  Screening Current   Cholesterol Screening Once every 5 years if you don't have a lipid disorder. May order more often based on risk factors. Lipid panel: Not on file    Screening Not Indicated  History Lipid Disorder     Other Preventive Screenings Covered by Medicare:  Abdominal Aortic Aneurysm (AAA) Screening: covered once if your at risk. You're considered to be at risk if you have a family history of AAA. Lung Cancer Screening: covers low dose CT scan once per year if you meet all of the following conditions: (1) Age 48-67; (2) No signs or symptoms of lung cancer; (3) Current smoker or have quit smoking within the last 15 years; (4) You have a tobacco smoking history of at least 20 pack years (packs per day multiplied by number of years you smoked); (5) You get a written order from a healthcare provider. Glaucoma Screening: covered annually if you're considered high risk: (1) You have diabetes OR (2) Family history of glaucoma OR (3)  aged 48 and older OR (3)  American aged 72 and older  Osteoporosis Screening: covered every 2 years if you meet one of the following conditions: (1) You're estrogen deficient and at risk for osteoporosis based off medical history and other findings; (2) Have a vertebral abnormality; (3) On glucocorticoid therapy for more than 3 months; (4) Have primary hyperparathyroidism; (5) On osteoporosis medications and need to assess response to drug therapy.    Last bone density test (DXA Scan): 03/30/2023. HIV Screening: covered annually if you're between the age of 14-79. Also covered annually if you are younger than 13 and older than 72 with risk factors for HIV infection. For pregnant patients, it is covered up to 3 times per pregnancy. Immunizations:  Immunization Recommendations   Influenza Vaccine Annual influenza vaccination during flu season is recommended for all persons aged >= 6 months who do not have contraindications   Pneumococcal Vaccine   * Pneumococcal conjugate vaccine = PCV13 (Prevnar 13), PCV15 (Vaxneuvance), PCV20 (Prevnar 20)  * Pneumococcal polysaccharide vaccine = PPSV23 (Pneumovax) Adults 65-60 yo with certain risk factors or if 69+ yo  If never received any pneumonia vaccine: recommend Prevnar 20 (PCV20)  Give PCV20 if previously received 1 dose of PCV13 or PPSV23   Hepatitis B Vaccine 3 dose series if at intermediate or high risk (ex: diabetes, end stage renal disease, liver disease)   Respiratory syncytial virus (RSV) Vaccine - COVERED BY MEDICARE PART D  * RSVPreF3 (Arexvy) CDC recommends that adults 61years of age and older may receive a single dose of RSV vaccine using shared clinical decision-making (SCDM)   Tetanus (Td) Vaccine - COST NOT COVERED BY MEDICARE PART B Following completion of primary series, a booster dose should be given every 10 years to maintain immunity against tetanus. Td may also be given as tetanus wound prophylaxis. Tdap Vaccine - COST NOT COVERED BY MEDICARE PART B Recommended at least once for all adults. For pregnant patients, recommended with each pregnancy. Shingles Vaccine (Shingrix) - COST NOT COVERED BY MEDICARE PART B  2 shot series recommended in those 19 years and older who have or will have weakened immune systems or those 50 years and older     Health Maintenance Due:  There are no preventive care reminders to display for this patient.   Immunizations Due:      Topic Date Due    COVID-19 Vaccine (3 - Pfizer series) 04/09/2021     Advance Directives   What are advance directives? Advance directives are legal documents that state your wishes and plans for medical care. These plans are made ahead of time in case you lose your ability to make decisions for yourself. Advance directives can apply to any medical decision, such as the treatments you want, and if you want to donate organs. What are the types of advance directives? There are many types of advance directives, and each state has rules about how to use them. You may choose a combination of any of the following:  Living will: This is a written record of the treatment you want. You can also choose which treatments you do not want, which to limit, and which to stop at a certain time. This includes surgery, medicine, IV fluid, and tube feedings. Durable power of  for [facility]): This is a written record that states who you want to make healthcare choices for you when you are unable to make them for yourself. This person, called a proxy, is usually a family member or a friend. You may choose more than 1 proxy. Do not resuscitate (DNR) order:  A DNR order is used in case your heart stops beating or you stop breathing. It is a request not to have certain forms of treatment, such as CPR. A DNR order may be included in other types of advance directives. Medical directive: This covers the care that you want if you are in a coma, near death, or unable to make decisions for yourself. You can list the treatments you want for each condition. Treatment may include pain medicine, surgery, blood transfusions, dialysis, IV or tube feedings, and a ventilator (breathing machine). Values history: This document has questions about your views, beliefs, and how you feel and think about life. This information can help others choose the care that you would choose. Why are advance directives important? An advance directive helps you control your care.  Although spoken wishes may be used, it is better to have your wishes written down. Spoken wishes can be misunderstood, or not followed. Treatments may be given even if you do not want them. An advance directive may make it easier for your family to make difficult choices about your care. Urinary Incontinence   Urinary incontinence (UI)  is when you lose control of your bladder. UI develops because your bladder cannot store or empty urine properly. The 3 most common types of UI are stress incontinence, urge incontinence, or both. Medicines:   May be given to help strengthen your bladder control. Report any side effects of medication to your healthcare provider. Do pelvic muscle exercises often:  Your pelvic muscles help you stop urinating. Squeeze these muscles tight for 5 seconds, then relax for 5 seconds. Gradually work up to squeezing for 10 seconds. Do 3 sets of 15 repetitions a day, or as directed. This will help strengthen your pelvic muscles and improve bladder control. Train your bladder:  Go to the bathroom at set times, such as every 2 hours, even if you do not feel the urge to go. You can also try to hold your urine when you feel the urge to go. For example, hold your urine for 5 minutes when you feel the urge to go. As that becomes easier, hold your urine for 10 minutes. Self-care:   Keep a UI record. Write down how often you leak urine and how much you leak. Make a note of what you were doing when you leaked urine. Drink liquids as directed. You may need to limit the amount of liquid you drink to help control your urine leakage. Do not drink any liquid right before you go to bed. Limit or do not have drinks that contain caffeine or alcohol. Prevent constipation. Eat a variety of high-fiber foods. Good examples are high-fiber cereals, beans, vegetables, and whole-grain breads. Walking is the best way to trigger your intestines to have a bowel movement. Exercise regularly and maintain a healthy weight. Weight loss and exercise will decrease pressure on your bladder and help you control your leakage. Use a catheter as directed  to help empty your bladder. A catheter is a tiny, plastic tube that is put into your bladder to drain your urine. Go to behavior therapy as directed. Behavior therapy may be used to help you learn to control your urge to urinate. Narcotic (Opioid) Safety    Use narcotics safely:  Take prescribed narcotics exactly as directed  Do not give narcotics to others or take narcotics that belong to someone else  Do not mix narcotics without medicines or alcohol  Do not drive or operate heavy machinery after you take the narcotic  Monitor for side effects and notify your healthcare provider if you experienced side effects such as nausea, sleepiness, itching, or trouble thinking clearly. Manage constipation:    Constipation is the most common side effect of narcotic medicine. Constipation is when you have hard, dry bowel movements, or you go longer than usual between bowel movements. Tell your healthcare provider about all changes in your bowel movements while you are taking narcotics. He or she may recommend laxative medicine to help you have a bowel movement. He or she may also change the kind of narcotic you are taking, or change when you take it. The following are more ways you can prevent or relieve constipation:    Drink liquids as directed. You may need to drink extra liquids to help soften and move your bowels. Ask how much liquid to drink each day and which liquids are best for you. Eat high-fiber foods. This may help decrease constipation by adding bulk to your bowel movements. High-fiber foods include fruits, vegetables, whole-grain breads and cereals, and beans. Your healthcare provider or dietitian can help you create a high-fiber meal plan. Your provider may also recommend a fiber supplement if you cannot get enough fiber from food. Exercise regularly.   Regular physical activity can help stimulate your intestines. Walking is a good exercise to prevent or relieve constipation. Ask which exercises are best for you. Schedule a time each day to have a bowel movement. This may help train your body to have regular bowel movements. Bend forward while you are on the toilet to help move the bowel movement out. Sit on the toilet for at least 10 minutes, even if you do not have a bowel movement. Store narcotics safely:   Store narcotics where others cannot easily get them. Keep them in a locked cabinet or secure area. Do not  keep them in a purse or other bag you carry with you. A person may be looking for something else and find the narcotics. Make sure narcotics are stored out of the reach of children. A child can easily overdose on narcotics. Narcotics may look like candy to a small child. The best way to dispose of narcotics: The laws vary by country and area. In the [facility], the best way is to return the narcotics through a take-back program. This program is offered by the [employer]). The following are options for using the program:  Take the narcotics to a [place]ON[place]AN collection site. The site is often a law enforcement center. Call your local law enforcement center for scheduled take-back days in your area. You will be given information on where to go if the collection site is in a different location. Take the narcotics to an approved pharmacy or hospital.  A pharmacy or hospital may be set up as a collection site. You will need to ask if it is a [place]ON[place]AN collection site if you were not directed there. A pharmacy or doctor's office may not be able to take back narcotics unless it is a JONATAN site. Use a mail-back system. This means you are given containers to put the narcotics into. You will then mail them in the containers. Use a take-back drop box. This is a place to leave the narcotics at any time.  People and animals will not be able to get into the box. Your local law enforcement agency can tell you where to find a drop box in your area. Other ways to manage pain:   Ask your healthcare provider about non-narcotic medicines to control pain. Nonprescription medicines include NSAIDs (such as ibuprofen) and acetaminophen. Prescription medicines include muscle relaxers, antidepressants, and steroids. Pain may be managed without any medicines. Some ways to relieve pain include massage, aromatherapy, or meditation. Physical or occupational therapy may also help. For more information:   Drug Enforcement Administration  [address]  Phone: 6- 651 - 750-5856  Web Address: [employer]/drug_disposal/    [address] [place] [address]  Phone: 1- 028 - 368-4151  Web Address: http://[employer]/     © Copyright [employer] 2018 Information is for End User's use only and may not be sold, redistributed or otherwise used for commercial purposes.  All illustrations and images included in CareNotes® are the copyrighted property of [employer].A[employer]M., Inc. or  [address]

## 2023-11-27 DIAGNOSIS — G89.29 CHRONIC LEFT SHOULDER PAIN: ICD-10-CM

## 2023-11-27 DIAGNOSIS — M25.512 CHRONIC LEFT SHOULDER PAIN: ICD-10-CM

## 2023-12-08 DIAGNOSIS — M54.16 LUMBAR RADICULOPATHY: Chronic | ICD-10-CM

## 2023-12-08 RX ORDER — TRAMADOL HYDROCHLORIDE 50 MG/1
TABLET ORAL
Qty: 75 TABLET | Refills: 1 | Status: SHIPPED | OUTPATIENT
Start: 2023-12-08

## 2024-01-13 ENCOUNTER — APPOINTMENT (OUTPATIENT)
Dept: LAB | Facility: CLINIC | Age: 89
End: 2024-01-13
Payer: MEDICARE

## 2024-01-13 DIAGNOSIS — F41.8 DEPRESSION WITH ANXIETY: Chronic | ICD-10-CM

## 2024-01-13 DIAGNOSIS — F32.A DEPRESSIVE TYPE PSYCHOSIS: ICD-10-CM

## 2024-01-13 DIAGNOSIS — I10 ESSENTIAL HYPERTENSION, MALIGNANT: ICD-10-CM

## 2024-01-13 DIAGNOSIS — G20.A1 PARKINSON'S DISEASE, UNSPECIFIED WHETHER DYSKINESIA PRESENT, UNSPECIFIED WHETHER MANIFESTATIONS FLUCTUATE: ICD-10-CM

## 2024-01-13 LAB
25(OH)D3 SERPL-MCNC: 32.5 NG/ML (ref 30–100)
ALBUMIN SERPL BCP-MCNC: 3.7 G/DL (ref 3.5–5)
ALP SERPL-CCNC: 47 U/L (ref 34–104)
ALT SERPL W P-5'-P-CCNC: <3 U/L (ref 7–52)
ANION GAP SERPL CALCULATED.3IONS-SCNC: 4 MMOL/L
AST SERPL W P-5'-P-CCNC: 11 U/L (ref 13–39)
BASOPHILS # BLD AUTO: 0.03 THOUSANDS/ÂΜL (ref 0–0.1)
BASOPHILS NFR BLD AUTO: 1 % (ref 0–1)
BILIRUB SERPL-MCNC: 0.56 MG/DL (ref 0.2–1)
BUN SERPL-MCNC: 17 MG/DL (ref 5–25)
CALCIUM SERPL-MCNC: 9 MG/DL (ref 8.4–10.2)
CHLORIDE SERPL-SCNC: 105 MMOL/L (ref 96–108)
CHOLEST SERPL-MCNC: 167 MG/DL
CO2 SERPL-SCNC: 30 MMOL/L (ref 21–32)
CREAT SERPL-MCNC: 0.76 MG/DL (ref 0.6–1.3)
EOSINOPHIL # BLD AUTO: 0.16 THOUSAND/ÂΜL (ref 0–0.61)
EOSINOPHIL NFR BLD AUTO: 3 % (ref 0–6)
ERYTHROCYTE [DISTWIDTH] IN BLOOD BY AUTOMATED COUNT: 12.3 % (ref 11.6–15.1)
FOLATE SERPL-MCNC: 7.8 NG/ML
GFR SERPL CREATININE-BSD FRML MDRD: 65 ML/MIN/1.73SQ M
GLUCOSE P FAST SERPL-MCNC: 82 MG/DL (ref 65–99)
HCT VFR BLD AUTO: 40.9 % (ref 34.8–46.1)
HDLC SERPL-MCNC: 41 MG/DL
HGB BLD-MCNC: 13.4 G/DL (ref 11.5–15.4)
IMM GRANULOCYTES # BLD AUTO: 0.02 THOUSAND/UL (ref 0–0.2)
IMM GRANULOCYTES NFR BLD AUTO: 0 % (ref 0–2)
LDLC SERPL CALC-MCNC: 108 MG/DL (ref 0–100)
LYMPHOCYTES # BLD AUTO: 1.64 THOUSANDS/ÂΜL (ref 0.6–4.47)
LYMPHOCYTES NFR BLD AUTO: 28 % (ref 14–44)
MAGNESIUM SERPL-MCNC: 2 MG/DL (ref 1.9–2.7)
MCH RBC QN AUTO: 31.7 PG (ref 26.8–34.3)
MCHC RBC AUTO-ENTMCNC: 32.8 G/DL (ref 31.4–37.4)
MCV RBC AUTO: 97 FL (ref 82–98)
MONOCYTES # BLD AUTO: 0.51 THOUSAND/ÂΜL (ref 0.17–1.22)
MONOCYTES NFR BLD AUTO: 9 % (ref 4–12)
NEUTROPHILS # BLD AUTO: 3.52 THOUSANDS/ÂΜL (ref 1.85–7.62)
NEUTS SEG NFR BLD AUTO: 59 % (ref 43–75)
NONHDLC SERPL-MCNC: 126 MG/DL
NRBC BLD AUTO-RTO: 0 /100 WBCS
PLATELET # BLD AUTO: 275 THOUSANDS/UL (ref 149–390)
PMV BLD AUTO: 9.7 FL (ref 8.9–12.7)
POTASSIUM SERPL-SCNC: 4 MMOL/L (ref 3.5–5.3)
PROT SERPL-MCNC: 6.4 G/DL (ref 6.4–8.4)
RBC # BLD AUTO: 4.23 MILLION/UL (ref 3.81–5.12)
SODIUM SERPL-SCNC: 139 MMOL/L (ref 135–147)
T4 FREE SERPL-MCNC: 0.8 NG/DL (ref 0.61–1.12)
TRIGL SERPL-MCNC: 92 MG/DL
TSH SERPL DL<=0.05 MIU/L-ACNC: 1.39 UIU/ML (ref 0.45–4.5)
VIT B12 SERPL-MCNC: 161 PG/ML (ref 180–914)
WBC # BLD AUTO: 5.88 THOUSAND/UL (ref 4.31–10.16)

## 2024-01-13 PROCEDURE — 82746 ASSAY OF FOLIC ACID SERUM: CPT

## 2024-01-13 PROCEDURE — 84439 ASSAY OF FREE THYROXINE: CPT

## 2024-01-13 PROCEDURE — 36415 COLL VENOUS BLD VENIPUNCTURE: CPT

## 2024-01-13 PROCEDURE — 84443 ASSAY THYROID STIM HORMONE: CPT

## 2024-01-13 PROCEDURE — 80053 COMPREHEN METABOLIC PANEL: CPT

## 2024-01-13 PROCEDURE — 85025 COMPLETE CBC W/AUTO DIFF WBC: CPT

## 2024-01-13 PROCEDURE — 82306 VITAMIN D 25 HYDROXY: CPT

## 2024-01-13 PROCEDURE — 82607 VITAMIN B-12: CPT

## 2024-01-13 PROCEDURE — 83036 HEMOGLOBIN GLYCOSYLATED A1C: CPT

## 2024-01-13 PROCEDURE — 80061 LIPID PANEL: CPT

## 2024-01-13 PROCEDURE — 83735 ASSAY OF MAGNESIUM: CPT

## 2024-01-14 LAB
EST. AVERAGE GLUCOSE BLD GHB EST-MCNC: 114 MG/DL
HBA1C MFR BLD: 5.6 %

## 2024-01-15 RX ORDER — ESCITALOPRAM OXALATE 10 MG/1
5 TABLET ORAL 2 TIMES DAILY
Qty: 90 TABLET | Refills: 1 | Status: SHIPPED | OUTPATIENT
Start: 2024-01-15

## 2024-06-05 ENCOUNTER — CONSULT (OUTPATIENT)
Dept: NEUROLOGY | Facility: CLINIC | Age: 89
End: 2024-06-05
Payer: MEDICARE

## 2024-06-05 VITALS
HEART RATE: 97 BPM | BODY MASS INDEX: 20.41 KG/M2 | HEIGHT: 66 IN | SYSTOLIC BLOOD PRESSURE: 112 MMHG | WEIGHT: 127 LBS | DIASTOLIC BLOOD PRESSURE: 82 MMHG | TEMPERATURE: 98.3 F

## 2024-06-05 DIAGNOSIS — G20.B1 PARKINSON'S DISEASE WITH DYSKINESIA WITHOUT FLUCTUATING MANIFESTATIONS: Primary | Chronic | ICD-10-CM

## 2024-06-05 DIAGNOSIS — M19.012 PRIMARY OSTEOARTHRITIS OF LEFT SHOULDER: ICD-10-CM

## 2024-06-05 PROCEDURE — G2211 COMPLEX E/M VISIT ADD ON: HCPCS | Performed by: PHYSICIAN ASSISTANT

## 2024-06-05 PROCEDURE — 99214 OFFICE O/P EST MOD 30 MIN: CPT | Performed by: PHYSICIAN ASSISTANT

## 2024-06-05 RX ORDER — LANOLIN ALCOHOL/MO/W.PET/CERES
CREAM (GRAM) TOPICAL DAILY
COMMUNITY

## 2024-06-05 NOTE — PATIENT INSTRUCTIONS
Will remain on current Sinemet dose at this time.   May consider increasing the dose further in the future however would need to be cautious with worsening dyskinesia or orthostatic issues

## 2024-06-05 NOTE — PROGRESS NOTES
Patient ID: Bri Noel is a 89 y.o. female.    Assessment/Plan:    Parkinson's disease (HCC)  Patient overall doing well on low-dose Sinemet.  She feels tremors are controlled for the most part, she will notice some occasional right hand tremor however this is infrequent.  She denies any clear on or off with the medication.  She continues to have generalized fatigue which has been present since her last visit in 2021 as well.  She does notice that she is slower with walking however daughter does not feel that she is shuffling or freezing.  She is still independent with dressing, requires assistance with showers.  Medication is now provided by nurses at her facility.    We did discuss the option of making adjustments to her Sinemet dose that she has remained on her low-dose for a number of years without any changes.  She has been dealing with orthostatic hypotension, in the ED twice for this.  She also was noted to have some questionable dyskinetic movement of the right leg throughout the visit, this was also peak dose.  Unclear if she has these movements on a regular basis.  Overall given she is still functioning fairly well and she feels tremors are controlled, we opted to hold off on making any adjustments.  We did discuss that unfortunately further increases in medication could worsen both dyskinesia and orthostatic hypotension.  Because of this we would need to be very cautious in the future with any increases.    For now she will remain on Sinemet 1 tab 3 times a day.  She was also encouraged to remain active.  She does get physical therapy at this facility, currently on a break, but may restart as needed.    Primary osteoarthritis of left shoulder  One of her main concerns today is continued left arm and shoulder pain.  This has been present since removal of left breast for cancer about 8 years ago.  She was seen by orthopedic in the past and had an injection with minimal benefit.  It appears PCP recently  "placed an order for orthopedic evaluation once again however she is not interested in any further injections. She will continue to follow with her PCP regarding this concern.         Subjective:    Bri Noel is a rosalinda 89 year old right handed woman who presents in follow up for Parkinson's disease. To review,  symptom onset with right hand tremor which began at age 89. She does appear to be levodopa responsive.      At her last visit (2021) she was doing well, she did not wish to make any changes to her Sinemet dose.      INTERVAL HISTORY:  [facility]   She likes it there for the most part   Medications are given to her   She gets help with showers   She struggles with getting her socks on and getting shirts on and off due to the left arm   She had breast cancer at 89 and left breast removed, following  this she began to have pain in the left arm   Some days this pain is better than others   Walking has slowed down  Not clear shuffling   She always uses the walker   She had PT in the past, currently not doing it   She does have some trouble with swallowing if she has a large bite, she has to be cautious with small amounts   Pills are crushed in applesauce   No hallucinations   No issues with vivid dreams lately   No clear on with the medications  Unclear if the medication is wearing off   Very seldom will have a tremor  She has had issues with orthostatic hypotension, went to the ED for this in the past   This seems  to be a little better than in the past but still an issue at times     Current medications:  - Sinemet 25/100; 1 tab; TID 7a 1p 7p (additional ½ tab if needed)   - lexapro 10mg BID         I personally reviewed and updated the ROS.       Objective:    Blood pressure 112/82, pulse 97, temperature 98.3 °F (36.8 °C), height 5' 6\" (1.676 m), weight 57.6 kg (127 lb), not currently breastfeeding.    Physical Exam  Constitutional:       General: She is awake.      Appearance: Normal " appearance.   Eyes:      General: Lids are normal.      Extraocular Movements: Extraocular movements intact.      Pupils: Pupils are equal, round, and reactive to light.   Pulmonary:      Effort: Pulmonary effort is normal.   Neurological:      Mental Status: She is alert.         Neurological Exam  Mental Status  Awake and alert. Oriented to person, place and time.    Cranial Nerves  CN III, IV, VI: Extraocular movements intact bilaterally. Normal lids and orbits bilaterally. Pupils equal round and reactive to light bilaterally.  CN V:  Right: Facial sensation is normal.  Left: Facial sensation is normal on the left.  CN VIII:  Right: Hearing is decreased.  Left: Hearing is decreased.  CN XI: Shoulder shrug strength is normal.    Motor   Strength is 5/5 in all four extremities except as noted.  Difficulty with testing of the left arm due to pain with movement of the shoulder .    Sensory  Light touch is normal in upper and lower extremities.     Coordination  Right: Finger-to-nose abnormality:Left: Finger-to-nose abnormality:    Gait      Slow to rise from the chair and able to do so independently.  Ambulating with walker.  Slight imbalance more noted with turns. NO freezing .        UPDRS motor:                              Time since last dose:   6/5/24 8/18/21   Speech  1  0   Facial Expression  1     Rigidity - Neck  0  0   Rigidity - Upper Extremity (Right)  0  0   Rigidity - Upper Extremity (Left)   0  0   Rigidity - Lower Extremity (Right)  0  0   Rigidity - Lower Extremity (Left)   0  0   Finger Taps (Right)   2  2   Finger Taps (Left)   2  1   Hand Movement (Right)  1  2   Hand Movement (Left)   1  1   Pronation/Supination (Right)  2  2   Pronation/Supination (Left)   1  1   Toe Tapping (Right) 1  1   Toe Tapping (Left) 0  1   Leg Agility (Right)  1  0   Leg Agility (Left)   0  0   Arising from Chair   2  1   Gait   3  2   Freezing of Gait 0  0   Postural Stability         Posture 2  2   Global  spontaneity of movement 1  1   Postural Tremor (Right) 0  0   Postural Tremor (Left) 0  0   Kinetic Tremor (Right)  0  0   Kinetic Tremor (Left)  0  0   Rest tremor amplitude RUE 1  3   Rest tremor amplitude LUE 0  0   Rest tremor amplitude RLE 0  0   Reset tremor amplitude LLE 0  0   Lip/Jaw Tremor  2     Consistency of tremor 2  2   Motor Exam Total:            ROS:    Review of Systems   Constitutional:  Negative for appetite change, fatigue and fever.   HENT: Negative.  Negative for hearing loss, tinnitus, trouble swallowing and voice change.    Eyes: Negative.  Negative for photophobia, pain and visual disturbance.   Respiratory: Negative.  Negative for shortness of breath.    Cardiovascular: Negative.  Negative for palpitations.   Gastrointestinal: Negative.  Negative for nausea and vomiting.   Endocrine: Negative.  Negative for cold intolerance.   Genitourinary: Negative.  Negative for dysuria, frequency and urgency.   Musculoskeletal:  Positive for gait problem. Negative for back pain, myalgias, neck pain and neck stiffness.        Balance has gotten worse, no falls ER/LOC. Steps have gotten small and slow.   Skin: Negative.  Negative for rash.   Allergic/Immunologic: Negative.    Neurological:  Negative for dizziness, tremors, seizures, syncope, facial asymmetry, speech difficulty, weakness, light-headedness, numbness and headaches.   Hematological: Negative.  Does not bruise/bleed easily.   Psychiatric/Behavioral: Negative.  Negative for confusion, hallucinations and sleep disturbance.    All other systems reviewed and are negative.    Patient complaining of extreme pain in L arm. Got Cortizone injection few years ago daughter said it helped for awhile

## 2024-06-05 NOTE — ASSESSMENT & PLAN NOTE
One of her main concerns today is continued left arm and shoulder pain.  This has been present since removal of left breast for cancer about 8 years ago.  She was seen by orthopedic in the past and had an injection with minimal benefit.  It appears PCP recently placed an order for orthopedic evaluation once again however she is not interested in any further injections. She will continue to follow with her PCP regarding this concern.

## 2024-06-05 NOTE — ASSESSMENT & PLAN NOTE
Patient overall doing well on low-dose Sinemet.  She feels tremors are controlled for the most part, she will notice some occasional right hand tremor however this is infrequent.  She denies any clear on or off with the medication.  She continues to have generalized fatigue which has been present since her last visit in 2021 as well.  She does notice that she is slower with walking however daughter does not feel that she is shuffling or freezing.  She is still independent with dressing, requires assistance with showers.  Medication is now provided by nurses at her facility.    We did discuss the option of making adjustments to her Sinemet dose that she has remained on her low-dose for a number of years without any changes.  She has been dealing with orthostatic hypotension, in the ED twice for this.  She also was noted to have some questionable dyskinetic movement of the right leg throughout the visit, this was also peak dose.  Unclear if she has these movements on a regular basis.  Overall given she is still functioning fairly well and she feels tremors are controlled, we opted to hold off on making any adjustments.  We did discuss that unfortunately further increases in medication could worsen both dyskinesia and orthostatic hypotension.  Because of this we would need to be very cautious in the future with any increases.    For now she will remain on Sinemet 1 tab 3 times a day.  She was also encouraged to remain active.  She does get physical therapy at this facility, currently on a break, but may restart as needed.

## 2024-10-29 ENCOUNTER — OFFICE VISIT (OUTPATIENT)
Dept: NEUROLOGY | Facility: CLINIC | Age: 89
End: 2024-10-29
Payer: MEDICARE

## 2024-10-29 VITALS — DIASTOLIC BLOOD PRESSURE: 60 MMHG | HEART RATE: 85 BPM | SYSTOLIC BLOOD PRESSURE: 110 MMHG

## 2024-10-29 DIAGNOSIS — G20.A1 PARKINSON'S DISEASE (HCC): Primary | Chronic | ICD-10-CM

## 2024-10-29 DIAGNOSIS — M19.012 PRIMARY OSTEOARTHRITIS OF LEFT SHOULDER: ICD-10-CM

## 2024-10-29 PROCEDURE — G2211 COMPLEX E/M VISIT ADD ON: HCPCS | Performed by: PHYSICIAN ASSISTANT

## 2024-10-29 PROCEDURE — 99214 OFFICE O/P EST MOD 30 MIN: CPT | Performed by: PHYSICIAN ASSISTANT

## 2024-10-29 NOTE — ASSESSMENT & PLAN NOTE
Parkinson's disease complicated by dyskinesia and orthostatic hypotension.  She continues to have good tremor control on current dosing of medication.  She does not notice any clear wearing off between doses however she will notice the medicine kick in especially after her morning dose.  On exam today she was noted to have right sided dyskinesia as well as orthostatic hypotension.  Because of this we discussed hesitancy to increase her Sinemet dose further given this could worsen both of these issues.  At this time we have elected to remain on current Sinemet dose, 1 tab 3 times a day.  In the future if she were to notice wearing off we could consider 1 tab 4 times a day to see if this would be tolerated.    She was encouraged remain active.  She does do exercise and activity programs at her facility.  Using a walker for added stability.

## 2024-10-29 NOTE — PROGRESS NOTES
Review of Systems   Constitutional:  Positive for appetite change (Doesnt eat much). Negative for fatigue and fever.   HENT:  Positive for trouble swallowing and voice change. Negative for hearing loss and tinnitus.    Eyes: Negative.  Negative for photophobia, pain and visual disturbance.   Respiratory: Negative.  Negative for shortness of breath.    Cardiovascular: Negative.  Negative for palpitations.   Gastrointestinal: Negative.  Negative for nausea and vomiting.   Endocrine: Negative.  Negative for cold intolerance.   Genitourinary: Negative.  Negative for dysuria, frequency and urgency.   Musculoskeletal:  Positive for gait problem (Steps are shorter and walks slowly) and myalgias (Arm). Negative for back pain, neck pain and neck stiffness.        Balance Issues, has gotten a little worse     Skin: Negative.  Negative for rash.   Allergic/Immunologic: Negative.    Neurological:  Positive for dizziness (At times in the AM before she takes her med), tremors (At times in Right Arm), speech difficulty (Slurred Speech) and weakness (At times with Legs). Negative for seizures, syncope, facial asymmetry, light-headedness, numbness and headaches.   Hematological: Negative.  Does not bruise/bleed easily.   Psychiatric/Behavioral: Negative.  Negative for confusion, hallucinations and sleep disturbance.    All other systems reviewed and are negative.

## 2024-10-29 NOTE — ASSESSMENT & PLAN NOTE
She is still having left arm pain which can be very bothersome to her.  She has pain with palpation of the shoulder joint. Managed by her PCP at the facility, on Tramadol.  If not tried in the past could consider a trial of Neurontin as she does complain of pain that radiates down the arm into the fingers at times as well, suggestive of nerve pain.   She was encouraged to continue with her exercises and activity

## 2024-10-29 NOTE — PATIENT INSTRUCTIONS
Will remain on current dose of Sinemet 1tab three times a day.  She is still having left arm pain which can be very bothersome to her.  She has pain with palpation of the shoulder joint. Managed by her PCP at the facility, on Tramadol.  If not tried in the past could consider a trial of Neurontin as she does complain of pain that radiates down the arm into the fingers at times as well, suggestive of nerve pain.   She was encouraged to continue with her exercises and activity

## 2024-10-29 NOTE — PROGRESS NOTES
Ambulatory Visit  Name: Bri Noel      : 1925      MRN: [id number]  Encounter Provider: Obdulia Banuelos PA-C  Encounter Date: 10/29/2024   Encounter department: [facility] [place]    Assessment & Plan  Parkinson's disease ([facility])  Parkinson's disease complicated by dyskinesia and orthostatic hypotension.  She continues to have good tremor control on current dosing of medication.  She does not notice any clear wearing off between doses however she will notice the medicine kick in especially after her morning dose.  On exam today she was noted to have right sided dyskinesia as well as orthostatic hypotension.  Because of this we discussed hesitancy to increase her Sinemet dose further given this could worsen both of these issues.  At this time we have elected to remain on current Sinemet dose, 1 tab 3 times a day.  In the future if she were to notice wearing off we could consider 1 tab 4 times a day to see if this would be tolerated.    She was encouraged remain active.  She does do exercise and activity programs at her facility.  Using a walker for added stability.         Primary osteoarthritis of left shoulder  She is still having left arm pain which can be very bothersome to her.  She has pain with palpation of the shoulder joint. Managed by her PCP at the facility, on Tramadol.  If not tried in the past could consider a trial of Neurontin as she does complain of pain that radiates down the arm into the fingers at times as well, suggestive of nerve pain.   She was encouraged to continue with her exercises and activity              History of Present Illness   Bri Noel is a rosalinda 89 year old right handed woman who presents in follow up for Parkinson's disease. To review, symptom onset with right hand tremor which began at age 89. She does appear to be levodopa responsive.      At her last visit she was doing well, discussed increased Sinemet dose further however no changes  made due to issues with OH and dyskinesia.      INTERVAL HISTORY:  She resides at [facility]   She is in the assisted living facility, she has her own room   She has some tremors in the right arm which for the most part is well controlled   Some days are better than others   She gets some help with dressing and showering   She continues to have pain in the left arm at times, states she was told this was from her prior breast cancer surgery   She feels sleepy in general   Occasional lip tremor   She is often fidgety, notices it in the hands and legs   Appetite is not as good   Swallowing not as good as in the past, she does have some trouble at times, not choking     Current medications:  - Sinemet 25/100; 1 tab; TID 7a 1p 7p (additional ½ tab if needed)   - lexapro 10mg BID         Review of Systems  I have personally reviewed the MA's review of systems and made changes as necessary.      Objective     /60 (BP Location: Right arm, Patient Position: Standing, Cuff Size: Standard)   Pulse 85   LMP  (LMP Unknown)     Physical Exam  Eyes:      Extraocular Movements: EOM normal.   Pulmonary:      Effort: Pulmonary effort is normal.   Neurological:      Mental Status: She is alert.       Neurologic Exam     Mental Status   Level of consciousness: alert  Knowledge: good.     Cranial Nerves     CN III, IV, VI   Extraocular motions are normal.     CN V   Right facial sensation deficit: none  Left facial sensation deficit: none    CN VII   Right facial weakness: none  Left facial weakness: none    CN VIII   Hearing: intact    CN XII   Tongue deviation: none    Motor Exam   Overall muscle tone: increasedDecreased ROM of the left shoulder, pain with movement of the left arm   Pain with palpation of the left shoulder joint   Dyskinetic movements of the right leg and arm at times      Sensory Exam   Light touch normal.     Gait, Coordination, and Reflexes Ambulating with walker        UPDRS motor:                               Time since last dose:  10/29/24  6/5/24  8/18/21   Speech  1 1  0   Facial Expression  1 1     Rigidity - Neck   0  0   Rigidity - Upper Extremity (Right)  0 0  0   Rigidity - Upper Extremity (Left)   0 0  0   Rigidity - Lower Extremity (Right)  0 0  0   Rigidity - Lower Extremity (Left)   0 0  0   Finger Taps (Right)   2 2  2   Finger Taps (Left)   2 2  1   Hand Movement (Right)  1 1  2   Hand Movement (Left)   1 1  1   Pronation/Supination (Right)  1 2  2   Pronation/Supination (Left)   2 1  1   Toe Tapping (Right) 1 1  1   Toe Tapping (Left) 1 0  1   Leg Agility (Right)  1 1  0   Leg Agility (Left)   1 0  0   Arising from Chair   2 2  1   Gait   3 3  2   Freezing of Gait 0 0  0   Postural Stability          Posture 2 2  2   Global spontaneity of movement 1 1  1   Postural Tremor (Right) 0 0  0   Postural Tremor (Left) 0 0  0   Kinetic Tremor (Right)  0 0  0   Kinetic Tremor (Left)  0 0  0   Rest tremor amplitude RUE 1 1  3   Rest tremor amplitude LUE 0 0  0   Rest tremor amplitude RLE 0 0  0   Reset tremor amplitude LLE 0 0  0   Lip/Jaw Tremor  1 2     Consistency of tremor 2 2  2   Motor Exam Total:           Results/Data:

## 2025-01-10 ENCOUNTER — TELEPHONE (OUTPATIENT)
Age: OVER 89
End: 2025-01-10

## 2025-01-10 NOTE — TELEPHONE ENCOUNTER
Patients daughter Jennifer called and stated there was a missed call form Neurology; advised did not see any notes however was able to provide sooner appointment.  Jennifer accepted sooner appointment scheduled 3/11/25 10:30 AM BRANDON Harris.    Thank you

## 2025-01-31 ENCOUNTER — HOSPITAL ENCOUNTER (EMERGENCY)
Facility: HOSPITAL | Age: OVER 89
Discharge: HOME | End: 2025-02-01
Attending: EMERGENCY MEDICINE
Payer: MEDICARE

## 2025-01-31 ENCOUNTER — APPOINTMENT (EMERGENCY)
Dept: CT IMAGING | Facility: HOSPITAL | Age: OVER 89
End: 2025-01-31
Payer: MEDICARE

## 2025-01-31 ENCOUNTER — APPOINTMENT (EMERGENCY)
Dept: RADIOLOGY | Facility: HOSPITAL | Age: OVER 89
End: 2025-01-31
Payer: MEDICARE

## 2025-01-31 VITALS
RESPIRATION RATE: 18 BRPM | OXYGEN SATURATION: 93 % | SYSTOLIC BLOOD PRESSURE: 132 MMHG | DIASTOLIC BLOOD PRESSURE: 74 MMHG | TEMPERATURE: 97.6 F | HEART RATE: 80 BPM
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
## 2025-01-31 PROCEDURE — 70450 CT HEAD/BRAIN W/O DYE: CPT

## 2025-01-31 PROCEDURE — 73080 X-RAY EXAM OF ELBOW: CPT

## 2025-01-31 PROCEDURE — 72125 CT NECK SPINE W/O DYE: CPT

## 2025-01-31 PROCEDURE — 99284 EMERGENCY DEPT VISIT MOD MDM: CPT

## 2025-02-01 PROCEDURE — 99284 EMERGENCY DEPT VISIT MOD MDM: CPT | Performed by: EMERGENCY MEDICINE

## 2025-02-01 NOTE — ED ATTENDING ATTESTATION
1/31/2025  I, Enoc Day MD, saw and evaluated the patient. I have discussed the patient with the resident/non-physician practitioner and agree with the resident's/non-physician practitioner's findings, Plan of Care, and MDM as documented in the resident's/non-physician practitioner's note, except where noted. All available labs and Radiology studies were reviewed.  I was present for key portions of any procedure(s) performed by the resident/non-physician practitioner and I was immediately available to provide assistance.       At this point I agree with the current assessment done in the Emergency Department.  I have conducted an independent evaluation of this patient a history and physical is as follows:    Mechanical fall, head strike.  No trauma alert criteria.  CT head and cervical spine negative for acute abnormalities as read by [facility].    Skin tear to left elbow, no underlying fracture.  No acute medical symptoms.  No additional bony abnormalities.  Patient stable for discharge back to facility.    XR elbow 3+ vw LEFT   ED Interpretation by Roseline Simon MD (01/31 3115)   Wet read: Negative for acute fracture or dislocation      CT head without contrast    (Results Pending)   CT spine cervical without contrast    (Results Pending)         ED Course         Critical Care Time  Procedures       KPS 20% REQUIRES ASSISTANCE WITH ALL BASIC NEEDS.  LIVES HOME WITH DAUGHTER MARYBETH

## 2025-02-01 NOTE — DISCHARGE INSTRUCTIONS
Your CT head, CT C-spine, and x-ray of your left elbow did not reveal any acute injury.    The reports have been attached with your discharge paperwork    Please return to the emergency department for any worsening symptoms or new concerns

## 2025-02-01 NOTE — ED PROVIDER NOTES
ED Disposition       None          Assessment & Plan   {Hyperlinks  Risk Stratification - NIHSS - HEART SCORE - Fill out sepsis note and make sure you call [phone] if severe or septic shock:[id number]}    Medical Decision Making  PROCEDURE INFORMATION:   Exam: CT Head Without Contrast Exam date and time: 1/31/2025 11:18 PM Age: 89 years old Clinical indication: Injury or trauma; Fall; Blunt trauma (contusions or hematomas) TECHNIQUE: Imaging protocol: Computed tomography of the head without contrast. COMPARISON: CT HEAD WO CONTRAST 7/8/2023 3:45 PM   FINDINGS:   Brain: Volume loss and chronic small vessel ischemic change. No brain edema. No intracranial hemorrhage.   Cerebral ventricles: No ventriculomegaly.   Paranasal sinuses: Visualized sinuses are unremarkable. No fluid levels.   Mastoid air cells: Unremarkable.   Bones: Unremarkable. No acute fracture.   Soft tissues: Unremarkable.     IMPRESSION: No acute brain findings.      Amount and/or Complexity of Data Reviewed  Radiology: ordered and independent interpretation performed.             Medications - No data to display    ED Risk Strat Scores                                              History of Present Illness   {Hyperlinks  History (Med, Surg, Fam, Social) - Current Medications - Allergies  :[id number]}    Chief Complaint   Patient presents with    Fall     EMS from [facility] for a fall getting out of bed. -LOC/+HS/-thinners. Has history of stroke. Speech is slurred at baseline. No complaints       Past Medical History:   Diagnosis Date    Anxiety     Arthritis     Gastroparesis     History of atherosclerosis     History of breast cancer     History of osteoarthritis     Long term use of drug     Malignant neoplasm of breast (HCC) 04/11/2011    left w/mastectomy; age 85    Osteomalacia       Past Surgical History:   Procedure Laterality Date    BREAST BIOPSY Left 04/11/2011    u/s core-positive    HYSTERECTOMY  1974    age 49    MASTECTOMY Left  05/04/2011    malignant      Family History   Problem Relation Age of Onset    Coronary artery disease Mother     Coronary artery disease Family     Hyperlipidemia Family     Osteoarthritis Family     Lung cancer Sister 50      Social History     Tobacco Use    Smoking status: Never    Smokeless tobacco: Never   Vaping Use    Vaping status: Never Used   Substance Use Topics    Alcohol use: Yes     Comment: Social drinker    Drug use: No      E-Cigarette/Vaping    E-Cigarette Use Never User       E-Cigarette/Vaping Substances    Nicotine No     THC No     CBD No     Flavoring No     Other No     Unknown No       I have reviewed and agree with the history as documented.       Fall      Review of Systems        Objective   {Hyperlinks  Historical Vitals - Historical Labs - Chart Review/Microbiology - Last Echo - Code Status  :[id number]}    ED Triage Vitals [01/31/25 2124]   Temperature Pulse Blood Pressure Respirations SpO2 Patient Position - Orthostatic VS   97.6 °F (36.4 °C) 80 132/74 18 93 % Lying      Temp Source Heart Rate Source BP Location FiO2 (%) Pain Score    Oral Monitor Right arm -- --      Vitals      Date and Time Temp Pulse SpO2 Resp BP Pain Score FACES Pain Rating User   01/31/25 2124 97.6 °F (36.4 °C) 80 93 % 18 132/74 -- -- MD            Physical Exam    Results Reviewed       None            XR elbow 3+ vw LEFT   ED Interpretation by Roseline Simon MD (01/31 2334)   Wet read: Negative for acute fracture or dislocation      CT head without contrast    (Results Pending)   CT spine cervical without contrast    (Results Pending)       Procedures    ED Medication and Procedure Management   Prior to Admission Medications   Prescriptions Last Dose Informant Patient Reported? Taking?   Diclofenac Sodium (VOLTAREN) 1 %   No No   Sig: Apply 2 g topically every 12 (twelve) hours as needed (left shoulder pain/OA)   acetaminophen (TYLENOL) 325 mg tablet   No No   Sig: Take 3 tablets (975 mg total) by mouth  every 8 (eight) hours   amLODIPine (Norvasc) 2.5 mg tablet   No No   Sig: Take 1 tablet (2.5 mg total) by mouth daily   calcium citrate-vitamin D (CITRACAL+D) 315-200 MG-UNIT per tablet  Self Yes No   Sig: Take 1 tablet by mouth daily   carbidopa-levodopa (SINEMET)  mg per tablet   No No   Sig: Take 1 tablet by mouth 3 (three) times a day   chlorhexidine (PERIDEX) 0.12 % solution   Yes No   Sig: Use as directed    cholecalciferol (VITAMIN D3) 1,000 units tablet  Self Yes No   Sig: Take 3,000 Units by mouth daily    escitalopram (LEXAPRO) 10 mg tablet   No No   Sig: TAKE 1/2 TABLET BY MOUTH TWICE A DAY   traMADol (ULTRAM) 50 mg tablet   No No   Sig: TAKE 1 TABLET BY MOUTH EVERY MORNING TAKE 1 TABLET BY MOUTH EVERY EVENING OKAY TO TAKE 1 TABLET MID-DAY FOR PAIN IF NEEDED   Patient taking differently: Take 50 mg by mouth 3 (three) times a day TAKE 1 TABLET BY MOUTH  3 times a day   vitamin B-12 (VITAMIN B-12) 1,000 mcg tablet   Yes No   Sig: Take by mouth daily      Facility-Administered Medications: None     Patient's Medications   Discharge Prescriptions    No medications on file     No discharge procedures on file.  ED SEPSIS DOCUMENTATION          LEFT   ED Interpretation by Roseline Simon MD (01/31 5125)   Wet read: Negative for acute fracture or dislocation      Final Interpretation by Harjit Lares DO (02/02 3585)      No acute osseous abnormality.      Degenerative changes as described.         Computerized Assisted Algorithm (CAA) may have been used to analyze all applicable images.         Resident: Dionisio Hodge I, the attending radiologist, have reviewed the images and agree with the final report above.      Workstation performed: [id number]         CT head without contrast   Final Interpretation by Mary Jo Dejesus MD (02/01 0441)      No acute intracranial abnormality.                  Workstation performed: [id number]         CT spine cervical without contrast   Final Interpretation by Mary Jo Dejesus MD (02/01 0446)      No cervical spine fracture or traumatic malalignment.                  Workstation performed: [id number]             Procedures    ED Medication and Procedure Management   Prior to Admission Medications   Prescriptions Last Dose Informant Patient Reported? Taking?   Diclofenac Sodium (VOLTAREN) 1 %   No No   Sig: Apply 2 g topically every 12 (twelve) hours as needed (left shoulder pain/OA)   acetaminophen (TYLENOL) 325 mg tablet   No No   Sig: Take 3 tablets (975 mg total) by mouth every 8 (eight) hours   amLODIPine (Norvasc) 2.5 mg tablet   No No   Sig: Take 1 tablet (2.5 mg total) by mouth daily   calcium citrate-vitamin D (CITRACAL+D) 315-200 MG-UNIT per tablet  Self Yes No   Sig: Take 1 tablet by mouth daily   carbidopa-levodopa (SINEMET)  mg per tablet   No No   Sig: Take 1 tablet by mouth 3 (three) times a day   chlorhexidine (PERIDEX) 0.12 % solution   Yes No   Sig: Use as directed    cholecalciferol (VITAMIN D3) 1,000 units tablet  Self Yes No   Sig: Take 3,000 Units by mouth daily    escitalopram (LEXAPRO) 10 mg tablet   No No   Sig: TAKE 1/2 TABLET BY MOUTH TWICE A DAY   traMADol (ULTRAM) 50  mg tablet   No No   Sig: TAKE 1 TABLET BY MOUTH EVERY MORNING TAKE 1 TABLET BY MOUTH EVERY EVENING OKAY TO TAKE 1 TABLET MID-DAY FOR PAIN IF NEEDED   Patient taking differently: Take 50 mg by mouth 3 (three) times a day TAKE 1 TABLET BY MOUTH  3 times a day   vitamin B-12 (VITAMIN B-12) 1,000 mcg tablet   Yes No   Sig: Take by mouth daily      Facility-Administered Medications: None     Discharge Medication List as of 2/1/2025 12:09 AM        CONTINUE these medications which have NOT CHANGED    Details   acetaminophen (TYLENOL) 325 mg tablet Take 3 tablets (975 mg total) by mouth every 8 (eight) hours, Starting Mon 5/8/2023, No Print      amLODIPine (Norvasc) 2.5 mg tablet Take 1 tablet (2.5 mg total) by mouth daily, Starting Tue 9/26/2023, Normal      calcium citrate-vitamin D (CITRACAL+D) 315-200 MG-UNIT per tablet Take 1 tablet by mouth daily, Historical Med      carbidopa-levodopa (SINEMET)  mg per tablet Take 1 tablet by mouth 3 (three) times a day, Starting Tue 9/26/2023, Normal      chlorhexidine (PERIDEX) 0.12 % solution Use as directed , Starting Wed 3/6/2019, Historical Med      cholecalciferol (VITAMIN D3) 1,000 units tablet Take 3,000 Units by mouth daily , Historical Med      Diclofenac Sodium (VOLTAREN) 1 % Apply 2 g topically every 12 (twelve) hours as needed (left shoulder pain/OA), Starting Mon 11/27/2023, Normal      escitalopram (LEXAPRO) 10 mg tablet TAKE 1/2 TABLET BY MOUTH TWICE A DAY, Starting Mon 1/15/2024, Normal      traMADol (ULTRAM) 50 mg tablet TAKE 1 TABLET BY MOUTH EVERY MORNING TAKE 1 TABLET BY MOUTH EVERY EVENING OKAY TO TAKE 1 TABLET MID-DAY FOR PAIN IF NEEDED, Normal      vitamin B-12 (VITAMIN B-12) 1,000 mcg tablet Take by mouth daily, Historical Med           No discharge procedures on file.  ED SEPSIS DOCUMENTATION   Time reflects when diagnosis was documented in both MDM as applicable and the Disposition within this note       Time User Action Codes Description Comment     2/1/2025 12:08 AM Roseline Simon Add [W19.XXXA] Fall, initial encounter                  Roseline Simon MD  02/06/25 1110

## 2025-02-13 ENCOUNTER — HOSPITAL ENCOUNTER (EMERGENCY)
Facility: HOSPITAL | Age: OVER 89
Discharge: HOME | End: 2025-02-13
Attending: EMERGENCY MEDICINE
Payer: MEDICARE

## 2025-02-13 ENCOUNTER — APPOINTMENT (EMERGENCY)
Dept: CT IMAGING | Facility: HOSPITAL | Age: OVER 89
End: 2025-02-13
Payer: MEDICARE

## 2025-02-13 ENCOUNTER — APPOINTMENT (EMERGENCY)
Dept: RADIOLOGY | Facility: HOSPITAL | Age: OVER 89
End: 2025-02-13
Payer: MEDICARE

## 2025-02-13 VITALS
SYSTOLIC BLOOD PRESSURE: 164 MMHG | OXYGEN SATURATION: 98 % | DIASTOLIC BLOOD PRESSURE: 77 MMHG | HEART RATE: 75 BPM | TEMPERATURE: 97.4 F | RESPIRATION RATE: 16 BRPM

## 2025-02-13 DIAGNOSIS — I77.811 ECTATIC ABDOMINAL AORTA (HCC): ICD-10-CM
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
## 2025-02-13 DIAGNOSIS — K86.89 PANCREATIC DUCT DILATED: ICD-10-CM

## 2025-02-13 LAB
ALBUMIN SERPL BCG-MCNC: 3.7 G/DL (ref 3.5–5)
ALP SERPL-CCNC: 61 U/L (ref 34–104)
ALT SERPL W P-5'-P-CCNC: 4 U/L (ref 7–52)
ANION GAP SERPL CALCULATED.3IONS-SCNC: 3 MMOL/L (ref 4–13)
AST SERPL W P-5'-P-CCNC: 11 U/L (ref 13–39)
ATRIAL RATE: 76 BPM
BASOPHILS # BLD AUTO: 0.03 THOUSANDS/ΜL (ref 0–0.1)
BASOPHILS NFR BLD AUTO: 1 % (ref 0–1)
BILIRUB DIRECT SERPL-MCNC: 0.05 MG/DL (ref 0–0.2)
BILIRUB SERPL-MCNC: 0.41 MG/DL (ref 0.2–1)
BILIRUB UR QL STRIP: NEGATIVE
BUN SERPL-MCNC: 19 MG/DL (ref 5–25)
CALCIUM SERPL-MCNC: 9.2 MG/DL (ref 8.4–10.2)
CARDIAC TROPONIN I PNL SERPL HS: 3 NG/L (ref ?–50)
CHLORIDE SERPL-SCNC: 104 MMOL/L (ref 96–108)
CLARITY UR: CLEAR
CO2 SERPL-SCNC: 30 MMOL/L (ref 21–32)
COLOR UR: NORMAL
CREAT SERPL-MCNC: 0.73 MG/DL (ref 0.6–1.3)
EOSINOPHIL # BLD AUTO: 0.17 THOUSAND/ΜL (ref 0–0.61)
EOSINOPHIL NFR BLD AUTO: 3 % (ref 0–6)
ERYTHROCYTE [DISTWIDTH] IN BLOOD BY AUTOMATED COUNT: 12.3 % (ref 11.6–15.1)
GFR SERPL CREATININE-BSD FRML MDRD: 68 ML/MIN/1.73SQ M
GLUCOSE SERPL-MCNC: 84 MG/DL (ref 65–140)
GLUCOSE UR STRIP-MCNC: NEGATIVE MG/DL
HCT VFR BLD AUTO: 37.7 % (ref 34.8–46.1)
HGB BLD-MCNC: 12.6 G/DL (ref 11.5–15.4)
HGB UR QL STRIP.AUTO: NEGATIVE
IMM GRANULOCYTES # BLD AUTO: 0.02 THOUSAND/UL (ref 0–0.2)
IMM GRANULOCYTES NFR BLD AUTO: 0 % (ref 0–2)
KETONES UR STRIP-MCNC: NEGATIVE MG/DL
LEUKOCYTE ESTERASE UR QL STRIP: NEGATIVE
LYMPHOCYTES # BLD AUTO: 1.45 THOUSANDS/ΜL (ref 0.6–4.47)
LYMPHOCYTES NFR BLD AUTO: 28 % (ref 14–44)
MAGNESIUM SERPL-MCNC: 1.9 MG/DL (ref 1.9–2.7)
MCH RBC QN AUTO: 33.2 PG (ref 26.8–34.3)
MCHC RBC AUTO-ENTMCNC: 33.4 G/DL (ref 31.4–37.4)
MCV RBC AUTO: 100 FL (ref 82–98)
MONOCYTES # BLD AUTO: 0.59 THOUSAND/ΜL (ref 0.17–1.22)
MONOCYTES NFR BLD AUTO: 11 % (ref 4–12)
NEUTROPHILS # BLD AUTO: 2.92 THOUSANDS/ΜL (ref 1.85–7.62)
NEUTS SEG NFR BLD AUTO: 57 % (ref 43–75)
NITRITE UR QL STRIP: NEGATIVE
NRBC BLD AUTO-RTO: 0 /100 WBCS
P AXIS: 60 DEGREES
PH UR STRIP.AUTO: 7 [PH]
PLATELET # BLD AUTO: 195 THOUSANDS/UL (ref 149–390)
PMV BLD AUTO: 9.3 FL (ref 8.9–12.7)
POTASSIUM SERPL-SCNC: 4 MMOL/L (ref 3.5–5.3)
PR INTERVAL: 166 MS
PROT SERPL-MCNC: 6.1 G/DL (ref 6.4–8.4)
PROT UR STRIP-MCNC: NEGATIVE MG/DL
QRS AXIS: 53 DEGREES
QRSD INTERVAL: 88 MS
QT INTERVAL: 370 MS
QTC INTERVAL: 416 MS
RBC # BLD AUTO: 3.79 MILLION/UL (ref 3.81–5.12)
SODIUM SERPL-SCNC: 137 MMOL/L (ref 135–147)
SP GR UR STRIP.AUTO: 1.01 (ref 1–1.03)
T WAVE AXIS: 76 DEGREES
UROBILINOGEN UR STRIP-ACNC: <2 MG/DL
VENTRICULAR RATE: 76 BPM
WBC # BLD AUTO: 5.18 THOUSAND/UL (ref 4.31–10.16)

## 2025-02-13 PROCEDURE — 93010 ELECTROCARDIOGRAM REPORT: CPT | Performed by: INTERNAL MEDICINE

## 2025-02-13 PROCEDURE — 70450 CT HEAD/BRAIN W/O DYE: CPT

## 2025-02-13 PROCEDURE — 80076 HEPATIC FUNCTION PANEL: CPT | Performed by: EMERGENCY MEDICINE

## 2025-02-13 PROCEDURE — 71260 CT THORAX DX C+: CPT

## 2025-02-13 PROCEDURE — 72125 CT NECK SPINE W/O DYE: CPT

## 2025-02-13 PROCEDURE — 73030 X-RAY EXAM OF SHOULDER: CPT

## 2025-02-13 PROCEDURE — 96361 HYDRATE IV INFUSION ADD-ON: CPT

## 2025-02-13 PROCEDURE — 85025 COMPLETE CBC W/AUTO DIFF WBC: CPT | Performed by: EMERGENCY MEDICINE

## 2025-02-13 PROCEDURE — 73564 X-RAY EXAM KNEE 4 OR MORE: CPT

## 2025-02-13 PROCEDURE — 81003 URINALYSIS AUTO W/O SCOPE: CPT | Performed by: EMERGENCY MEDICINE

## 2025-02-13 PROCEDURE — 99285 EMERGENCY DEPT VISIT HI MDM: CPT | Performed by: EMERGENCY MEDICINE

## 2025-02-13 PROCEDURE — 96374 THER/PROPH/DIAG INJ IV PUSH: CPT

## 2025-02-13 PROCEDURE — 99285 EMERGENCY DEPT VISIT HI MDM: CPT

## 2025-02-13 PROCEDURE — 93005 ELECTROCARDIOGRAM TRACING: CPT

## 2025-02-13 PROCEDURE — 74177 CT ABD & PELVIS W/CONTRAST: CPT

## 2025-02-13 PROCEDURE — 83735 ASSAY OF MAGNESIUM: CPT | Performed by: EMERGENCY MEDICINE

## 2025-02-13 PROCEDURE — 84484 ASSAY OF TROPONIN QUANT: CPT | Performed by: EMERGENCY MEDICINE

## 2025-02-13 PROCEDURE — 80048 BASIC METABOLIC PNL TOTAL CA: CPT | Performed by: EMERGENCY MEDICINE

## 2025-02-13 PROCEDURE — 36415 COLL VENOUS BLD VENIPUNCTURE: CPT | Performed by: EMERGENCY MEDICINE

## 2025-02-13 RX ORDER — ACETAMINOPHEN 10 MG/ML
1000 INJECTION, SOLUTION INTRAVENOUS ONCE
Status: COMPLETED | OUTPATIENT
Start: 2025-02-13 | End: 2025-02-13

## 2025-02-13 RX ADMIN — SODIUM CHLORIDE 500 ML: 0.9 INJECTION, SOLUTION INTRAVENOUS at 16:09

## 2025-02-13 RX ADMIN — IOHEXOL 80 ML: 350 INJECTION, SOLUTION INTRAVENOUS at 14:49

## 2025-02-13 RX ADMIN — ACETAMINOPHEN 1000 MG: 10 INJECTION INTRAVENOUS at 16:22

## 2025-02-13 NOTE — ED PROVIDER NOTES
Time reflects when diagnosis was documented in both MDM as applicable and the Disposition within this note       Time User Action Codes Description Comment    2/13/2025  5:08 PM Celia Wakefield [W19.XXXA] Fall, initial encounter     2/13/2025  5:09 PM Celia Wakefield [K86.89] Pancreatic duct dilated     2/13/2025  5:10 PM Celia Wakefield [I77.811] Ectatic abdominal aorta (HCC)           ED Disposition       ED Disposition   Discharge    Condition   Stable    Date/Time   Thu Feb 13, 2025  5:08 PM    Comment   Bri Noel discharge to home/self care.                   Assessment & Plan       Medical Decision Making  89-year-old with lightheadedness and fall.  Will check EKG for arrhythmias, check electrolytes for dehydration and abnormality, check CBC for anemia and signs of infection, check urine for signs infection, CT head C-spine chest abdominal pelvis to eval for trauma.    Workup without significant abnormalities.  Incidental findings discussed with patient and patient's daughter.  They understand importance of following up.  Patient ambulated in the department, stable.  Pain controlled.  Will discharge with outpatient follow-up.    Amount and/or Complexity of Data Reviewed  Labs: ordered.  Radiology: ordered.    Risk  Prescription drug management.        ED Course as of 02/13/25 1823   Thu Feb 13, 2025   1227 ECG shows rate of 76, sinus, normal axis, normal QRS, no significant ST or T wave changes, normal intervals, independently interpreted by me       Medications   iohexol (OMNIPAQUE) 350 MG/ML injection (MULTI-DOSE) 80 mL (80 mL Intravenous Given 2/13/25 1449)   acetaminophen (Ofirmev) injection 1,000 mg (0 mg Intravenous Stopped 2/13/25 1637)   sodium chloride 0.9 % bolus 500 mL (0 mL Intravenous Stopped 2/13/25 1737)       ED Risk Strat Scores                                              History of Present Illness       Chief Complaint   Patient presents with    Fall     [facility], became dizzy, fell. +h/s no thinners or LOC. Gcs 15. C-collar applied       Past Medical History:   Diagnosis Date    Anxiety     Arthritis     Gastroparesis     History of atherosclerosis     History of breast cancer     History of osteoarthritis     Long term use of drug     Malignant neoplasm of breast (HCC) 04/11/2011    left w/mastectomy; age 85    Osteomalacia       Past Surgical History:   Procedure Laterality Date    BREAST BIOPSY Left 04/11/2011    u/s core-positive    HYSTERECTOMY  1974    age 49    MASTECTOMY Left 05/04/2011    malignant      Family History   Problem Relation Age of Onset    Coronary artery disease Mother     Coronary artery disease Family     Hyperlipidemia Family     Osteoarthritis Family     Lung cancer Sister 50      Social History     Tobacco Use    Smoking status: Never    Smokeless tobacco: Never   Vaping Use    Vaping status: Never Used   Substance Use Topics    Alcohol use: Yes     Comment: Social drinker    Drug use: No      E-Cigarette/Vaping    E-Cigarette Use Never User       E-Cigarette/Vaping Substances    Nicotine No     THC No     CBD No     Flavoring No     Other No     Unknown No       I have reviewed and agree with the history as documented.     Patient is a 89-year-old female presenting to the ER after a fall.  Queen lightheaded fell hit the back of her head.  Complaining of headache, neck pain, back pain.  Also complaining of left knee pain and left shoulder pain which patient states is chronic.  No chest or trouble breathing.  No abdominal pain.  No fevers or chills.  No vomiting.      History provided by:  Patient   used: No    Fall  Associated symptoms: headaches and neck pain    Associated symptoms: no abdominal pain, no chest pain, no nausea and no vomiting        Review of Systems   Constitutional:  Negative for chills, diaphoresis and fever.   Respiratory:  Negative for cough, shortness of breath, wheezing and stridor.     Cardiovascular:  Negative for chest pain, palpitations and leg swelling.   Gastrointestinal:  Negative for abdominal pain, blood in stool, diarrhea, nausea and vomiting.   Genitourinary:  Negative for dysuria, frequency and urgency.   Musculoskeletal:  Positive for neck pain. Negative for neck stiffness.   Skin:  Negative for pallor and rash.   Neurological:  Positive for headaches. Negative for dizziness, syncope, weakness and light-headedness.   All other systems reviewed and are negative.          Objective       ED Triage Vitals   Temperature Pulse Blood Pressure Respirations SpO2 Patient Position - Orthostatic VS   02/13/25 1230 02/13/25 1214 02/13/25 1214 02/13/25 1214 02/13/25 1214 02/13/25 1214   (!) 97.4 °F (36.3 °C) 77 153/72 16 95 % Sitting      Temp Source Heart Rate Source BP Location FiO2 (%) Pain Score    02/13/25 1230 02/13/25 1214 02/13/25 1214 -- --    Oral Monitor Right arm        Vitals      Date and Time Temp Pulse SpO2 Resp BP Pain Score FACES Pain Rating User   02/13/25 1605 -- 75 98 % -- 164/77 -- -- HR   02/13/25 1230 97.4 °F (36.3 °C) -- -- -- -- -- -- RI   02/13/25 1214 -- 77 95 % 16 153/72 -- -- RI            Physical Exam  Vitals reviewed.   Constitutional:       Appearance: Normal appearance. She is well-developed.   HENT:      Head: Normocephalic.      Comments: Bruising noted to back of the head, no laceration or active bleeding  Eyes:      Extraocular Movements: Extraocular movements intact.      Pupils: Pupils are equal, round, and reactive to light.   Neck:      Comments: CTL spine tenderness  Cardiovascular:      Rate and Rhythm: Normal rate and regular rhythm.      Heart sounds: Normal heart sounds.   Pulmonary:      Effort: Pulmonary effort is normal. No respiratory distress.      Breath sounds: Normal breath sounds.   Abdominal:      General: Bowel sounds are normal.      Palpations: Abdomen is soft.      Tenderness: There is no abdominal tenderness.   Musculoskeletal:          General: Tenderness present. No swelling. Normal range of motion.      Comments: Tenderness to the left upper rib cage.  Bruising noted to the left knee, range of motion is normal.  No open wounds.  Pain with ranging left shoulder, per patient chronic, no obvious skin changes.  Neurovascular intact distally throughout   Skin:     General: Skin is warm and dry.      Capillary Refill: Capillary refill takes less than 2 seconds.   Neurological:      General: No focal deficit present.      Mental Status: She is alert and oriented to person, place, and time.         Results Reviewed       Procedure Component Value Units Date/Time    UA w Reflex to Microscopic w Reflex to Culture [[id number]] Collected: 02/13/25 1432    Lab Status: Final result Specimen: Urine, Clean Catch Updated: 02/13/25 1459     Color, UA Light Yellow     Clarity, UA Clear     Specific Gravity, UA 1.012     pH, UA 7.0     Leukocytes, UA Negative     Nitrite, UA Negative     Protein, UA Negative mg/dl      Glucose, UA Negative mg/dl      Ketones, UA Negative mg/dl      Urobilinogen, UA <2.0 mg/dl      Bilirubin, UA Negative     Occult Blood, UA Negative    HS Troponin 0hr (reflex protocol) [[id number]]  (Normal) Collected: 02/13/25 1236    Lab Status: Final result Specimen: Blood from Arm, Right Updated: 02/13/25 1310     hs TnI 0hr 3 ng/L     Basic metabolic panel [[id number]]  (Abnormal) Collected: 02/13/25 1236    Lab Status: Final result Specimen: Blood from Arm, Right Updated: 02/13/25 1306     Sodium 137 mmol/L      Potassium 4.0 mmol/L      Chloride 104 mmol/L      CO2 30 mmol/L      ANION GAP 3 mmol/L      BUN 19 mg/dL      Creatinine 0.73 mg/dL      Glucose 84 mg/dL      Calcium 9.2 mg/dL      eGFR 68 ml/min/1.73sq m     Narrative:      National Kidney Disease Foundation guidelines for Chronic Kidney Disease (CKD):     Stage 1 with normal or high GFR (GFR > 90 mL/min/1.73 square meters)    Stage 2 Mild CKD (GFR = 60-89 mL/min/1.73 square  meters)    Stage 3A Moderate CKD (GFR = 45-59 mL/min/1.73 square meters)    Stage 3B Moderate CKD (GFR = 30-44 mL/min/1.73 square meters)    Stage 4 Severe CKD (GFR = 15-29 mL/min/1.73 square meters)    Stage 5 End Stage CKD (GFR <15 mL/min/1.73 square meters)  Note: GFR calculation is accurate only with a steady state creatinine    Hepatic function panel [[id number]]  (Abnormal) Collected: 02/13/25 1236    Lab Status: Final result Specimen: Blood from Arm, Right Updated: 02/13/25 1306     Total Bilirubin 0.41 mg/dL      Bilirubin, Direct 0.05 mg/dL      Alkaline Phosphatase 61 U/L      AST 11 U/L      ALT 4 U/L      Total Protein 6.1 g/dL      Albumin 3.7 g/dL     Magnesium [[id number]]  (Normal) Collected: 02/13/25 1236    Lab Status: Final result Specimen: Blood from Arm, Right Updated: 02/13/25 1306     Magnesium 1.9 mg/dL     CBC and differential [[id number]]  (Abnormal) Collected: 02/13/25 1236    Lab Status: Final result Specimen: Blood from Arm, Right Updated: 02/13/25 1250     WBC 5.18 Thousand/uL      RBC 3.79 Million/uL      Hemoglobin 12.6 g/dL      Hematocrit 37.7 %       fL      MCH 33.2 pg      MCHC 33.4 g/dL      RDW 12.3 %      MPV 9.3 fL      Platelets 195 Thousands/uL      nRBC 0 /100 WBCs      Segmented % 57 %      Immature Grans % 0 %      Lymphocytes % 28 %      Monocytes % 11 %      Eosinophils Relative 3 %      Basophils Relative 1 %      Absolute Neutrophils 2.92 Thousands/µL      Absolute Immature Grans 0.02 Thousand/uL      Absolute Lymphocytes 1.45 Thousands/µL      Absolute Monocytes 0.59 Thousand/µL      Eosinophils Absolute 0.17 Thousand/µL      Basophils Absolute 0.03 Thousands/µL             CT head without contrast   Final Interpretation by Miguelito Barr MD (02/13 1530)      1.  No acute intracranial abnormality.   2.  Stable chronic small vessel ischemic changes.         The study was marked in EPIC for immediate notification.      Workstation performed: [id number]          CT cervical spine without contrast   Final Interpretation by Miguelito Barr MD (02/13 1536)      No cervical spine fracture or traumatic malalignment.      The study was marked in EPIC for immediate notification.            Workstation performed: [id number]         CT chest abdomen pelvis w contrast   Final Interpretation by Miguelito Barr MD (02/13 7997)      1.  No evidence of acute traumatic injury within the chest, abdomen or pelvis.   2.  Focal pancreatic ductal dilatation within the mid pancreatic body measuring up to 6 mm, new since remote 2007 MRI comparison. Consider nonemergent follow-up MRI/MRCP for further evaluation. The decision to pursue further imaging should be anchored to    the patient's overall health and preferences.   3.  Additional nonacute findings, as above.      The study was marked in EPIC for immediate notification.         Workstation performed: [id number]         XR shoulder 2+ views LEFT   Final Interpretation by Farzad Rouse MD (02/13 1526)      No acute osseous abnormality.         Computerized Assisted Algorithm (CAA) may have been used to analyze all applicable images.         Workstation performed: [id number]         XR knee 4+ views left injury   Final Interpretation by Farzad Rouse MD (02/13 1528)      No acute osseous abnormality.         Computerized Assisted Algorithm (CAA) may have been used to analyze all applicable images.         Workstation performed: [id number]             Procedures    ED Medication and Procedure Management   Prior to Admission Medications   Prescriptions Last Dose Informant Patient Reported? Taking?   Diclofenac Sodium (VOLTAREN) 1 %   No No   Sig: Apply 2 g topically every 12 (twelve) hours as needed (left shoulder pain/OA)   acetaminophen (TYLENOL) 325 mg tablet   No No   Sig: Take 3 tablets (975 mg total) by mouth every 8 (eight) hours   amLODIPine (Norvasc) 2.5 mg tablet   No No   Sig: Take 1 tablet (2.5 mg total) by mouth daily    calcium citrate-vitamin D (CITRACAL+D) 315-200 MG-UNIT per tablet  Self Yes No   Sig: Take 1 tablet by mouth daily   carbidopa-levodopa (SINEMET)  mg per tablet   No No   Sig: Take 1 tablet by mouth 3 (three) times a day   chlorhexidine (PERIDEX) 0.12 % solution   Yes No   Sig: Use as directed    cholecalciferol (VITAMIN D3) 1,000 units tablet  Self Yes No   Sig: Take 3,000 Units by mouth daily    escitalopram (LEXAPRO) 10 mg tablet   No No   Sig: TAKE 1/2 TABLET BY MOUTH TWICE A DAY   traMADol (ULTRAM) 50 mg tablet   No No   Sig: TAKE 1 TABLET BY MOUTH EVERY MORNING TAKE 1 TABLET BY MOUTH EVERY EVENING OKAY TO TAKE 1 TABLET MID-DAY FOR PAIN IF NEEDED   Patient taking differently: Take 50 mg by mouth 3 (three) times a day TAKE 1 TABLET BY MOUTH  3 times a day   vitamin B-12 (VITAMIN B-12) 1,000 mcg tablet   Yes No   Sig: Take by mouth daily      Facility-Administered Medications: None     Discharge Medication List as of 2/13/2025  5:11 PM        CONTINUE these medications which have NOT CHANGED    Details   acetaminophen (TYLENOL) 325 mg tablet Take 3 tablets (975 mg total) by mouth every 8 (eight) hours, Starting Mon 5/8/2023, No Print      amLODIPine (Norvasc) 2.5 mg tablet Take 1 tablet (2.5 mg total) by mouth daily, Starting Tue 9/26/2023, Normal      calcium citrate-vitamin D (CITRACAL+D) 315-200 MG-UNIT per tablet Take 1 tablet by mouth daily, Historical Med      carbidopa-levodopa (SINEMET)  mg per tablet Take 1 tablet by mouth 3 (three) times a day, Starting Tue 9/26/2023, Normal      chlorhexidine (PERIDEX) 0.12 % solution Use as directed , Starting Wed 3/6/2019, Historical Med      cholecalciferol (VITAMIN D3) 1,000 units tablet Take 3,000 Units by mouth daily , Historical Med      Diclofenac Sodium (VOLTAREN) 1 % Apply 2 g topically every 12 (twelve) hours as needed (left shoulder pain/OA), Starting Mon 11/27/2023, Normal      escitalopram (LEXAPRO) 10 mg tablet TAKE 1/2 TABLET BY MOUTH  TWICE A DAY, Starting Mon 1/15/2024, Normal      traMADol (ULTRAM) 50 mg tablet TAKE 1 TABLET BY MOUTH EVERY MORNING TAKE 1 TABLET BY MOUTH EVERY EVENING OKAY TO TAKE 1 TABLET MID-DAY FOR PAIN IF NEEDED, Normal      vitamin B-12 (VITAMIN B-12) 1,000 mcg tablet Take by mouth daily, Historical Med           No discharge procedures on file.  ED SEPSIS DOCUMENTATION   Time reflects when diagnosis was documented in both MDM as applicable and the Disposition within this note       Time User Action Codes Description Comment    2/13/2025  5:08 PM Celia Wakefield [W19.XXXA] Fall, initial encounter     2/13/2025  5:09 PM Celia Wakefield [K86.89] Pancreatic duct dilated     2/13/2025  5:10 PM Celia Wakefield [I77.811] Ectatic abdominal aorta (HCC)                  Celia Wakefield MD  02/13/25 1826

## 2025-03-11 ENCOUNTER — OFFICE VISIT (OUTPATIENT)
Dept: NEUROLOGY | Facility: CLINIC | Age: OVER 89
End: 2025-03-11
Payer: MEDICARE

## 2025-03-11 VITALS
SYSTOLIC BLOOD PRESSURE: 78 MMHG | HEART RATE: 90 BPM | DIASTOLIC BLOOD PRESSURE: 52 MMHG | BODY MASS INDEX: 20.5 KG/M2 | HEIGHT: 66 IN

## 2025-03-11 DIAGNOSIS — G20.B1 PARKINSON'S DISEASE WITH DYSKINESIA WITHOUT FLUCTUATING MANIFESTATIONS (HCC): Primary | Chronic | ICD-10-CM

## 2025-03-11 DIAGNOSIS — M19.012 PRIMARY OSTEOARTHRITIS OF LEFT SHOULDER: ICD-10-CM

## 2025-03-11 PROCEDURE — 99214 OFFICE O/P EST MOD 30 MIN: CPT | Performed by: PHYSICIAN ASSISTANT

## 2025-03-11 PROCEDURE — G2211 COMPLEX E/M VISIT ADD ON: HCPCS | Performed by: PHYSICIAN ASSISTANT

## 2025-03-11 NOTE — PROGRESS NOTES
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  Overall she continues to have to have good tremor control on current dosing of medication. Chin tremor noted on exam today.  She feels that she has an on with the medication, perhaps some wearing off at times. On exam today she continues to have right sided dyskinesia as well as orthostatic hypotension.  Because of this we discussed hesitancy to increase her Sinemet dose further given this could worsen both of these issues.  At this time we have elected to remain on current Sinemet dose, 1 tab 3 times a day.  In the future if she were to notice wearing off we could consider 1 tab 4 times a day to see if this would be tolerated.     She was encouraged remain active.  She does do exercise and activity programs at her facility as well as PT/OT. Using a walker for added stability.       Primary osteoarthritis of left shoulder  She is still having left arm pain which can be very bothersome to her.  She has pain with palpation of the shoulder joint. If not tried in the past could consider a trial of Neurontin as she does complain of pain that radiates down the arm into the fingers at times as well, suggestive of nerve pain.                History of Present Illness   Bri Noel is a rosalinda 89 year old right handed woman who presents in follow up for Parkinson's disease. To review, symptom onset with right hand tremor which began at age 89. She does appear to be levodopa responsive.     At her last visit no changes were made to her medication, she was noted to have dyskinesia and OH in the office.     INTERVAL HISTORY:  She was seen in the ED twice  since her last visit for falls, 1/31 and 2/13  Most recent fall was secondary to feeling lightheadedness, cardiac workup performed, normal EKG  She states this fall was a slide off of the bed   She states since the last fall she was getting some room spinning dizziness, this is getting much better with time     She resides at [facility]   She is in the assisted living facility, she has her own room   Some days she feels better than others   Tremors are stable   She still has some pain down the left arm at times   She feels very tired at times   She gets help when she feels she needs   Shoulder movements still present   Daughter has noticed a change in smaller steps over the past 6-12 months, no clear shuffling   No clear hallucinations, may occasionally see something move in her peripheral vision     Current medications:  - Sinemet 25/100; 1 tab; TID 7a 1p 7p (additional ½ tab if needed)   - lexapro 10mg BID          Review of Systems   Constitutional:  Negative for appetite change, fatigue and fever.   HENT: Negative.  Negative for hearing loss, tinnitus, trouble swallowing and voice change.    Eyes: Negative.  Negative for photophobia, pain and visual disturbance.   Respiratory: Negative.  Negative for shortness of breath.    Cardiovascular: Negative.  Negative for palpitations.   Gastrointestinal: Negative.  Negative for nausea and vomiting.   Endocrine: Negative.  Negative for cold intolerance.   Genitourinary: Negative.  Negative for dysuria, frequency and urgency.   Musculoskeletal:  Positive for gait problem. Negative for back pain, myalgias, neck pain and neck stiffness.   Skin: Negative.  Negative for rash.   Allergic/Immunologic: Negative.    Neurological:  Positive for dizziness. Negative for tremors, seizures, syncope, facial asymmetry, speech difficulty, weakness, light-headedness, numbness and headaches.   Hematological: Negative.  Does not bruise/bleed easily.   Psychiatric/Behavioral: Negative.   "Negative for confusion, hallucinations and sleep disturbance.     I have personally reviewed the MA's review of systems and made changes as necessary.         Objective   Ht 5' 6\" (1.676 m)   LMP  (LMP Unknown)   BMI 20.50 kg/m²     Physical Exam  Constitutional:       General: She is awake.      Appearance: Normal appearance.   Eyes:      General: Lids are normal.      Extraocular Movements: Extraocular movements intact.      Pupils: Pupils are equal, round, and reactive to light.   Pulmonary:      Effort: Pulmonary effort is normal.   Neurological:      Mental Status: She is alert.       Neurological Exam  Mental Status  Awake and alert. Oriented to person, place and time.    Cranial Nerves  CN III, IV, VI: Extraocular movements intact bilaterally. Normal lids and orbits bilaterally. Pupils equal round and reactive to light bilaterally.  CN V:  Right: Facial sensation is normal.  Left: Facial sensation is normal on the left.  CN VIII:  Right: Hearing is decreased.  Left: Hearing is decreased.  CN XI: Shoulder shrug strength is normal.    Motor   Strength is 5/5 in all four extremities except as noted.  Difficulty with testing of the left arm due to pain with movement of the shoulder .    Sensory  Light touch is normal in upper and lower extremities.     Coordination  Right: Finger-to-nose abnormality:Left: Finger-to-nose abnormality:    Gait      Slow to rise from the chair and able to do so independently.  Not formally ambulated .    UPDRS motor:                              Time since last dose:  10/29/24  3/11/25   Speech  1 1   Facial Expression  1 1   Rigidity - Neck    0   Rigidity - Upper Extremity (Right)  0 0   Rigidity - Upper Extremity (Left)   0 0   Rigidity - Lower Extremity (Right)  0 0   Rigidity - Lower Extremity (Left)   0 0   Finger Taps (Right)   2 1   Finger Taps (Left)   2 2   Hand Movement (Right)  1 1   Hand Movement (Left)   1 1   Pronation/Supination (Right)  1 2   Pronation/Supination " (Left)   2 1   Toe Tapping (Right) 1 1   Toe Tapping (Left) 1 0   Leg Agility (Right)  1 1   Leg Agility (Left)   1 0   Arising from Chair   2 2   Gait   3    Freezing of Gait 0    Postural Stability         Posture 2 2   Global spontaneity of movement 1 1   Postural Tremor (Right) 0 0   Postural Tremor (Left) 0 0   Kinetic Tremor (Right)  0 0   Kinetic Tremor (Left)  0 0   Rest tremor amplitude RUE 1 0   Rest tremor amplitude LUE 0 0   Rest tremor amplitude RLE 0 0   Reset tremor amplitude LLE 0 0   Lip/Jaw Tremor  1 2   Consistency of tremor 2 2   Motor Exam Total:

## 2025-03-11 NOTE — ASSESSMENT & PLAN NOTE
She is still having left arm pain which can be very bothersome to her.  She has pain with palpation of the shoulder joint. If not tried in the past could consider a trial of Neurontin as she does complain of pain that radiates down the arm into the fingers at times as well, suggestive of nerve pain.

## 2025-03-11 NOTE — PATIENT INSTRUCTIONS
Parkinson's disease complicated by dyskinesia and orthostatic hypotension.  Overall she continues to have to have good tremor control on current dosing of medication. Chin tremor noted on exam today.  She feels that she has an on with the medication, perhaps some wearing off at times. On exam today she continues to have right sided dyskinesia as well as orthostatic hypotension.  Because of this we discussed hesitancy to increase her Sinemet dose further given this could worsen both of these issues.  At this time we have elected to remain on current Sinemet dose, 1 tab 3 times a day.  In the future if she were to notice wearing off we could consider 1 tab 4 times a day to see if this would be tolerated.     She was encouraged remain active.  She does do exercise and activity programs at her facility as well as PT/OT. Using a walker for added stability.     She is still having left arm pain which can be very bothersome to her.  She has pain with palpation of the shoulder joint. If not tried in the past could consider a trial of Neurontin as she does complain of pain that radiates down the arm into the fingers at times as well, suggestive of nerve pain.

## 2025-05-13 ENCOUNTER — VBI (OUTPATIENT)
Dept: ADMINISTRATIVE | Facility: OTHER | Age: OVER 89
End: 2025-05-13

## 2025-05-13 NOTE — TELEPHONE ENCOUNTER
Patient contacted to schedule Annual Wellness Visit.   Nursing home facility    Thank you.  Rosalie Price  [facility]

## 2025-05-14 NOTE — TELEPHONE ENCOUNTER
Message was left      Hi, this is [facility]. I'm calling for my mother, Daneila Gay. Her birthday is July 29th, 1925. She's still having some pain in her arm and needs a refill of a prescription ointment It's called diclofenac sodium and it says apply 2G topically as needed for the left shoulder pain every 12 hours. I would like it. The prescription sent to [facility] on [address], [facility] And the number there is [id number]. The prescription number is [id number] quantity 100. Thank you very much.  Bye, bye
oriented to person, place, time and situation

## 2025-05-30 ENCOUNTER — APPOINTMENT (EMERGENCY)
Dept: CT IMAGING | Facility: HOSPITAL | Age: OVER 89
End: 2025-05-30
Payer: MEDICARE

## 2025-05-30 ENCOUNTER — APPOINTMENT (EMERGENCY)
Dept: RADIOLOGY | Facility: HOSPITAL | Age: OVER 89
End: 2025-05-30
Payer: MEDICARE

## 2025-05-30 ENCOUNTER — HOSPITAL ENCOUNTER (INPATIENT)
Facility: HOSPITAL | Age: OVER 89
LOS: 4 days | Discharge: NURSING, REHAB OR LONG-TERM CARE | End: 2025-06-03
Attending: EMERGENCY MEDICINE | Admitting: SURGERY
Payer: MEDICARE

## 2025-05-30 ENCOUNTER — APPOINTMENT (INPATIENT)
Dept: CT IMAGING | Facility: HOSPITAL | Age: OVER 89
End: 2025-05-30
Payer: MEDICARE
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
## 2025-05-30 DIAGNOSIS — S02.2XXA NASAL BONE FRACTURES: ICD-10-CM

## 2025-05-30 DIAGNOSIS — G93.89 PNEUMOCEPHALUS: Primary | ICD-10-CM

## 2025-05-30 LAB
ABO GROUP BLD: NORMAL
ABO GROUP BLD: NORMAL
ALBUMIN SERPL BCG-MCNC: 4 G/DL (ref 3.5–5)
ALP SERPL-CCNC: 56 U/L (ref 34–104)
ALT SERPL W P-5'-P-CCNC: <3 U/L (ref 7–52)
ANION GAP SERPL CALCULATED.3IONS-SCNC: 7 MMOL/L (ref 4–13)
AST SERPL W P-5'-P-CCNC: 12 U/L (ref 13–39)
BASOPHILS # BLD AUTO: 0.03 THOUSANDS/ÂΜL (ref 0–0.1)
BASOPHILS NFR BLD AUTO: 1 % (ref 0–1)
BILIRUB SERPL-MCNC: 0.56 MG/DL (ref 0.2–1)
BILIRUB UR QL STRIP: NEGATIVE
BLD GP AB SCN SERPL QL: NEGATIVE
BUN SERPL-MCNC: 14 MG/DL (ref 5–25)
CALCIUM SERPL-MCNC: 9.2 MG/DL (ref 8.4–10.2)
CARDIAC TROPONIN I PNL SERPL HS: 3 NG/L (ref ?–50)
CHLORIDE SERPL-SCNC: 103 MMOL/L (ref 96–108)
CLARITY UR: CLEAR
CO2 SERPL-SCNC: 29 MMOL/L (ref 21–32)
COLOR UR: ABNORMAL
CREAT SERPL-MCNC: 0.61 MG/DL (ref 0.6–1.3)
EOSINOPHIL # BLD AUTO: 0.1 THOUSAND/ÂΜL (ref 0–0.61)
EOSINOPHIL NFR BLD AUTO: 2 % (ref 0–6)
ERYTHROCYTE [DISTWIDTH] IN BLOOD BY AUTOMATED COUNT: 12.4 % (ref 11.6–15.1)
GFR SERPL CREATININE-BSD FRML MDRD: 75 ML/MIN/1.73SQ M
GLUCOSE SERPL-MCNC: 95 MG/DL (ref 65–140)
GLUCOSE UR STRIP-MCNC: NEGATIVE MG/DL
HCT VFR BLD AUTO: 40.3 % (ref 34.8–46.1)
HGB BLD-MCNC: 13.5 G/DL (ref 11.5–15.4)
HGB UR QL STRIP.AUTO: NEGATIVE
IMM GRANULOCYTES # BLD AUTO: 0.02 THOUSAND/UL (ref 0–0.2)
IMM GRANULOCYTES NFR BLD AUTO: 0 % (ref 0–2)
KETONES UR STRIP-MCNC: ABNORMAL MG/DL
LEUKOCYTE ESTERASE UR QL STRIP: NEGATIVE
LYMPHOCYTES # BLD AUTO: 1.17 THOUSANDS/ÂΜL (ref 0.6–4.47)
LYMPHOCYTES NFR BLD AUTO: 21 % (ref 14–44)
MCH RBC QN AUTO: 33.3 PG (ref 26.8–34.3)
MCHC RBC AUTO-ENTMCNC: 33.5 G/DL (ref 31.4–37.4)
MCV RBC AUTO: 99 FL (ref 82–98)
MONOCYTES # BLD AUTO: 0.44 THOUSAND/ÂΜL (ref 0.17–1.22)
MONOCYTES NFR BLD AUTO: 8 % (ref 4–12)
NEUTROPHILS # BLD AUTO: 3.96 THOUSANDS/ÂΜL (ref 1.85–7.62)
NEUTS SEG NFR BLD AUTO: 68 % (ref 43–75)
NITRITE UR QL STRIP: NEGATIVE
NRBC BLD AUTO-RTO: 0 /100 WBCS
PH UR STRIP.AUTO: 6.5 [PH]
PLATELET # BLD AUTO: 210 THOUSANDS/UL (ref 149–390)
PMV BLD AUTO: 9.1 FL (ref 8.9–12.7)
POTASSIUM SERPL-SCNC: 4.2 MMOL/L (ref 3.5–5.3)
PROT SERPL-MCNC: 6.7 G/DL (ref 6.4–8.4)
PROT UR STRIP-MCNC: NEGATIVE MG/DL
RBC # BLD AUTO: 4.06 MILLION/UL (ref 3.81–5.12)
RH BLD: POSITIVE
RH BLD: POSITIVE
SODIUM SERPL-SCNC: 139 MMOL/L (ref 135–147)
SP GR UR STRIP.AUTO: 1.01 (ref 1–1.03)
SPECIMEN EXPIRATION DATE: NORMAL
UROBILINOGEN UR STRIP-ACNC: <2 MG/DL
WBC # BLD AUTO: 5.72 THOUSAND/UL (ref 4.31–10.16)

## 2025-05-30 PROCEDURE — 80053 COMPREHEN METABOLIC PANEL: CPT

## 2025-05-30 PROCEDURE — 73060 X-RAY EXAM OF HUMERUS: CPT

## 2025-05-30 PROCEDURE — 86900 BLOOD TYPING SEROLOGIC ABO: CPT

## 2025-05-30 PROCEDURE — 84484 ASSAY OF TROPONIN QUANT: CPT

## 2025-05-30 PROCEDURE — 99285 EMERGENCY DEPT VISIT HI MDM: CPT

## 2025-05-30 PROCEDURE — 71045 X-RAY EXAM CHEST 1 VIEW: CPT

## 2025-05-30 PROCEDURE — 70486 CT MAXILLOFACIAL W/O DYE: CPT

## 2025-05-30 PROCEDURE — 70480 CT ORBIT/EAR/FOSSA W/O DYE: CPT

## 2025-05-30 PROCEDURE — 86850 RBC ANTIBODY SCREEN: CPT

## 2025-05-30 PROCEDURE — 36415 COLL VENOUS BLD VENIPUNCTURE: CPT

## 2025-05-30 PROCEDURE — 81003 URINALYSIS AUTO W/O SCOPE: CPT

## 2025-05-30 PROCEDURE — 90471 IMMUNIZATION ADMIN: CPT

## 2025-05-30 PROCEDURE — 72125 CT NECK SPINE W/O DYE: CPT

## 2025-05-30 PROCEDURE — 96374 THER/PROPH/DIAG INJ IV PUSH: CPT

## 2025-05-30 PROCEDURE — 99285 EMERGENCY DEPT VISIT HI MDM: CPT | Performed by: EMERGENCY MEDICINE

## 2025-05-30 PROCEDURE — 70450 CT HEAD/BRAIN W/O DYE: CPT

## 2025-05-30 PROCEDURE — 85025 COMPLETE CBC W/AUTO DIFF WBC: CPT

## 2025-05-30 PROCEDURE — 70496 CT ANGIOGRAPHY HEAD: CPT

## 2025-05-30 PROCEDURE — 99222 1ST HOSP IP/OBS MODERATE 55: CPT | Performed by: SURGERY

## 2025-05-30 PROCEDURE — 70498 CT ANGIOGRAPHY NECK: CPT

## 2025-05-30 PROCEDURE — 86901 BLOOD TYPING SEROLOGIC RH(D): CPT

## 2025-05-30 PROCEDURE — 90715 TDAP VACCINE 7 YRS/> IM: CPT

## 2025-05-30 PROCEDURE — 93005 ELECTROCARDIOGRAM TRACING: CPT

## 2025-05-30 PROCEDURE — 73030 X-RAY EXAM OF SHOULDER: CPT

## 2025-05-30 RX ORDER — ACETAMINOPHEN 325 MG/1
975 TABLET ORAL EVERY 8 HOURS SCHEDULED
Status: DISCONTINUED | OUTPATIENT
Start: 2025-05-30 | End: 2025-06-03 | Stop reason: HOSPADM

## 2025-05-30 RX ORDER — POLYETHYLENE GLYCOL 3350 17 G/17G
17 POWDER, FOR SOLUTION ORAL DAILY
Status: DISCONTINUED | OUTPATIENT
Start: 2025-05-31 | End: 2025-06-03 | Stop reason: HOSPADM

## 2025-05-30 RX ORDER — CARBIDOPA AND LEVODOPA 25; 100 MG/1; MG/1
1 TABLET ORAL 3 TIMES DAILY
Status: DISCONTINUED | OUTPATIENT
Start: 2025-05-30 | End: 2025-06-03 | Stop reason: HOSPADM

## 2025-05-30 RX ORDER — AMLODIPINE BESYLATE 2.5 MG/1
2.5 TABLET ORAL DAILY
Status: DISCONTINUED | OUTPATIENT
Start: 2025-05-31 | End: 2025-06-03 | Stop reason: HOSPADM

## 2025-05-30 RX ORDER — ESCITALOPRAM OXALATE 10 MG/1
10 TABLET ORAL DAILY
Status: DISCONTINUED | OUTPATIENT
Start: 2025-05-31 | End: 2025-06-03 | Stop reason: HOSPADM

## 2025-05-30 RX ORDER — AMOXICILLIN 250 MG
1 CAPSULE ORAL
Status: DISCONTINUED | OUTPATIENT
Start: 2025-05-30 | End: 2025-06-03 | Stop reason: HOSPADM

## 2025-05-30 RX ORDER — HYDROMORPHONE HCL IN WATER/PF 6 MG/30 ML
0.2 PATIENT CONTROLLED ANALGESIA SYRINGE INTRAVENOUS EVERY 2 HOUR PRN
Refills: 0 | Status: DISCONTINUED | OUTPATIENT
Start: 2025-05-30 | End: 2025-06-03 | Stop reason: HOSPADM

## 2025-05-30 RX ORDER — ONDANSETRON 2 MG/ML
4 INJECTION INTRAMUSCULAR; INTRAVENOUS EVERY 6 HOURS PRN
Status: DISCONTINUED | OUTPATIENT
Start: 2025-05-30 | End: 2025-06-03 | Stop reason: HOSPADM

## 2025-05-30 RX ORDER — SENNOSIDES 8.6 MG
2 TABLET ORAL DAILY
Status: DISCONTINUED | OUTPATIENT
Start: 2025-05-31 | End: 2025-05-30

## 2025-05-30 RX ORDER — OXYCODONE HYDROCHLORIDE 5 MG/1
5 TABLET ORAL EVERY 4 HOURS PRN
Refills: 0 | Status: DISCONTINUED | OUTPATIENT
Start: 2025-05-30 | End: 2025-06-03 | Stop reason: HOSPADM

## 2025-05-30 RX ORDER — GINSENG 100 MG
1 CAPSULE ORAL 2 TIMES DAILY
Status: COMPLETED | OUTPATIENT
Start: 2025-05-30 | End: 2025-06-03

## 2025-05-30 RX ADMIN — TETANUS TOXOID, REDUCED DIPHTHERIA TOXOID AND ACELLULAR PERTUSSIS VACCINE, ADSORBED 0.5 ML: 5; 2.5; 8; 8; 2.5 SUSPENSION INTRAMUSCULAR at 16:19

## 2025-05-30 RX ADMIN — IOHEXOL 85 ML: 350 INJECTION, SOLUTION INTRAVENOUS at 23:39

## 2025-05-30 RX ADMIN — BACITRACIN 1 SMALL APPLICATION: 500 OINTMENT TOPICAL at 18:55

## 2025-05-30 RX ADMIN — SODIUM CHLORIDE 3 G: 9 INJECTION, SOLUTION INTRAVENOUS at 16:19

## 2025-05-30 RX ADMIN — SENNOSIDES AND DOCUSATE SODIUM 1 TABLET: 50; 8.6 TABLET ORAL at 21:36

## 2025-05-30 RX ADMIN — CARBIDOPA AND LEVODOPA 1 TABLET: 25; 100 TABLET ORAL at 21:36

## 2025-05-30 RX ADMIN — ACETAMINOPHEN 975 MG: 325 TABLET ORAL at 21:36

## 2025-05-30 NOTE — H&P
H&P - Trauma   Name: Bri Noel 89 y.o. female I MRN: [id number]  Unit/Bed#: ED-04 I Date of Admission: 5/30/2025   Date of Service: 5/30/2025 I Hospital Day: 0     Assessment & Plan  Nasal bone fractures  -CT facial bones mild bilateral distal nasal bone fractures  -No septal hematoma or active bleeding.   -OMFS consulted, appreciate recs  Pneumocephalus  -CTH: No definitive calvarial fracture identified although the presence of several small scattered locules of pneumocephalus are concerning for an occult nondisplaced calvarial fracture. Recommend a follow-up head CT in 4 to 6 hours.   -Periorbital ecchymosis and hemotympanum on exam  -Nsx consult, appreciate recs  -CTA H/N in 6 hours.     Fall from standing, initial encounter  -PT/OT    Trauma Alert: Other ED patient   Model of Arrival: Ambulance    Trauma Team: Attending Dr. Butler and Residents Dr. Lim  Consultants:     Neurosurgery: routine consult; Epic consult order placed;      Oral Maxillofacial: routine consult; Epic consult order placed;     History of Present Illness   Chief Complaint: Fall  Mechanism:Fall     Bri Noel is a 89 y.o. female who presents Fall.    89-year-old female presents with fall.  States that she was in the bathroom and has not recall any events and thinks she must of passed out falling forward onto her face.  States tenderness over the region of the glabella.  Denies other injuries.  Associated periorbital bruising and hematoma on the forehead.  Worked up in the emergency department with normal labs and EKG.  CT head notable for nasal fracture, and pneumocephalus.  Denies blood thinner use.    Denies fevers, chills, nausea, vomiting, headache, vision changes, chest pain, shortness of breath, abdominal pain, numbness, tingling, decreased range of motion, other injuries.      Review of Systems  Medical History Review: I have reviewed the patient's PMH, PSH, Social History, Family History, Meds, and Allergies   Historical  Information   Past Medical History[1]  Past Surgical History[2]  Social History[3]  E-Cigarette/Vaping    E-Cigarette Use Never User      E-Cigarette/Vaping Substances    Nicotine No     THC No     CBD No     Flavoring No     Other No     Unknown No      Family History[4]  Prior to Admission Medications   Prescriptions Last Dose Informant Patient Reported? Taking?   Diclofenac Sodium (VOLTAREN) 1 %   No No   Sig: Apply 2 g topically every 12 (twelve) hours as needed (left shoulder pain/OA)   Patient not taking: Reported on 3/11/2025   acetaminophen (TYLENOL) 325 mg tablet   No No   Sig: Take 3 tablets (975 mg total) by mouth every 8 (eight) hours   amLODIPine (Norvasc) 2.5 mg tablet   No No   Sig: Take 1 tablet (2.5 mg total) by mouth daily   calcium citrate-vitamin D (CITRACAL+D) 315-200 MG-UNIT per tablet  Self Yes No   Sig: Take 1 tablet by mouth daily   carbidopa-levodopa (SINEMET)  mg per tablet   No No   Sig: Take 1 tablet by mouth 3 (three) times a day   chlorhexidine (PERIDEX) 0.12 % solution   Yes No   Sig: Use as directed    Patient not taking: Reported on 3/11/2025   cholecalciferol (VITAMIN D3) 1,000 units tablet  Self Yes No   Sig: Take 3,000 Units by mouth daily    escitalopram (LEXAPRO) 10 mg tablet   No No   Sig: TAKE 1/2 TABLET BY MOUTH TWICE A DAY   traMADol (ULTRAM) 50 mg tablet   No No   Sig: TAKE 1 TABLET BY MOUTH EVERY MORNING TAKE 1 TABLET BY MOUTH EVERY EVENING OKAY TO TAKE 1 TABLET MID-DAY FOR PAIN IF NEEDED   Patient taking differently: Take 50 mg by mouth 3 (three) times a day TAKE 1 TABLET BY MOUTH  3 times a day   vitamin B-12 (VITAMIN B-12) 1,000 mcg tablet   Yes No   Sig: Take by mouth daily   Patient not taking: Reported on 3/11/2025      Facility-Administered Medications: None     Patient has no known allergies.  Immunization History   Administered Date(s) Administered    COVID-19 PFIZER VACCINE 0.3 ML IM 01/22/2021, 02/12/2021, 11/01/2021    COVID-19 Pfizer Vac BIVALENT  Reji-sucrose 12 Yr+ IM 02/16/2023    COVID-19 Pfizer mRNA vacc PF reji-sucrose 12 yr and older (Comirnaty) 01/22/2024    COVID-19 Pfizer vac (Reji-sucrose, gray cap) 12 yr+ IM 07/06/2022    INFLUENZA 10/21/2014, 09/30/2015, 09/20/2016, 11/14/2017, 10/06/2023    Influenza Quadrivalent Preservative Free 3 years and older IM 10/21/2014    Influenza Split High Dose Preservative Free IM 09/30/2015, 09/20/2016, 11/14/2017, 10/11/2019    Influenza, Seasonal Vaccine, Quadrivalent, Adjuvanted, .5e 09/26/2023    Influenza, high dose seasonal 0.7 mL 10/20/2018, 09/11/2020, 10/13/2021    Pneumococcal Conjugate 13-Valent 12/08/2015    Pneumococcal Polysaccharide PPV23 11/22/2019    Tdap 05/30/2025    Zoster 03/04/2013, 05/17/2018    Zoster Vaccine Recombinant 05/17/2018, 07/24/2018     Last Tetanus: Today      ISAR Score: Did you order a geriatric consult if the score was 2 or greater?: yes No data recorded       Objective :  Temp:  [97.7 °F (36.5 °C)] 97.7 °F (36.5 °C)  HR:  [87-95] 87  BP: ()/(69-94) 154/71  Resp:  [20] 20  SpO2:  [94 %-95 %] 94 %  O2 Device: None (Room air)    Initial Vitals:   Temperature: 97.7 °F (36.5 °C) (05/30/25 1243)  Pulse: 95 (05/30/25 1243)  Respirations: 20 (05/30/25 1243)  Blood Pressure: (!) 172/94 (05/30/25 1243)    Primary Survey:   Airway:        Status: patent;        Pre-hospital Interventions: none        Hospital Interventions: none  Breathing:        Pre-hospital Interventions: none       Effort: normal       Right breath sounds: normal       Left breath sounds: normal  Circulation:        Rhythm: regular no murmur          Right Pulses Left Pulses    R radial: 2+  R femoral: 2+  R pedal: 2+  R carotid: 2+   L radial: 2+  L femoral: 2+  L pedal: 2+  L carotid: 2+     Disability:        GCS: Eye: 4; Verbal: 5 Motor: 6 Total: 15       Right Pupil: 3 mm;  round;  reactive         Left Pupil:  3 mm;  round;  reactive      R Motor Strength L Motor Strength    R : 5/5  R dorsiflex:  5/5  R plantarflex: 5/5 L : 5/5  L dorsiflex: 5/5  L plantarflex: 5/5        Sensory:  No sensory deficit  Exposure:       Completed: Yes      Secondary Survey:  Physical Exam  Vitals reviewed.   Constitutional:       General: She is not in acute distress.     Appearance: Normal appearance. She is normal weight. She is not ill-appearing or toxic-appearing.   HENT:      Head: Normocephalic.      Right Ear: External ear normal. There is hemotympanum.      Left Ear: External ear normal. There is hemotympanum.      Ears:      Comments: Chahal signs negative.      Nose: Nose normal.      Comments: No septal hematoma     Mouth/Throat:      Mouth: Mucous membranes are moist.     Eyes:      Extraocular Movements: Extraocular movements intact.      Conjunctiva/sclera: Conjunctivae normal.      Pupils: Pupils are equal, round, and reactive to light.      Comments: Periorbital ecchymosis bilaterally.   Abrasion over the anterior nose.    Neck:      Comments: No midline C/T/L/S spine tenderness, step-offs, deformities.  Full range of motion of the cervical spine without tenderness.    Cardiovascular:      Rate and Rhythm: Normal rate and regular rhythm.      Pulses: Normal pulses.      Heart sounds: Normal heart sounds. No murmur heard.  Pulmonary:      Effort: Pulmonary effort is normal.      Breath sounds: Normal breath sounds.   Abdominal:      Palpations: Abdomen is soft.     Musculoskeletal:         General: No swelling, tenderness, deformity or signs of injury. Normal range of motion.      Cervical back: Normal range of motion and neck supple. No rigidity or tenderness.      Right lower leg: No edema.      Left lower leg: No edema.     Skin:     General: Skin is warm and dry.      Capillary Refill: Capillary refill takes less than 2 seconds.      Coloration: Skin is not jaundiced or pale.      Findings: Bruising present. No erythema, lesion or rash.      Comments: Abrasion/skin tear over the left posterior arm.       Neurological:      Mental Status: She is alert and oriented to person, place, and time. Mental status is at baseline.     Psychiatric:         Mood and Affect: Mood normal.         Behavior: Behavior normal.             Lab Results: I have reviewed the following results:  Recent Labs     05/30/25  1335   WBC 5.72   HGB 13.5   HCT 40.3      SODIUM 139   K 4.2      CO2 29   BUN 14   CREATININE 0.61   GLUC 95   AST 12*   ALT <3*   ALB 4.0   TBILI 0.56   ALKPHOS 56   HSTNI0 3       Imaging Results: I have personally reviewed pertinent images saved in PACS. CT scan findings (and other pertinent positive findings on images) were discussed with radiology. My interpretation of the images/reports are as follows:  Chest Xray(s): pending   FAST exam(s): N/A   CT Scan(s): 1.  Mild bilateral distal nasal bone fractures.  2.  Numerous small scattered locules of pneumocephalus are noted. The etiology of this pneumocephalus is not entirely clear. There is slight irregularity of the posterior wall of the right sphenoid sinus which is a potential source of this gas but a   definitive fracture is not identified with certainty.  3.  Large forehead/midline frontal scalp hematoma.   Additional Xray(s): pending     Other Studies: EKG normal sinus rhythm rate of 89, normal ME interval, normal QRS interval, QTc 430, normal axis, no ST elevation, no ST depressions, no ischemic changes or STEMI, no delta waves, no epsilon waves, no Brugada pattern, no prolonged QTc.       [1]   Past Medical History:  Diagnosis Date    Anxiety     Arthritis     Gastroparesis     History of atherosclerosis     History of breast cancer     History of osteoarthritis     Long term use of drug     Malignant neoplasm of breast (HCC) 04/11/2011    left w/mastectomy; age 85    Osteomalacia    [2]   Past Surgical History:  Procedure Laterality Date    BREAST BIOPSY Left 04/11/2011    u/s core-positive    HYSTERECTOMY  1974    age 49    MASTECTOMY Left  05/04/2011    malignant   [3]   Social History  Tobacco Use    Smoking status: Never    Smokeless tobacco: Never   Vaping Use    Vaping status: Never Used   Substance and Sexual Activity    Alcohol use: Not Currently     Comment: Social drinker    Drug use: No    Sexual activity: Not Currently   [4]   Family History  Problem Relation Name Age of Onset    Coronary artery disease Mother      Coronary artery disease Family      Hyperlipidemia Family      Osteoarthritis Family      Lung cancer Sister  50

## 2025-05-30 NOTE — ED PROVIDER NOTES
Time reflects when diagnosis was documented in both MDM as applicable and the Disposition within this note       Time User Action Codes Description Comment    5/30/2025  3:54 PM Kameron Hopper Add [W19.XXXA] Fall, initial encounter     5/30/2025  3:54 PM Kameron Hopper Add [S02.2XXA] Nasal bone fractures     5/30/2025  3:55 PM Kameron Hopper Add [G93.89] Pneumocephalus     5/30/2025  3:55 PM Kameron Hopper Modify [W19.XXXA] Fall, initial encounter     5/30/2025  3:55 PM Kameron Hopper Modify [G93.89] Pneumocephalus     5/30/2025  3:55 PM Kameron Hopper Remove [W19.XXXA] Fall, initial encounter     5/30/2025  3:55 PM Kameron Hopper Add [W19.XXXA] Fall from standing, initial encounter           ED Disposition       ED Disposition   Admit    Condition   Stable    Date/Time   Fri May 30, 2025  3:54 PM    Comment   Case was discussed with Trauma and the patient's admission status was agreed to be Admission Status: inpatient status to the service of Dr. Butler.               Assessment & Plan       Medical Decision Making  Differential diagnose includes but not limited to: Closed head injury, concussion, nasal fracture, facial bone fractures, ICH, syncope, arrhythmia, dysrhythmia    Patient came in due to unwitnessed fall at facility which was confirmed by facility staff by telephone by myself.  Patient was able to answer questions appropriately on exam.  She had large hematoma on her forehead between eyebrows and nasal swelling.  Patient was evaluated left arm pain, fluids x-rays were negative for any acute abnormalities.  CT head and facial bones positive for acute bilateral distal nasal bone fracture.  Also noted pneumocephalus of unknown source.  Radiologist recommended repeat CT scan in about 6 hours to assess for slow intracranial hemorrhage versus small occult calvarial fracture.  Discussed patient with trauma team and they accepted the patient for admission under their service for further  management.    Amount and/or Complexity of Data Reviewed  Labs: ordered.  Radiology: ordered.    Risk  Prescription drug management.  Decision regarding hospitalization.             Medications   ampicillin-sulbactam (UNASYN) 3 g in sodium chloride 0.9 % 100 mL IVPB (3 g Intravenous New Bag 5/30/25 1619)   senna (SENOKOT) tablet 17.2 mg (has no administration in time range)   polyethylene glycol (MIRALAX) packet 17 g (has no administration in time range)   ondansetron (ZOFRAN) injection 4 mg (has no administration in time range)   tetanus-diphtheria-acellular pertussis (BOOSTRIX) IM injection 0.5 mL (0.5 mL Intramuscular Given 5/30/25 1619)       ED Risk Strat Scores                    No data recorded        SBIRT 22yo+      Flowsheet Row Most Recent Value   Initial Alcohol Screen: US AUDIT-C     1. How often do you have a drink containing alcohol? 0 Filed at: 05/30/2025 1246   2. How many drinks containing alcohol do you have on a typical day you are drinking?  0 Filed at: 05/30/2025 1246   3a. Male UNDER 65: How often do you have five or more drinks on one occasion? 0 Filed at: 05/30/2025 1246   3b. FEMALE Any Age, or MALE 65+: How often do you have 4 or more drinks on one occassion? 0 Filed at: 05/30/2025 1246   Audit-C Score 0 Filed at: 05/30/2025 1246   TAMI: How many times in the past year have you...    Used an illegal drug or used a prescription medication for non-medical reasons? Never Filed at: 05/30/2025 1246                            History of Present Illness       Chief Complaint   Patient presents with    Fall     Pt arrives via EMS from [facility] (formerly [facility]) with c-collar applied, s/p trip and fall that was witnessed, pt was reported to be walking with walker when fell. C/o L arm pain, (+)skin tear posterior left arm. (+)HS, (-)LOC/thinner/asa.       Past Medical History[1]   Past Surgical History[2]   Family History[3]   Social History[4]   E-Cigarette/Vaping    E-Cigarette Use Never User        E-Cigarette/Vaping Substances    Nicotine No     THC No     CBD No     Flavoring No     Other No     Unknown No       I have reviewed and agree with the history as documented.     89-year-old female with history of Parkinson's presents after fall at her facility where she was unwitnessed and the facility heard a thud and she went and found the patient facedown.  Patient arrived in the ER was able to answer questions appropriately but states she just has a minor headache.  Patient also noted left upper arm pain.  Patient denied vision changes, or any other symptoms at this time.        Review of Systems   Musculoskeletal:  Positive for arthralgias (Left upper arm).   Neurological:  Positive for headaches.   All other systems reviewed and are negative.          Objective       ED Triage Vitals [05/30/25 1243]   Temperature Pulse Blood Pressure Respirations SpO2 Patient Position - Orthostatic VS   97.7 °F (36.5 °C) 95 (!) 172/94 20 94 % Lying      Temp Source Heart Rate Source BP Location FiO2 (%) Pain Score    Axillary Monitor Right arm -- --      Vitals      Date and Time Temp Pulse SpO2 Resp BP Pain Score FACES Pain Rating User   05/30/25 1500 -- 87 94 % 20 154/71 -- -- AG   05/30/25 1430 -- 89 95 % 20 92/69 -- -- AG   05/30/25 1243 97.7 °F (36.5 °C) 95 94 % 20 172/94 -- -- AG            Physical Exam  HENT:      Head: Raccoon eyes, abrasion (Tip of nose), right periorbital erythema and left periorbital erythema present. No Chahal's sign.      Nose: Nasal deformity present.         Results Reviewed       Procedure Component Value Units Date/Time    UA w Reflex to Microscopic w Reflex to Culture [[id number]]  (Abnormal) Collected: 05/30/25 1544    Lab Status: Final result Specimen: Urine, Clean Catch Updated: 05/30/25 1552     Color, UA Light Yellow     Clarity, UA Clear     Specific Gravity, UA 1.013     pH, UA 6.5     Leukocytes, UA Negative     Nitrite, UA Negative     Protein, UA Negative mg/dl      Glucose, UA  Negative mg/dl      Ketones, UA Trace mg/dl      Urobilinogen, UA <2.0 mg/dl      Bilirubin, UA Negative     Occult Blood, UA Negative    HS Troponin 0hr (reflex protocol) [[id number]]  (Normal) Collected: 05/30/25 1335    Lab Status: Final result Specimen: Blood from Arm, Right Updated: 05/30/25 1416     hs TnI 0hr 3 ng/L     Comprehensive metabolic panel [[id number]]  (Abnormal) Collected: 05/30/25 1335    Lab Status: Final result Specimen: Blood from Arm, Right Updated: 05/30/25 1408     Sodium 139 mmol/L      Potassium 4.2 mmol/L      Chloride 103 mmol/L      CO2 29 mmol/L      ANION GAP 7 mmol/L      BUN 14 mg/dL      Creatinine 0.61 mg/dL      Glucose 95 mg/dL      Calcium 9.2 mg/dL      AST 12 U/L      ALT <3 U/L      Alkaline Phosphatase 56 U/L      Total Protein 6.7 g/dL      Albumin 4.0 g/dL      Total Bilirubin 0.56 mg/dL      eGFR 75 ml/min/1.73sq m     Narrative:      National Kidney Disease Foundation guidelines for Chronic Kidney Disease (CKD):     Stage 1 with normal or high GFR (GFR > 90 mL/min/1.73 square meters)    Stage 2 Mild CKD (GFR = 60-89 mL/min/1.73 square meters)    Stage 3A Moderate CKD (GFR = 45-59 mL/min/1.73 square meters)    Stage 3B Moderate CKD (GFR = 30-44 mL/min/1.73 square meters)    Stage 4 Severe CKD (GFR = 15-29 mL/min/1.73 square meters)    Stage 5 End Stage CKD (GFR <15 mL/min/1.73 square meters)  Note: GFR calculation is accurate only with a steady state creatinine    CBC and differential [[id number]]  (Abnormal) Collected: 05/30/25 1335    Lab Status: Final result Specimen: Blood from Arm, Right Updated: 05/30/25 1347     WBC 5.72 Thousand/uL      RBC 4.06 Million/uL      Hemoglobin 13.5 g/dL      Hematocrit 40.3 %      MCV 99 fL      MCH 33.3 pg      MCHC 33.5 g/dL      RDW 12.4 %      MPV 9.1 fL      Platelets 210 Thousands/uL      nRBC 0 /100 WBCs      Segmented % 68 %      Immature Grans % 0 %      Lymphocytes % 21 %      Monocytes % 8 %      Eosinophils Relative 2 %       Basophils Relative 1 %      Absolute Neutrophils 3.96 Thousands/µL      Absolute Immature Grans 0.02 Thousand/uL      Absolute Lymphocytes 1.17 Thousands/µL      Absolute Monocytes 0.44 Thousand/µL      Eosinophils Absolute 0.10 Thousand/µL      Basophils Absolute 0.03 Thousands/µL             CT head without contrast   Final Interpretation by Miguelito Barr MD (05/30 7376)      1.  No intracranial hemorrhage.   2.  No definitive calvarial fracture identified although the presence of several small scattered locules of pneumocephalus are concerning for an occult nondisplaced calvarial fracture. Recommend a follow-up head CT in 4 to 6 hours.   3.  Large forehead/midline frontal scalp hematoma.   4.  Small amount of layering blood products within the posterior ethmoid air cells and sphenoid sinuses bilaterally. Irregularity of the distal nasal bones are compatible with fractures.            I personally discussed this study with KEYLA EVERETT on 5/30/2025 3:39 PM.                     Workstation performed: [id number]         CT spine cervical without contrast   Final Interpretation by Miguelito Barr MD (05/30 9221)      No cervical spine fracture or traumatic malalignment.      The study was marked in EPIC for immediate notification.            Workstation performed: [id number]         CT facial bones without contrast   Final Interpretation by Miguelito Barr MD (05/30 5871)      1.  Mild bilateral distal nasal bone fractures.   2.  Numerous small scattered locules of pneumocephalus are noted. The etiology of this pneumocephalus is not entirely clear. There is slight irregularity of the posterior wall of the right sphenoid sinus which is a potential source of this gas but a    definitive fracture is not identified with certainty.   3.  Large forehead/midline frontal scalp hematoma.      The study was marked in EPIC for immediate notification.      Workstation performed: [id number]         XR chest 1 view portable     (Results Pending)   XR humerus LEFT    (Results Pending)   XR shoulder 2+ views LEFT    (Results Pending)   CTA head and neck w wo contrast    (Results Pending)       ECG 12 Lead Documentation Only    Date/Time: 5/30/2025 4:41 PM    Performed by: Kameron Hopper DO  Authorized by: Kameron Hopper DO    Indications / Diagnosis:  Fall, unknown syncope  Patient location:  ED  Previous ECG:     Previous ECG:  Compared to current    Similarity:  No change  Interpretation:     Interpretation: normal    Rate:     ECG rate:  89    ECG rate assessment: normal    Rhythm:     Rhythm: sinus rhythm    Ectopy:     Ectopy: none    QRS:     QRS axis:  Normal    QRS intervals:  Normal  Conduction:     Conduction: normal    ST segments:     ST segments:  Normal  T waves:     T waves: normal        ED Medication and Procedure Management   Prior to Admission Medications   Prescriptions Last Dose Informant Patient Reported? Taking?   Diclofenac Sodium (VOLTAREN) 1 %   No No   Sig: Apply 2 g topically every 12 (twelve) hours as needed (left shoulder pain/OA)   Patient not taking: Reported on 3/11/2025   acetaminophen (TYLENOL) 325 mg tablet   No No   Sig: Take 3 tablets (975 mg total) by mouth every 8 (eight) hours   amLODIPine (Norvasc) 2.5 mg tablet   No No   Sig: Take 1 tablet (2.5 mg total) by mouth daily   calcium citrate-vitamin D (CITRACAL+D) 315-200 MG-UNIT per tablet  Self Yes No   Sig: Take 1 tablet by mouth daily   carbidopa-levodopa (SINEMET)  mg per tablet   No No   Sig: Take 1 tablet by mouth 3 (three) times a day   chlorhexidine (PERIDEX) 0.12 % solution   Yes No   Sig: Use as directed    Patient not taking: Reported on 3/11/2025   cholecalciferol (VITAMIN D3) 1,000 units tablet  Self Yes No   Sig: Take 3,000 Units by mouth daily    escitalopram (LEXAPRO) 10 mg tablet   No No   Sig: TAKE 1/2 TABLET BY MOUTH TWICE A DAY   traMADol (ULTRAM) 50 mg tablet   No No   Sig: TAKE 1 TABLET BY MOUTH EVERY  MORNING TAKE 1 TABLET BY MOUTH EVERY EVENING OKAY TO TAKE 1 TABLET MID-DAY FOR PAIN IF NEEDED   Patient taking differently: Take 50 mg by mouth 3 (three) times a day TAKE 1 TABLET BY MOUTH  3 times a day   vitamin B-12 (VITAMIN B-12) 1,000 mcg tablet   Yes No   Sig: Take by mouth daily   Patient not taking: Reported on 3/11/2025      Facility-Administered Medications: None     Patient's Medications   Discharge Prescriptions    No medications on file     No discharge procedures on file.  ED SEPSIS DOCUMENTATION   Time reflects when diagnosis was documented in both MDM as applicable and the Disposition within this note       Time User Action Codes Description Comment    5/30/2025  3:54 PM Kameron Hopper Add [W19.XXXA] Fall, initial encounter     5/30/2025  3:54 PM Kameron Hopper Add [S02.2XXA] Nasal bone fractures     5/30/2025  3:55 PM Kameron Hopper Add [G93.89] Pneumocephalus     5/30/2025  3:55 PM Kameron Hopper Modify [W19.XXXA] Fall, initial encounter     5/30/2025  3:55 PM Kameron Hopper Modify [G93.89] Pneumocephalus     5/30/2025  3:55 PM Kameron Hopper Remove [W19.XXXA] Fall, initial encounter     5/30/2025  3:55 PM Kameron Hopper Add [W19.XXXA] Fall from standing, initial encounter                    Kameron Hopper DO  05/30/25 1645         [1]   Past Medical History:  Diagnosis Date    Anxiety     Arthritis     Gastroparesis     History of atherosclerosis     History of breast cancer     History of osteoarthritis     Long term use of drug     Malignant neoplasm of breast (HCC) 04/11/2011    left w/mastectomy; age 85    Osteomalacia    [2]   Past Surgical History:  Procedure Laterality Date    BREAST BIOPSY Left 04/11/2011    u/s core-positive    HYSTERECTOMY  1974    age 49    MASTECTOMY Left 05/04/2011    malignant   [3]   Family History  Problem Relation Name Age of Onset    Coronary artery disease Mother      Coronary artery disease Family      Hyperlipidemia Family       Osteoarthritis Family      Lung cancer Sister  50   [4]   Social History  Tobacco Use    Smoking status: Never    Smokeless tobacco: Never   Vaping Use    Vaping status: Never Used   Substance Use Topics    Alcohol use: Not Currently     Comment: Social drinker    Drug use: No        Kameron Hopper,   05/30/25 7503

## 2025-05-31 PROBLEM — G93.89 PNEUMOCEPHALUS: Status: ACTIVE | Noted: 2025-05-31

## 2025-05-31 PROBLEM — S02.2XXA CLOSED FRACTURE OF NASAL BONE: Status: ACTIVE | Noted: 2025-05-31

## 2025-05-31 PROBLEM — M25.512 ACUTE PAIN OF LEFT SHOULDER: Status: ACTIVE | Noted: 2025-05-31
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
## 2025-05-31 PROCEDURE — 97163 PT EVAL HIGH COMPLEX 45 MIN: CPT

## 2025-05-31 PROCEDURE — 99232 SBSQ HOSP IP/OBS MODERATE 35: CPT | Performed by: SURGERY

## 2025-05-31 PROCEDURE — 99223 1ST HOSP IP/OBS HIGH 75: CPT | Performed by: PHYSICIAN ASSISTANT

## 2025-05-31 RX ORDER — SODIUM CHLORIDE 9 MG/ML
50 INJECTION, SOLUTION INTRAVENOUS CONTINUOUS
Status: DISCONTINUED | OUTPATIENT
Start: 2025-05-31 | End: 2025-05-31

## 2025-05-31 RX ORDER — HYDRALAZINE HYDROCHLORIDE 20 MG/ML
5 INJECTION INTRAMUSCULAR; INTRAVENOUS EVERY 4 HOURS PRN
Status: DISCONTINUED | OUTPATIENT
Start: 2025-05-31 | End: 2025-06-03 | Stop reason: HOSPADM

## 2025-05-31 RX ADMIN — AMLODIPINE BESYLATE 2.5 MG: 2.5 TABLET ORAL at 08:20

## 2025-05-31 RX ADMIN — CARBIDOPA AND LEVODOPA 1 TABLET: 25; 100 TABLET ORAL at 17:20

## 2025-05-31 RX ADMIN — ACETAMINOPHEN 975 MG: 325 TABLET ORAL at 14:37

## 2025-05-31 RX ADMIN — SODIUM CHLORIDE 50 ML/HR: 0.9 INJECTION, SOLUTION INTRAVENOUS at 02:28

## 2025-05-31 RX ADMIN — CARBIDOPA AND LEVODOPA 1 TABLET: 25; 100 TABLET ORAL at 21:55

## 2025-05-31 RX ADMIN — CARBIDOPA AND LEVODOPA 1 TABLET: 25; 100 TABLET ORAL at 08:20

## 2025-05-31 RX ADMIN — Medication 2000 UNITS: at 08:22

## 2025-05-31 RX ADMIN — ACETAMINOPHEN 975 MG: 325 TABLET ORAL at 08:19

## 2025-05-31 RX ADMIN — ACETAMINOPHEN 975 MG: 325 TABLET ORAL at 21:54

## 2025-05-31 RX ADMIN — BACITRACIN 1 SMALL APPLICATION: 500 OINTMENT TOPICAL at 08:22

## 2025-05-31 RX ADMIN — POLYETHYLENE GLYCOL 3350 17 G: 17 POWDER, FOR SOLUTION ORAL at 08:21

## 2025-05-31 RX ADMIN — ESCITALOPRAM OXALATE 10 MG: 10 TABLET ORAL at 08:20

## 2025-05-31 RX ADMIN — BACITRACIN 1 SMALL APPLICATION: 500 OINTMENT TOPICAL at 17:20

## 2025-05-31 NOTE — ASSESSMENT & PLAN NOTE
-CTH: No definitive calvarial fracture identified although the presence of several small scattered locules of pneumocephalus are concerning for an occult nondisplaced calvarial fracture. Recommend a follow-up head CT in 4 to 6 hours.   -Periorbital ecchymosis and hemotympanum on exam  -Nsx consult, appreciate recs  -CTA H/N in 6 hours.

## 2025-05-31 NOTE — ASSESSMENT & PLAN NOTE
- Patient with left shoulder and upper arm pain on presentation in setting of chronic history of left shoulder osteoarthritis.  - X-ray imaging from 5/30/2025 reviewed with no evidence of acute traumatic injury or osseous abnormality; degenerative changes were noted.  - May remain weightbearing as tolerated on the left upper extremity with activity as tolerated.  - Analgesia as needed.  - May use ice for pain and swelling.  - PT and OT evaluation shims indicated.  - Outpatient follow-up with Orthopedic surgery recommended.

## 2025-05-31 NOTE — ASSESSMENT & PLAN NOTE
Intracranial pneumocephalus  Status post mechanical fall with head strike 5/30; thankfully no intracranial hemorrhage noted  Imaging without overt signs of calvarial fracture although there is concern for sphenoid sinus bony injury as the cause of pneumocephalus  Does have mild bilateral distal nasal bone fractures as well for which OMFS is consulted  No reported use of anticoagulation or antiplatelet therapies    Imaging:  CTA head and neck with and without contrast 5/30/2025: Interval decrease in pneumocephalus, few residual bubbles remaining around the cribriform plate.  Fluid levels in the sphenoid sinuses could be a sentinel finding for bony injury, although there are still no discrete fracture planes evident.  No new acute brain parenchymal or extra-axial findings.  CT head without contrast 5/30/2025: No intracranial hemorrhage.  No definitive calvarial fracture identified although the presence of several small scattered locules of pneumocephalus are concerning for an occult nondisplaced calvarial fracture.    Plan:  Imaging reviewed, scattered pneumocephalus which has improved on subsequent imaging likely from occult sphenoid sinus injury.  No surgical intervention is warranted for this.  Repeat CT head STAT if GCS declines more than 2 points in 1 hour  BP parameters per primary team however would recommend SBP < 160  PLT > 100, INR < 1.4  No need for AED management  Continue to monitor neuro exam  Medical management and pain control per primary team  DVT ppx:  SCDs, okay for pharmacological DVT prophylaxis from neurosurgery standpoint  Mobilize as tolerated with assistance, PT / OT evaluation    Neurosurgery will sign off.  Outpatient follow-up in 2 weeks time with repeat CT head to ensure ongoing resolution of pneumocephalus.  Please call with questions or concerns.

## 2025-05-31 NOTE — PLAN OF CARE
Problem: PHYSICAL THERAPY ADULT  Goal: Performs mobility at highest level of function for planned discharge setting.  See evaluation for individualized goals.  Description: Treatment/Interventions: ADL retraining, Functional transfer training, LE strengthening/ROM, Therapeutic exercise, Endurance training, Cognitive reorientation, Patient/family training, Equipment eval/education, Bed mobility, Gait training, Compensatory technique education, Spoke to nursing, Spoke to case management          See flowsheet documentation for full assessment, interventions and recommendations.  Note:    Problem List: Decreased strength, Decreased endurance, Impaired balance, Decreased mobility, Decreased coordination, Decreased cognition, Impaired judgement, Decreased safety awareness  Assessment: Patient seen for Physical Therapy evaluation. Patient admitted with Pneumocephalus.  Comorbidities affecting patient's physical performance include: Parkinson's, HTN, OA L shld,lumbar radiculopathy, osteoporosis, Vit D def, COVID.  Personal factors affecting patient at time of initial evaluation include: ambulating with assistive device, inability to ambulate household distances, inability to navigate community distances, inability to navigate level surfaces without external assistance, inability to perform dynamic tasks in community, decreased cognition, positive fall history, decreased initiation and engagement, and limited insight into impairments. Prior to admission, patient was independent with functional mobility with rollator, requiring assist for ADLS, requiring assist for IADLS, living in ILF, and ambulating household distance.  Please find objective findings from Physical Therapy assessment regarding body systems outlined above with impairments and limitations including weakness, impaired balance, decreased endurance, impaired coordination, decreased activity tolerance, decreased functional mobility tolerance, decreased safety  awareness, impaired judgement, fall risk, and decreased cognition.  The Barthel Index was used as a functional outcome tool presenting with a score of Barthel Index Score: 20 today indicating marked limitations of functional mobility and ADLS.  Patient's clinical presentation is currently unstable/unpredictable as seen in patient's presentation of vital sign response, varying levels of cognitive performance, increased fall risk, new onset of impairment of functional mobility, decreased endurance, and new onset of weakness. Pt would benefit from continued Physical Therapy treatment to address deficits as defined above and maximize level of functional mobility. As demonstrated by objective findings, the assigned level of complexity for this evaluation is high.The patient's -[facility] Basic Mobility Inpatient Short Form Raw Score is 11. A Raw score of less than or equal to 16 suggests the patient may benefit from discharge to post-acute rehabilitation services. Please also refer to the recommendation of the Physical Therapist for safe discharge planning.        Rehab Resource Intensity Level, PT: II (Moderate Resource Intensity)    See flowsheet documentation for full assessment.

## 2025-05-31 NOTE — CONSULTS
Patient Name: Bri Noel YOB: 1925    Medical Record No.: [id number]     Admit/Registration Date: 5/30/2025 12:39 PM  Date of Consult: 05/31/25      Oral and Maxillofacial Surgery Consult Note    Assessment:  89 y.o. female with nasal bone fracture. No surgical intervention indicated     Plan/Recs:  Appreciate nsgy recs  Sinus precautions   OMFS signing off    -----------------------------------------    Chief Complaint:  Fall    HPI:  89-year-old female presents with fall.  States that she was in the bathroom and has not recall any events and thinks she must of passed out falling forward onto her face.  States tenderness over the region of the glabella.  Denies other injuries.  Associated periorbital bruising and hematoma on the forehead.    Pre-admission records reviewed. PMH/PSH/Meds/Allergies Reviewed.    Past Medical History[1]  Past Surgical History[2]    Allergies[3]    Social History     Socioeconomic History    Marital status:      Spouse name: Not on file    Number of children: Not on file    Years of education: Not on file    Highest education level: Not on file   Occupational History    Not on file   Tobacco Use    Smoking status: Never    Smokeless tobacco: Never   Vaping Use    Vaping status: Never Used   Substance and Sexual Activity    Alcohol use: Not Currently     Comment: Social drinker    Drug use: No    Sexual activity: Not Currently   Other Topics Concern    Not on file   Social History Narrative    Not on file     Social Drivers of Health     Financial Resource Strain: Low Risk  (10/20/2023)    Overall Financial Resource Strain (CARDIA)     Difficulty of Paying Living Expenses: Not hard at all   Food Insecurity: No Food Insecurity (5/8/2023)    Hunger Vital Sign     Worried About Running Out of Food in the Last Year: Never true     Ran Out of Food in the Last Year: Never true   Transportation Needs: No Transportation Needs (10/20/2023)    PRAPARE - Transportation      "Lack of Transportation (Medical): No     Lack of Transportation (Non-Medical): No   Physical Activity: Not on file   Stress: Not on file   Social Connections: Not on file   Intimate Partner Violence: Not on file   Housing Stability: Low Risk  (5/8/2023)    Housing Stability Vital Sign     Unable to Pay for Housing in the Last Year: No     Number of Places Lived in the Last Year: 1     Unstable Housing in the Last Year: No       Scheduled Medications  Current Facility-Administered Medications   Medication Dose Route Frequency Provider Last Rate    acetaminophen  975 mg Oral Q8H JOSH Josesito R Adrien, CRNP      amLODIPine  2.5 mg Oral Daily Josesito R Adrien, CRNP      bacitracin  1 small application Topical BID Josesito R Adrien, CRNP      carbidopa-levodopa  1 tablet Oral TID Josesito R Adrien, CRNP      cholecalciferol  2,000 Units Oral Daily Josesito R Adrien, CRNP      escitalopram  10 mg Oral Daily Josesito R Adrien, CRNP      HYDROmorphone  0.2 mg Intravenous Q2H PRN Josesito R Adrien, CRNP      ondansetron  4 mg Intravenous Q6H PRN Carlin Lim MD      oxyCODONE  2.5 mg Oral Q4H PRN Josesito R Adrien, HILTON      Or    oxyCODONE  5 mg Oral Q4H PRN Josesito R Adrien, CRNP      polyethylene glycol  17 g Oral Daily Carlin Lim MD      senna-docusate sodium  1 tablet Oral HS Josesito R CALI JuradoNP         PRN Medications    HYDROmorphone    ondansetron    oxyCODONE **OR** oxyCODONE    Medication Infusions       Review of Systems   HENT:  Positive for facial swelling. Negative for dental problem, ear pain and sinus pain.    Eyes:  Positive for redness.   Neurological:  Positive for dizziness and light-headedness.       Vitals:    HR:  [72-93] 77  BP: ()/(60-94) 192/91  Resp:  [15-20] 16  SpO2:  [92 %-97 %] 94 %  O2 Device: None (Room air)  Wt Readings from Last 1 Encounters:   05/30/25 55.5 kg (122 lb 5.7 oz)     Ht Readings from Last 1 Encounters:   03/11/25 5' 6\" (1.676 m)     Body mass index is 19.75 kg/m².  Respiratory      Lab Data " (Last 4 hours)      None           O2/Vent Data (Last 4 hours)      None                  Patient Lines/Drains/Airways Status       Active Airway       None                  I/O:  Current Diet Order:        Diet Orders   (From admission, onward)                 Start     Ordered    05/31/25 0759  Diet Regular; Regular House  Diet effective now        References:    Adult Nutrition Support Algorithm    RD Therapeutic Diet Order Protocol   Question Answer Comment   Diet Type Regular    Regular Regular House    Special Instructions Aspiration precautions    Allow Registered Dietitian to adjust diet order per protocol Yes        05/31/25 0759                     Intake/Output Summary (Last 24 hours) at 5/31/2025 1249  Last data filed at 5/31/2025 0819  Gross per 24 hour   Intake 392.5 ml   Output --   Net 392.5 ml       Labs:  Results from last 7 days   Lab Units 05/30/25  1335   WBC Thousand/uL 5.72   HEMOGLOBIN g/dL 13.5   HEMATOCRIT % 40.3   PLATELETS Thousands/uL 210     Results from last 7 days   Lab Units 05/30/25  1335   POTASSIUM mmol/L 4.2   CHLORIDE mmol/L 103   CO2 mmol/L 29   BUN mg/dL 14   CREATININE mg/dL 0.61   CALCIUM mg/dL 9.2             Pain Management Panel  More data may exist         Latest Ref Rng & Units 11/9/2020 5/9/2018   Pain Management Panel   EXT Creatinine Urine Not Estab. mg/dL 36.4  51.0        Imaging:   CT: 1.  Mild bilateral distal nasal bone fractures.  2.  Numerous small scattered locules of pneumocephalus are noted. The etiology of this pneumocephalus is not entirely clear. There is slight irregularity of the posterior wall of the right sphenoid sinus which is a potential source of this gas but a   definitive fracture is not identified with certainty.  3.  Large forehead/midline frontal scalp hematoma.    Physical Exam:   General: Integment: skin warm and dry, patient is WD/WN, Voice quality: wnl, in NAD  Neuro Exam: AAO x 3, CN V,VII grossly intact,   Head: Forehead hematome    Face: No lacerations/Abrasions/Step Offs. Perorbital diffuse bruising across midface   Ears: Pinna wnl bilaterally, no otorrhea, hearing grossly intact,    Eyes/Periorbital: Extraocular movements intact, intercanthal distance wnl, periorbital ecchymoses   Nose: External nose symmetrical/no gross deformity, no nasal crepitus, no nasal septal hematoma, no rhinorrhea, no epistaxis, bilateral nares patent, burising along nasal bridge. No drainage of clear fluid   Oral Exam:Lips and mucosal surfaces wnl, floor of mouth is soft with no palpable masses, tongue protrusion is midline and has full range of motion, no pharyngeal edema or exudate   Dentalalveolar Exam: occlusion stable, OMARI 40, lateral excursive movements wnl,    Lymph/Neck Exam: Neck is soft, trachea is midline, no gross cervical lymphadenopathy bilaterally,    Musculoskeletal: Neck with FROM        Phi Cedeno        [1]   Past Medical History:  Diagnosis Date    Anxiety     Arthritis     Gastroparesis     History of atherosclerosis     History of breast cancer     History of osteoarthritis     Long term use of drug     Malignant neoplasm of breast (HCC) 04/11/2011    left w/mastectomy; age 85    Osteomalacia    [2]   Past Surgical History:  Procedure Laterality Date    BREAST BIOPSY Left 04/11/2011    u/s core-positive    HYSTERECTOMY  1974    age 49    MASTECTOMY Left 05/04/2011    malignant   [3] No Known Allergies

## 2025-05-31 NOTE — ASSESSMENT & PLAN NOTE
- Patient with acute closed mild bilateral distal nasal bone fractures, present on presentation.  - Await OMS evaluation and recommendations.  - May use ice for pain and swelling.  - Multimodal analgesia as indicated.  - Maintain sinus precautions.

## 2025-05-31 NOTE — PHYSICAL THERAPY NOTE
PT EVALUATION    NAME:  Bri Noel  AGE:   89 y.o.  Mrn:   [id number]  Length Of Stay: 1    ADMIT DX:  Facial injury [S09.93XA]    Past Medical History[1]  Past Surgical History[2]     05/31/25 1015   PT Last Visit   PT Visit Date 05/31/25   Note Type   Note type Evaluation   Pain Assessment   Pain Assessment Tool 0-10   Pain Score No Pain   Restrictions/Precautions   LUE Weight Bearing Per Order WBAT   Other Precautions Cognitive;Fall Risk;Bed Alarm;Chair Alarm;Multiple lines;Telemetry  (Pt seen in the ED)   Home Living   Type of Home Other (Comment)  ([facility])   Home Layout One level;Able to live on main level with bedroom/bathroom;Performs ADLs on one level   Bathroom Shower/Tub Walk-in shower   Bathroom Toilet Standard   Bathroom Equipment Grab bars in shower;Shower chair   Bathroom Accessibility Accessible   Home Equipment   (RW; rollator)   Additional Comments Patient is a poor and inconsistent historian; information gained from patient and previous EMR therapy notes   Prior Function   Level of [place] Needs assistance with ADLs;Independent with functional mobility;Needs assistance with IADLS   Lives With (S)  Alone   Receives Help From Family;Home health  (HHA 4 times/week in the a.m. for dressing and showering)   IADLs Family/Friend/Other provides meals;Family/Friend/Other provides medication management;Family/Friend/Other provides transportation   Falls in the last 6 months 5 to 10  (1, reason for admission; patient also states at least 5-6 times recently)   Vocational Retired   Comments Patient ambulates with a rollator prior to admission   General   Additional Pertinent History Patient is admitted with a fall. CT face - Mild bilateral distal nasal bone fractures. PT noted with max brusing to face.   Family/Caregiver Present No   Cognition   Overall Cognitive Status Impaired   Arousal/Participation Poorly responsive   Orientation Level Oriented to person;Oriented to  "situation;Disoriented to place  (Patient is grossly oriented to month only, not day/date or year)   Memory Decreased short term memory;Decreased recall of recent events;Decreased recall of precautions   Following Commands Follows one step commands with increased time or repetition   Comments At least 2 patient identifiers including name and date of birth   Subjective   Subjective \"I am just so tired\"   RLE Assessment   RLE Assessment WFL  (Grossly 3-3/5)   LLE Assessment   LLE Assessment WFL  (Grossly 3-3/5)   Vision-Basic Assessment   Current Vision Wears glasses all the time   Light Touch   RLE Light Touch Grossly intact   LLE Light Touch Grossly intact   Bed Mobility   Supine to Sit 3  Moderate assistance   Additional items Assist x 1;Verbal cues;Increased time required;LE management;HOB elevated  (Trunk management)   Sit to Supine 2  Maximal assistance   Additional items Assist x 1;Verbal cues;Increased time required;LE management  (Trunk management)   Transfers   Sit to Stand 3  Moderate assistance   Additional items Assist x 1;Verbal cues;Increased time required   Stand to Sit 3  Moderate assistance   Additional items Assist x 1;Verbal cues;Increased time required   Additional Comments Patient stood with a roller walker with mod assist of 1, PT attempting to assess patient's BP and standing; patient then became incontinent of loose watery bowel while standing, and despite cues not to, patient reach back with her left hand to check if she was moving her bowels and subsequently wiped her left hand on the sheet on the stretcher - again despite PT cues to keep her hands on the roller walker.  Patient then returned to supine in bed.  PCA made aware and performed hygiene with patient.   Ambulation/Elevation   Gait pattern Not tested   Balance   Static Sitting Fair -   Static Standing Poor  (w RW)   Endurance Deficit   Endurance Deficit Yes   Endurance Deficit Description Limited overall activity, sitting and standin " endurance   Activity Tolerance   Activity Tolerance Patient limited by fatigue;Treatment limited secondary to medical complications (Comment)  (impaired cognition;BM incontinence)   Medical Staff Made Aware CM;Trauma PA   Nurse Made Aware PCA   Assessment   Problem List Decreased strength;Decreased endurance;Impaired balance;Decreased mobility;Decreased coordination;Decreased cognition;Impaired judgement;Decreased safety awareness   Assessment Patient seen for Physical Therapy evaluation. Patient admitted with Pneumocephalus.  Comorbidities affecting patient's physical performance include: Parkinson's, HTN, OA L shld,lumbar radiculopathy, osteoporosis, Vit D def, COVID.  Personal factors affecting patient at time of initial evaluation include: ambulating with assistive device, inability to ambulate household distances, inability to navigate community distances, inability to navigate level surfaces without external assistance, inability to perform dynamic tasks in community, decreased cognition, positive fall history, decreased initiation and engagement, and limited insight into impairments. Prior to admission, patient was independent with functional mobility with rollator, requiring assist for ADLS, requiring assist for IADLS, living in ILF, and ambulating household distance.  Please find objective findings from Physical Therapy assessment regarding body systems outlined above with impairments and limitations including weakness, impaired balance, decreased endurance, impaired coordination, decreased activity tolerance, decreased functional mobility tolerance, decreased safety awareness, impaired judgement, fall risk, and decreased cognition.  The Barthel Index was used as a functional outcome tool presenting with a score of Barthel Index Score: 20 today indicating marked limitations of functional mobility and ADLS.  Patient's clinical presentation is currently unstable/unpredictable as seen in patient's presentation  of vital sign response, varying levels of cognitive performance, increased fall risk, new onset of impairment of functional mobility, decreased endurance, and new onset of weakness. Pt would benefit from continued Physical Therapy treatment to address deficits as defined above and maximize level of functional mobility. As demonstrated by objective findings, the assigned level of complexity for this evaluation is high.    The patient's AM-[facility] Basic Mobility Inpatient Short Form Raw Score is 11. A Raw score of less than or equal to 16 suggests the patient may benefit from discharge to post-acute rehabilitation services. Please also refer to the recommendation of the Physical Therapist for safe discharge planning.   Goals   Patient Goals pt unable to state 2/2 impaired cognition   STG Expiration Date 06/10/25   Short Term Goal #1 Patient will: Increase bilateral LE strength 1/2 grade to facilitate independent mobility, Perform all bed mobility tasks independently to improve pt's independence w/ repositioning for decrease risk of skin breakdown, Perform all transfers independently consistently from various height surfaces in order to improve I w/ engagement w/ real-world environments/situations, Ambulate at least 50 ft. with roller walker independently w/o LOB to facilitate return and engagement w/ previous living environment, Increase all balance 1 grade to decrease risk for falls, Tolerate at least 30 consecutive minutes of activity to demonstrate improved activity tolerance and endurance, and Tolerate 3 hr OOB to faciliate upright tolerance   Plan   Treatment/Interventions ADL retraining;Functional transfer training;LE strengthening/ROM;Therapeutic exercise;Endurance training;Cognitive reorientation;Patient/family training;Equipment eval/education;Bed mobility;Gait training;Compensatory technique education;Spoke to nursing;Spoke to case management   PT Frequency 3-5x/wk   Discharge Recommendation   Rehab Resource  Intensity Level, PT II (Moderate Resource Intensity)   AM-PAC Basic Mobility Inpatient   Turning in Flat Bed Without Bedrails 2   Lying on Back to Sitting on Edge of Flat Bed Without Bedrails 2   Moving Bed to Chair 2   Standing Up From Chair Using Arms 2   Walk in Room 2   Climb 3-5 Stairs With Railing 1   Basic Mobility Inpatient Raw Score 11   Basic Mobility Standardized Score 30.25   [facility] Highest Level Of Mobility   -[facility] Goal 4: Move to chair/commode   -HL Achieved 5: Stand (1 or more minutes)   Barthel Index   Feeding 5   Bathing 0   Grooming Score 0   Dressing Score 5   Bladder Score 0   Bowels Score 0   Toilet Use Score 5   Transfers (Bed/Chair) Score 5   Mobility (Level Surface) Score 0   Stairs Score 0   Barthel Index Score 20   End of Consult   Patient Position at End of Consult Supine;All needs within reach   Licensure   [place] License Number  Carmen L Nicole, PT   Portions of the documentation may have been created using voice recognition software. Occasional wrong word or sound alike substitutions may have occurred due to the inherent limitation of the voice recognition software. Read the chart carefully and recognize, using context, where substitutions have occurred.          [1]   Past Medical History:  Diagnosis Date    Anxiety     Arthritis     Gastroparesis     History of atherosclerosis     History of breast cancer     History of osteoarthritis     Long term use of drug     Malignant neoplasm of breast (HCC) 04/11/2011    left w/mastectomy; age 85    Osteomalacia    [2]   Past Surgical History:  Procedure Laterality Date    BREAST BIOPSY Left 04/11/2011    u/s core-positive    HYSTERECTOMY  1974    age 49    MASTECTOMY Left 05/04/2011    malignant

## 2025-05-31 NOTE — CASE MANAGEMENT
Case Management Progress Note    Patient name Bri Noel  Location ED-04/ED-04 MRN [id number]  : 1925 Date 2025       LOS (days): 1  Geometric Mean LOS (GMLOS) (days): 2.2  Days to GMLOS:1.4        OBJECTIVE:        Current admission status: Inpatient  Preferred Pharmacy:   [id number] - [place], [place] - [address] [place] [address]  Phone: [phone] Fax: [phone]    [facility] - [place], [place] - [address] [place] [address]  Phone: [phone] Fax: [phone]    [facility] - [place], [place] - [address] [address]  Phone: [phone] Fax: [phone]    Primary Care Provider: Sanchez Salazar DO    Primary Insurance: MEDICARE  Secondary Insurance: BLUE CROSS    PROGRESS NOTE:    Cm made PC to patients daughter to discuss therapy recs. CM left  requesting call back. CM submitted STR referral to [facility] since patient from [facility] there in the meantime.

## 2025-05-31 NOTE — ASSESSMENT & PLAN NOTE
- Patient with chronic history of primary hypertension and elevated blood pressures during hospital encounter.  - Continue/resume home medication therapy as appropriate.  - Outpatient follow-up per routine.

## 2025-05-31 NOTE — CASE MANAGEMENT
Case Management Progress Note    Patient name Bri Noel  Location W /W -01 MRN [id number]  : 1925 Date 2025       LOS (days): 1  Geometric Mean LOS (GMLOS) (days): 2.2  Days to GMLOS:1.2        OBJECTIVE:        Current admission status: Inpatient  Preferred Pharmacy:   [id number] - [place] - [address] [place] [address]  Phone: [phone] Fax: [phone]    [facility] - [place], [place] - [address] [facility]  [address] [place] [address] [place] [address]  Phone: [phone] Fax: [phone]    [facility] - [place], [place] - [address] [facility]  [address] [facility] [place] [address]  Phone: [phone] Fax: [phone]    Primary Care Provider: Sanchez Salazar DO    Primary Insurance: MEDICARE  Secondary Insurance: BLUE CROSS    PROGRESS NOTE:    Cm attempted PC to [facility] to discuss patients return to previous personal care unit. Cm spoke with RN to discuss patients return. Facility stating patient would need to go to [facility] before return to [facility].

## 2025-05-31 NOTE — ASSESSMENT & PLAN NOTE
Status post mechanical fall at her facility with positive head strike  CT cervical spine without acute fracture or traumatic malalignment  See plan above

## 2025-05-31 NOTE — CONSULTS
Consultation - Neurosurgery   Name: Bri Noel 89 y.o. female I MRN: [id number]  Unit/Bed#: ED-04 I Date of Admission: 5/30/2025   Date of Service: 5/31/2025 I Hospital Day: 1   Inpatient consult to Neurosurgery  Consult performed by: Candelaria Lao PA-C  Consult ordered by: Carlin Lim MD        Physician Requesting Evaluation: Maura Butler,    Reason for Evaluation / Principal Problem: Pneumocephalus    Assessment & Plan  Pneumocephalus  Intracranial pneumocephalus  Status post mechanical fall with head strike 5/30; thankfully no intracranial hemorrhage noted  Imaging without overt signs of calvarial fracture although there is concern for sphenoid sinus bony injury as the cause of pneumocephalus  Does have mild bilateral distal nasal bone fractures as well for which OMFS is consulted  No reported use of anticoagulation or antiplatelet therapies    Imaging:  CTA head and neck with and without contrast 5/30/2025: Interval decrease in pneumocephalus, few residual bubbles remaining around the cribriform plate.  Fluid levels in the sphenoid sinuses could be a sentinel finding for bony injury, although there are still no discrete fracture planes evident.  No new acute brain parenchymal or extra-axial findings.  CT head without contrast 5/30/2025: No intracranial hemorrhage.  No definitive calvarial fracture identified although the presence of several small scattered locules of pneumocephalus are concerning for an occult nondisplaced calvarial fracture.    Plan:  Imaging reviewed, scattered pneumocephalus which has improved on subsequent imaging likely from occult sphenoid sinus injury.  No surgical intervention is warranted for this.  Repeat CT head STAT if GCS declines more than 2 points in 1 hour  BP parameters per primary team however would recommend SBP < 160  PLT > 100, INR < 1.4  No need for AED management  Continue to monitor neuro exam  Medical management and pain control per primary team  DVT  ppx:  SCDs, okay for pharmacological DVT prophylaxis from neurosurgery standpoint  Mobilize as tolerated with assistance, PT / OT evaluation    Neurosurgery will sign off.  Outpatient follow-up in 2 weeks time with repeat CT head to ensure ongoing resolution of pneumocephalus.  Please call with questions or concerns.    Parkinson's disease (HCC)  Continue home regimen  Primary hypertension  Recommend systolic blood pressure less than 160  Fall  Status post mechanical fall at her facility with positive head strike  CT cervical spine without acute fracture or traumatic malalignment  See plan above  Closed fracture of nasal bone  OMFS consult but imagine nonoperative management      History of Present Illness   HPI: Bri Noel is a 89 y.o. year old female with past medical history significant for history of breast cancer, osteoarthritis, anxiety who presents status post fall at her facility on 5/30/2025 with concern for pneumocephalus with possible sphenoid sinus injury as well as nasal bone fracture.  She states that she was getting up to walk to lunch when she must have fallen but she is unsure how she did this.  She endorses positive head strike.  Currently does not complain of much other than some shoulder pain which is more evident during examination.  She does have extensive periorbital ecchymosis but denies any facial pain.  Denies blood thinner use    Review of Systems   Constitutional:  Negative for activity change, chills and fever.   HENT:  Negative for hearing loss.    Eyes:  Negative for visual disturbance.   Respiratory:  Negative for chest tightness and shortness of breath.    Cardiovascular:  Negative for chest pain.   Gastrointestinal:  Negative for abdominal pain, constipation, diarrhea, nausea and vomiting.   Genitourinary:  Negative for difficulty urinating.   Musculoskeletal:  Negative for back pain and neck pain.        Shoulder pain   Skin:  Positive for wound (extensive facial bruising).    Neurological:  Negative for dizziness, facial asymmetry, speech difficulty, weakness, numbness and headaches.   Psychiatric/Behavioral:  Negative for confusion.      Medical History Review: I have reviewed the patient's PMH, PSH, Social History, Family History, Meds, and Allergies   Historical Information   Past Medical History[1]  Past Surgical History[2]  Social History[3]  E-Cigarette/Vaping    E-Cigarette Use Never User      E-Cigarette/Vaping Substances    Nicotine No     THC No     CBD No     Flavoring No     Other No     Unknown No      Family History[4]  Social History[5]    Current Facility-Administered Medications:     acetaminophen (TYLENOL) tablet 975 mg, Q8H JOSH    amLODIPine (NORVASC) tablet 2.5 mg, Daily    bacitracin topical ointment 1 small application, BID    carbidopa-levodopa (SINEMET)  mg per tablet 1 tablet, TID    Cholecalciferol (VITAMIN D3) tablet 2,000 Units, Daily    escitalopram (LEXAPRO) tablet 10 mg, Daily    HYDROmorphone HCl (DILAUDID) injection 0.2 mg, Q2H PRN    ondansetron (ZOFRAN) injection 4 mg, Q6H PRN    oxyCODONE (ROXICODONE) split tablet 2.5 mg, Q4H PRN **OR** oxyCODONE (ROXICODONE) IR tablet 5 mg, Q4H PRN    polyethylene glycol (MIRALAX) packet 17 g, Daily    senna-docusate sodium (SENOKOT S) 8.6-50 mg per tablet 1 tablet, HS    sodium chloride 0.9 % infusion, Continuous, Last Rate: 50 mL/hr (05/31/25 0228)  Prior to Admission Medications   Prescriptions Last Dose Informant Patient Reported? Taking?   Diclofenac Sodium (VOLTAREN) 1 %   No No   Sig: Apply 2 g topically every 12 (twelve) hours as needed (left shoulder pain/OA)   Patient not taking: Reported on 3/11/2025   acetaminophen (TYLENOL) 325 mg tablet   No No   Sig: Take 3 tablets (975 mg total) by mouth every 8 (eight) hours   amLODIPine (Norvasc) 2.5 mg tablet   No No   Sig: Take 1 tablet (2.5 mg total) by mouth daily   calcium citrate-vitamin D (CITRACAL+D) 315-200 MG-UNIT per tablet  Self Yes No   Sig: Take  1 tablet by mouth daily   carbidopa-levodopa (SINEMET)  mg per tablet   No No   Sig: Take 1 tablet by mouth 3 (three) times a day   chlorhexidine (PERIDEX) 0.12 % solution   Yes No   Sig: Use as directed    Patient not taking: Reported on 3/11/2025   cholecalciferol (VITAMIN D3) 1,000 units tablet  Self Yes No   Sig: Take 3,000 Units by mouth daily    escitalopram (LEXAPRO) 10 mg tablet   No No   Sig: TAKE 1/2 TABLET BY MOUTH TWICE A DAY   traMADol (ULTRAM) 50 mg tablet   No No   Sig: TAKE 1 TABLET BY MOUTH EVERY MORNING TAKE 1 TABLET BY MOUTH EVERY EVENING OKAY TO TAKE 1 TABLET MID-DAY FOR PAIN IF NEEDED   Patient taking differently: Take 50 mg by mouth 3 (three) times a day TAKE 1 TABLET BY MOUTH  3 times a day   vitamin B-12 (VITAMIN B-12) 1,000 mcg tablet   Yes No   Sig: Take by mouth daily   Patient not taking: Reported on 3/11/2025      Facility-Administered Medications: None     Patient has no known allergies.    Objective :  Temp:  [97.7 °F (36.5 °C)] 97.7 °F (36.5 °C)  HR:  [72-95] 73  BP: ()/(60-94) 194/89  Resp:  [15-20] 16  SpO2:  [92 %-97 %] 97 %  O2 Device: None (Room air)    Physical Exam  Constitutional:       General: She is awake.      Appearance: She is well-developed.   HENT:      Head:      Comments: Extensive periorbital/facial bruising noted     Right Ear: Hearing normal.      Left Ear: Hearing normal.     Eyes:      Conjunctiva/sclera: Conjunctivae normal.      Comments: Periorbital bruising bilaterally     Cardiovascular:      Rate and Rhythm: Normal rate.   Pulmonary:      Effort: Pulmonary effort is normal. No respiratory distress.     Skin:     General: Skin is warm.     Neurological:      Mental Status: She is alert and oriented to person, place, and time.     Psychiatric:         Attention and Perception: Attention and perception normal.         Mood and Affect: Mood and affect normal.         Speech: Speech normal.         Behavior: Behavior normal. Behavior is cooperative.          Thought Content: Thought content normal.         Cognition and Memory: Cognition and memory normal.         Judgment: Judgment normal.      Neurological Exam  Mental Status  Awake and alert. Oriented to person, place, and time. Memory is normal. Recent and remote memory are intact. Speech is normal. Follows one-step commands. Attention and concentration are normal. Fund of knowledge is appropriate for level of education.    Cranial Nerves  CN II: Vision test: Periorbital bruising bilaterally.  CN VIII:  Right: Hearing is normal.  Left: Hearing is normal.    Motor  Normal muscle bulk throughout. No fasciculations present. Normal muscle tone. No abnormal involuntary movements. Strength is 5/5 in all four extremities except as noted.  4/5 throughout, some pain limiting weakness in the shoulder secondary to pain.    Coordination  Right: Finger-to-nose normal.Left: Finger-to-nose normal.        Lab Results: I have reviewed the following results:  Recent Labs     05/30/25  1335   WBC 5.72   HGB 13.5   HCT 40.3      SODIUM 139   K 4.2      CO2 29   BUN 14   CREATININE 0.61   GLUC 95   AST 12*   ALT <3*   ALB 4.0   TBILI 0.56   ALKPHOS 56   HSTNI0 3       Imaging Results Review: I personally reviewed the following image studies in PACS and associated radiology reports: CT head, CTA head and neck with and without contrast, CT cervical spine, CT facial bones. My interpretation of the radiology images/reports is: As noted above.  Other Study Results Review: No additional pertinent studies reviewed.    VTE Pharmacologic Prophylaxis: VTE covered by:    None    and Sequential compression device (Venodyne)            [1]   Past Medical History:  Diagnosis Date    Anxiety     Arthritis     Gastroparesis     History of atherosclerosis     History of breast cancer     History of osteoarthritis     Long term use of drug     Malignant neoplasm of breast (HCC) 04/11/2011    left w/mastectomy; age 85    Osteomalacia     [2]   Past Surgical History:  Procedure Laterality Date    BREAST BIOPSY Left 04/11/2011    u/s core-positive    HYSTERECTOMY  1974    age 49    MASTECTOMY Left 05/04/2011    malignant   [3]   Social History  Tobacco Use    Smoking status: Never    Smokeless tobacco: Never   Vaping Use    Vaping status: Never Used   Substance and Sexual Activity    Alcohol use: Not Currently     Comment: Social drinker    Drug use: No    Sexual activity: Not Currently   [4]   Family History  Problem Relation Name Age of Onset    Coronary artery disease Mother      Coronary artery disease Family      Hyperlipidemia Family      Osteoarthritis Family      Lung cancer Sister  50   [5]   Social History  Tobacco Use    Smoking status: Never    Smokeless tobacco: Never   Vaping Use    Vaping status: Never Used   Substance and Sexual Activity    Alcohol use: Not Currently     Comment: Social drinker    Drug use: No    Sexual activity: Not Currently

## 2025-05-31 NOTE — ASSESSMENT & PLAN NOTE
- Patient noted to have scattered pneumocephalus on initial CT imaging without definitive evidence of calvarial fracture.  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  - Awaiting OMFS and neurosurgery consultations and recommendations.  - May use ice for pain and swelling of face.  - Analgesia as needed.  - No further workup anticipated at this time.  - PT and OT evaluation shims indicated.  Case management consulted for disposition planning.

## 2025-05-31 NOTE — CASE MANAGEMENT
Case Management Assessment & Discharge Planning Note    Patient name Bri Noel  Location W /W -01 MRN [id number]  : 1925 Date 2025       Current Admission Date: 2025  Current Admission Diagnosis:Pneumocephalus   Patient Active Problem List    Diagnosis Date Noted    Fall 2025    Acute pain of left shoulder 2025    Closed fracture of nasal bone 2025    Pneumocephalus 2025    History of 2019 novel coronavirus disease (COVID-19) 2023    Primary osteoarthritis of left shoulder 2023    Left leg swelling 2022    Other chest pain 2021    REM behavioral disorder 2021    Depression with anxiety 2018    Lumbar radiculopathy 2018    Tremor 2018    Primary hypertension 2018    Parkinson's disease (HCC) 2017    Hoarseness 2017    Esophageal reflux 2016    Vitamin D deficiency 2015    Breast cancer (HCC) 2015    Anemia 10/21/2014    Nontoxic single thyroid nodule 2013    Arthritis 12/10/2012    Atherosclerosis 12/10/2012    Gastroparesis 12/10/2012    Hyperlipidemia 2012    Osteoporosis 2012      LOS (days): 1  Geometric Mean LOS (GMLOS) (days): 2.2  Days to GMLOS:1.2     OBJECTIVE:    Risk of Unplanned Readmission Score: 13.34         Current admission status: Inpatient       Preferred Pharmacy:   [id number] - [place] - [address] [place] [address]  Phone: [phone] Fax: [phone]    [facility] - [place] - [address]  [address] [place] [address]  Phone: [phone] Fax: [phone]    [facility] [place] - [address]  [address] [place] [address]  Phone: [phone] Fax: [phone]    Primary Care Provider: Sanchez Salazar DO    Primary Insurance: MEDICARE  Secondary Insurance:  BLUE CROSS    ASSESSMENT:  Active Health Care Proxies    There are no active Health Care Proxies on file.                      Patient Information  Admitted from:: Facility ([facility]  Mental Status: Confused  During Assessment patient was accompanied by: Not accompanied during assessment  Assessment information provided by:: Daughter  Primary Caregiver: Self  Support Systems: Home care staff, Family members  County of Residence: [place]  What city do you live in?: [place]  Home entry access options. Select all that apply.: No steps to enter home  Type of Current Residence: Facility  Upon entering residence, is there a bedroom on the main floor (no further steps)?: Yes  Upon entering residence, is there a bathroom on the main floor (no further steps)?: Yes  Living Arrangements: Lives in Facility    Activities of Daily Living Prior to Admission  Functional Status: Assistance  Completes ADLs independently?: No  Level of ADL dependence: Assistance  Ambulates independently?: No  Level of ambulatory dependence: Assistance  Does patient use assisted devices?: Yes  Assisted Devices (DME) used: Walker, Rollator  Does patient currently own DME?: Yes  What DME does the patient currently own?: Rollator, Walker  Does patient have a history of Outpatient Therapy (PT/OT)?: No  Does the patient have a history of Short-Term Rehab?: Yes  Does patient have a history of HHC?: Yes  Does patient currently have HHC?: No         Patient Information Continued  Income Source: Pension/half-way  Does patient have prescription coverage?: Yes  Can the patient afford their medications and any related supplies (such as glucometers or test strips)?: N/A  Does patient receive dialysis treatments?: No  Does patient have a history of substance abuse?: No  Does patient have a history of Mental Health Diagnosis?: No         Means of [facility]          DISCHARGE DETAILS:    Discharge  planning discussed with:: Daughter, grand daughters  Freedom of Choice: Yes  Comments - Freedom of Choice: Cm met with patients daughter and grand daughters in family waiting room to introduce self and role. Patient from [facility], family would like patient to return to previous unit if possible. CM submitted STR referral just incase and checking to see if able to return to previous unit. Cm provided [employer] flyer and waiver information. Family interested in working on getting patient services so patient can return home and have home aids assist daughter with caring for patient. CM to follow up with daughter after getting word from [facility] in regards to taking patient back to previous unit.  CM contacted family/caregiver?: Yes  Were Treatment Team discharge recommendations reviewed with patient/caregiver?: Yes  Did patient/caregiver verbalize understanding of patient care needs?: N/A- going to facility  Were patient/caregiver advised of the risks associated with not following Treatment Team discharge recommendations?: Yes    Contacts  Patient Contacts: kt Rodriguez  Relationship to Patient:: Family  Contact Method: In Person  Reason/Outcome: Discharge Planning              Other Referral/Resources/Interventions Provided:  Interventions: Facility Return, Short Term Rehab  Referral Comments: [facility] assisted vs [facility] STR- referral in [place]

## 2025-05-31 NOTE — ED ATTENDING ATTESTATION
5/30/2025  I, Celia Wakefield MD, saw and evaluated the patient. I have discussed the patient with the resident/non-physician practitioner and agree with the resident's/non-physician practitioner's findings, Plan of Care, and MDM as documented in the resident's/non-physician practitioner's note, except where noted. All available labs and Radiology studies were reviewed.  I was present for key portions of any procedure(s) performed by the resident/non-physician practitioner and I was immediately available to provide assistance.       At this point I agree with the current assessment done in the Emergency Department.  I have conducted an independent evaluation of this patient a history and physical is as follows:    89-year-old female presenting to the ER after a fall at nursing home.  Hit her head.  Bruising noted over the nose and below eyes bilaterally.  Patient unable to provide adequate history.    Agree with trauma scans ordered, medical workup as well including EKG electrolytes CBC Trop      ED Course         Critical Care Time  Procedures

## 2025-05-31 NOTE — PROGRESS NOTES
Progress Note - Trauma   Name: Bri Noel 89 y.o. female I MRN: [id number]  Unit/Bed#: ED-04 I Date of Admission: 5/30/2025   Date of Service: 5/31/2025 I Hospital Day: 1    Assessment & Plan  Fall  - Status post fall with the below noted injuries.  - Fall precautions.  - Geriatric Medicine consultation for evaluation, medication review and recommendations.  - PT and OT evaluation and treatment as indicated.  - Case Management consultation for disposition planning.  Pneumocephalus  - Patient noted to have scattered pneumocephalus on initial CT imaging without definitive evidence of calvarial fracture.  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  - Awaiting OMFS and neurosurgery consultations and recommendations.  - May use ice for pain and swelling of face.  - Analgesia as needed.  - No further workup anticipated at this time.  - PT and OT evaluation shims indicated.  Case management consulted for disposition planning.  Closed fracture of nasal bone  - Patient with acute closed mild bilateral distal nasal bone fractures, present on presentation.  - Await OMS evaluation and recommendations.  - May use ice for pain and swelling.  - Multimodal analgesia as indicated.  - Maintain sinus precautions.  Acute pain of left shoulder  Primary osteoarthritis of left shoulder  - Patient with left shoulder and upper arm pain on presentation in setting of chronic history of left shoulder osteoarthritis.  - X-ray imaging from 5/30/2025 reviewed with no evidence of acute traumatic injury or osseous abnormality; degenerative changes were noted.  - May remain weightbearing as tolerated on the left upper extremity with activity as tolerated.  - Analgesia as needed.  - May use ice for pain and swelling.  -  "PT and OT evaluation shims indicated.  - Outpatient follow-up with Orthopedic surgery recommended.  Parkinson's disease (HCC)  - Patient with chronic history of Parkinson's disease.  - Maintain fall precautions.  - Continue/resume home medication therapy as appropriate.  - PT and OT evaluation and treatment as indicated.  - Outpatient follow-up per routine.  Primary hypertension  - Patient with chronic history of primary hypertension and elevated blood pressures during hospital encounter.  - Continue/resume home medication therapy as appropriate.  - Outpatient follow-up per routine.    VTE Prophylaxis: VTE covered by:    None         Disposition: Continue current level of care while awaiting consultant input.  Await therapy evaluation and recommendations.  Case management consulted for disposition planning.  Please contact the SecureChat role for the Trauma service with any questions/concerns.    TRAUMA TERTIARY SURVEY  Summary of Diagnosed Injuries: See above.    Transfer from: N/A    Mechanism of Injury:Fall     Chief Complaint: \"I'm okay.\"    24 Hour Events : No significant events overnight.  Subjective : Patient notes continued pain in her left arm, but reports it is no different than her chronic pain.  She has minimal facial pain/headache today.  She denies any visual changes, lightheadedness or dizziness headache she has not fever or new/worsening pain.  She is breathing comfortably and denies any chest pain or shortness of breath.  She has no other new complaints at this time.    Objective :  Temp:  [97.7 °F (36.5 °C)] 97.7 °F (36.5 °C)  HR:  [72-95] 75  BP: ()/(60-94) 189/84  Resp:  [15-20] 15  SpO2:  [92 %-95 %] 95 %  O2 Device: None (Room air)    I/O         05/29 0701 05/30 0700 05/30 0701 05/31 0700    IV Piggyback  100    Total Intake(mL/kg)  100 (1.8)    Net  +100                  Physical Exam   GENERAL APPEARANCE: Patient in no acute distress.  HEENT: NC, diffuse periorbital and upper facial " ecchymosis with mild swelling and minimal tenderness; PERRL, EOMs intact; Mucous membranes moist  NECK / BACK: No midline cervical, thoracic or lumbar spine tenderness, step-offs or deformities.  No paraspinal muscular tenderness in the neck or back.  CV: Regular rate and rhythm; no murmur/gallops/rubs appreciated.  CHEST / LUNGS: Clear to auscultation; no wheezes/rales/rhonci.  Breathing comfortably on room air with no chest wall tenderness, crepitus or deformity.  ABD: NABS; soft; non-distended; non-tender.  : Voiding spontaneously.  EXT: +2 pulses bilaterally upper & lower extremities; no edema. Normal range of motion in the right upper and bilateral lower extremities without pain, tenderness or deformity.  Pain with range of motion and tenderness throughout the left upper extremity, primarily about the left shoulder and upper arm without deformity.  Left upper extremity neurovascular exam intact.  NEURO: GCS 15; no focal neurologic deficits; neurovascularly intact.  SKIN: Warm, dry and well perfused; no rash; no jaundice.  Facial ecchymosis is noted.      ISAR Score: Did you order a geriatric consult if the score was 2 or greater?: yes (ISAR) Identification of Seniors at Risk  Before the illness or injury that brought you to the Emergency, did you need someone to help you on a regular basis?: 0  In the last 24 hours, have you needed more help than usual?: 1  Have you been hospitalized for one or more nights during the past 6 months?: 0  In general, do you see well?: 0  In general, do you have serious problems with your memory?: 0  Do you take more than three different medications every day?: 1  ISAR Score: 2               Lab Results: I have reviewed the following results:  Recent Labs     05/30/25  1335   WBC 5.72   HGB 13.5   HCT 40.3      SODIUM 139   K 4.2      CO2 29   BUN 14   CREATININE 0.61   GLUC 95   AST 12*   ALT <3*   ALB 4.0   TBILI 0.56   ALKPHOS 56   HSTNI0 3       Imaging Results  Review: I reviewed radiology reports from this admission including: chest xray, CT head, CT C-spine, CT facial bones, CT scan of the temporal bones, CTA of the head and neck, and xray(s).  Other Study Results Review: No additional pertinent studies reviewed.

## 2025-05-31 NOTE — ASSESSMENT & PLAN NOTE
- Patient with chronic history of Parkinson's disease.  - Maintain fall precautions.  - Continue/resume home medication therapy as appropriate.  - PT and OT evaluation and treatment as indicated.  - Outpatient follow-up per routine.

## 2025-06-01 PROCEDURE — 99232 SBSQ HOSP IP/OBS MODERATE 35: CPT | Performed by: SURGERY

## 2025-06-01 PROCEDURE — 97167 OT EVAL HIGH COMPLEX 60 MIN: CPT

## 2025-06-01 RX ORDER — ENOXAPARIN SODIUM 100 MG/ML
40 INJECTION SUBCUTANEOUS
Status: DISCONTINUED | OUTPATIENT
Start: 2025-06-02 | End: 2025-06-03 | Stop reason: HOSPADM

## 2025-06-01 RX ADMIN — ACETAMINOPHEN 975 MG: 325 TABLET ORAL at 21:49

## 2025-06-01 RX ADMIN — Medication 2000 UNITS: at 09:38

## 2025-06-01 RX ADMIN — BACITRACIN 1 SMALL APPLICATION: 500 OINTMENT TOPICAL at 09:38

## 2025-06-01 RX ADMIN — ACETAMINOPHEN 975 MG: 325 TABLET ORAL at 15:12

## 2025-06-01 RX ADMIN — CARBIDOPA AND LEVODOPA 1 TABLET: 25; 100 TABLET ORAL at 21:49

## 2025-06-01 RX ADMIN — ESCITALOPRAM OXALATE 10 MG: 10 TABLET ORAL at 09:38

## 2025-06-01 RX ADMIN — CARBIDOPA AND LEVODOPA 1 TABLET: 25; 100 TABLET ORAL at 09:38

## 2025-06-01 RX ADMIN — SENNOSIDES AND DOCUSATE SODIUM 1 TABLET: 50; 8.6 TABLET ORAL at 21:49

## 2025-06-01 RX ADMIN — CARBIDOPA AND LEVODOPA 1 TABLET: 25; 100 TABLET ORAL at 15:12

## 2025-06-01 RX ADMIN — AMLODIPINE BESYLATE 2.5 MG: 2.5 TABLET ORAL at 09:38

## 2025-06-01 RX ADMIN — BACITRACIN 1 SMALL APPLICATION: 500 OINTMENT TOPICAL at 21:50

## 2025-06-01 RX ADMIN — ACETAMINOPHEN 975 MG: 325 TABLET ORAL at 05:39

## 2025-06-01 NOTE — OCCUPATIONAL THERAPY NOTE
"    Occupational Therapy Evaluation     Patient Name: Bri Noel  Today's Date: 6/1/2025  Problem List  Principal Problem:    Pneumocephalus  Active Problems:    Parkinson's disease (HCC)    Primary hypertension    Primary osteoarthritis of left shoulder    Fall    Acute pain of left shoulder    Closed fracture of nasal bone    Past Medical History  Past Medical History[1]  Past Surgical History  Past Surgical History[2]        06/01/25 1006   OT Last Visit   OT Visit Date 06/01/25  (Sunday)   Note Type   Note type Evaluation  (Eval [id number])   Additional Comments Identified pt by full name and birthdate   Pain Assessment   Pain Assessment Tool 0-10   Pain Score No Pain  (\"I am just so tired\" stated upon therapist arrival)   Effect of Pain on Daily Activities limits activity tolerance during ADLs   Hospital Pain Intervention(s) Repositioned;Ambulation/increased activity;Elevated;Emotional support   Restrictions/Precautions   LUE Weight Bearing Per Order   (L UE limb alert, WBAT per PT eval)   Other Precautions Chair Alarm;Bed Alarm;Cognitive;Limb alert;Fall Risk;Pain;Hard of hearing  ([place], room air)   Home Living   Type of Home Assisted living;Other (Comment)  ([facility]   Home Layout One level;Performs ADLs on one level;Able to live on main level with bedroom/bathroom   Bathroom Shower/Tub Walk-in shower   Bathroom Toilet Standard   Bathroom Equipment Grab bars in shower;Shower chair   Bathroom Accessibility Accessible   Home Equipment Walker;Grab bars  (RW, rollator)   Additional Comments Pt reports residing at [facility] for 8 years   Prior Function   Level of [place] Needs assistance with IADLS   Lives With Alone;Facility staff   Receives Help From Family;Home health  ([facility] 4X week in the AM)   IADLs Family/Friend/Other provides transportation;Family/Friend/Other provides meals;Family/Friend/Other provides medication management   Falls in the last 6 months 5 to 10  (pt reports " "5-6)   Vocational Retired   Comments Pt reports use of walker for functional mobility w/ out assistance at baseline to / from dining room. Pt needs assistance w/ bathing and reports staff assist as needed w/ dressing. Pt reports I w/ toileting at baseline using walker   Lifestyle   Autonomy Pt reports supportive staff at [facility] assist as needed w/ ADLs. I w/ functional mobility using walker   Reciprocal Relationships Pt reports supportive staff at [facility]   Service to Others Pt reports retired and worked at the [employer]   Intrinsic Gratification Pt reports enjoying activities at [facility], reading   General   Additional Pertinent History Pt admitted to [facility] on 5/30/25 as a trauma following a fall from [facility]. Diagnosed w/ pneumocephalus and found to have nasal bone fractures. OMFS and Nsx consulted; recommend sinus precautions and no surigcal intervention at this time   Family/Caregiver Present No   Additional General Comments Personal and environmental factors supporting performance includes access to AD; supportive staff/ family; barriers include advanced age, inability to complete IADLs, difficulty completing ADLs, fall history.   Subjective   Subjective \"I have been there eight years\"   ADL   Where Assessed Chair  (vs in stance using RW)   Eating Assistance 6  Modified independent   Eating Deficit Setup;Increased time to complete   Grooming Assistance 5  Supervision/Setup   Grooming Deficit Setup;Verbal cueing;Supervision/safety;Increased time to complete  (seated w/ + time after set- up to complete oral hygiene)   UB Bathing Assistance 4  Minimal Assistance   UB Bathing Deficit Setup  (seated after set- up w/ assistance to wash her back; + time UE, chest, underwarms. Pt reports \"dizziness when sitting upright\")   LB Bathing Assistance 2  Maximal Assistance   LB Bathing Deficit Setup;Right lower leg including foot;Left lower leg including foot;Perineal area;Buttocks;Verbal cueing;Increased time to " complete;Supervision/safety   UB Dressing Assistance 4  Minimal Assistance   UB Dressing Deficit Setup;Thread RUE;Thread LUE;Pull around back   LB Dressing Assistance 2  Maximal Assistance   LB Dressing Deficit Thread RLE into pants;Thread LLE into pants;Thread RLE into underwear;Thread LLE into underwear;Pull up over hips;Setup;Requires assistive device for steadying;Steadying;Verbal cueing;Supervision/safety;Increased time to complete  (while seated to thread LE into underwear and pants)   Toileting Assistance  Unable to assess  (denied need to void)   Additional Comments on room air O2 sats >90%   Bed Mobility   Supine to Sit Unable to assess   Sit to Supine Unable to assess   Additional Comments Pt seated OOB In chair upon therapist arrival and post eval w/ needs met, call bell in reach and chair alarm activated   Transfers   Sit to Stand 3  Moderate assistance   Additional items Assist x 1;Bedrails;Armrests;Increased time required;Verbal cues   Stand to Sit 4  Minimal assistance   Additional items Assist x 1;Bedrails;Armrests;Increased time required;Verbal cues   Additional Comments Pt tolerated standing < 1 minute w/ poor + balance.   Functional Mobility   Additional Comments Pt declined reporting fatigue, dizziness. RN reports pt OOB to chair earlier using RW w/ Ax1 but was unsteady   Additional items Rolling walker   Balance   Static Sitting Fair -   Static Standing Poor +   Activity Tolerance   Activity Tolerance Patient limited by fatigue;Patient limited by pain   Medical Staff Made Aware spoke w/ Giovanni BLANTON   Nurse Made Aware spoke w/ Kathryn WALTON and PCA   RUE Assessment   RUE Assessment   (observed during ADLs, generalized weakness / deconditioning)   RUE Strength   RUE Overall Strength Within Functional Limits - able to perform ADL tasks with strength   LUE Assessment   LUE Assessment   (observed during ADLs, generalized weakness / deconditioning)   LUE Strength   LUE Overall Strength Deficits  (premorbid  "shoulder deficits due to osteoarthritis)   Hand Function   Gross Motor Coordination Functional   Fine Motor Coordination Functional   Vision-Basic Assessment   Current Vision Wears glasses all the time  (not wearing during eval due to bruising, nasal bone fractures)   Cognition   Overall Cognitive Status Impaired   Arousal/Participation Responsive;Arousable;Cooperative   Attention Attends with cues to redirect   Orientation Level Oriented to person;Oriented to place;Oriented to situation  (able to report month, year)   Memory Decreased recall of recent events   Following Commands Follows one step commands with increased time or repetition   Comments Responsive and cooperative. Pt reporting fatigue and \"just feels so tired\". Able to follow directions w/ + time due to hard of hearing.   Assessment   Limitation Decreased ADL status;Decreased UE strength;Decreased cognition;Decreased endurance;Decreased self-care trans;Decreased high-level ADLs  (impaired pain, activity tolerance, standing tolerance, balance, forward functional reach, sitting tolerance)   Assessment Pt is a 89yo female admitted to [facility] on 5/30/25 as a trauma following a fall. Diagnosed w/ pneumocephalus and found to have nasal bone fractures. OMFS and Neurosurgery consulted; Recommend sinus precautions. Significant PMH impacting her occupational performance includes Parkinson's disease, osteoarthritis L shoulder, breast cancer hx s/p L mastectomy (2011), anxiety, arthritis. Pt resides at [facility] and needs assistance w/ IADLs. Pt reports supportive staff assist w/ bathing and as needed w/ dressing. Pt reports I w/ toileting using RW at baseline. Upon eval, pt responsive and cooperative. Pt demonstrated decreased activity tolerance reporting fatigue. Pt required min A UBD, max A LBD, min A UB bathing while seated, mod A sit <> stand to RW. Pt declined additional sit to stand or functional mobility reporting fatigue. Pt generally oriented to " "person, place, month/ year and situation. Pt is completing ADLs below baseline level of I w/ decreased activity tolerance, standing tolerance, balance, forward functional reach, and impaired pain. Pt would benefit from OT In acute care to max I w/ ADLs, achieve highest level of function. Recommend level II rehab resource intensity when medically stable for discharge from acute care at this time pend improved activity tolerance/ support, assist at PLOF. Will continue to follow   Goals   Patient Goals Pt stated that she wants to feel better and \"just wants her bed\"   Plan   Treatment Interventions ADL retraining;Functional transfer training;Endurance training;Patient/family training;Equipment evaluation/education;Compensatory technique education;Energy conservation;Activityengagement;Continued evaluation   Goal Expiration Date 06/08/25   OT Treatment Day 0  (Sunday 6/1/25)   OT Frequency 3-5x/wk   Discharge Recommendation   Recommendation Geriatric Consult   Rehab Resource Intensity Level, OT II (Moderate Resource Intensity)   AM-PAC Daily Activity Inpatient   Lower Body Dressing 2   Bathing 2   Toileting 2   Upper Body Dressing 3   Grooming 4   Eating 4   Daily Activity Raw Score 17   Daily Activity Standardized Score (Calc for Raw Score >=11) 37.26   AM-PAC Applied Cognition Inpatient   Following a Speech/Presentation 3   Understanding Ordinary Conversation 3   Taking Medications 2   Remembering Where Things Are Placed or Put Away 4   Remembering List of 4-5 Errands 2   Taking Care of Complicated Tasks 2   Applied Cognition Raw Score 16   Applied Cognition Standardized Score 35.03   Barthel Index   Feeding 5   Bathing 0   Grooming Score 0   Dressing Score 5   Bladder Score 5   Bowels Score 5   Toilet Use Score 5   Transfers (Bed/Chair) Score 5   Mobility (Level Surface) Score 0   Stairs Score 0   Barthel Index Score 30   End of Consult   Education Provided Yes   Patient Position at End of Consult Bedside " chair;Bed/Chair alarm activated;All needs within reach   Nurse Communication Nurse aware of consult   Licensure   [place] License Number  [id number] PORTER Maldonado, OTR/L       The patient's raw score on the AM-PAC Daily Activity Inpatient Short Form is 17. A raw score of less than 19 suggests the patient may benefit from discharge to post-acute rehabilitation services. Please refer to the recommendation of the Occupational Therapist for safe discharge planning.      Pt goals to be met by 6/8/25 to max I w/ ADLs and improve engagement to return to PLOF in her own bed includes:    -Pt will complete bed mobility supine <> sit w/ S in preparation for ADLs to minimize burden of care    -Pt will complete functional transfers to bed, chair, and toilet using LRAD, DME as needed w/ S    -Pt will complete functional shower transfer using LRAD, DME as needed w/ min A to max I w/ bathing     -Pt will complete LBD w/ min A using LHAE as needed    -Pt will complete UBD w/ S after set- up    -Pt will demonstrate improved sustained activity tolerance for at least 15 minutes while engaged in ADLs vs preferred leisure task to minimize burden of care to return to PLOF    -Pt will demonstrate improved activity tolerance and sitting tolerance OOB In chair for all meals    -Pt will demonstrate improved AMPAC score to at least 21 to max I w/ ADLs, assist in DC planning.     -Pt will demonstrate good attention and participation in ongoing eval of functional mobility using LRAD to / from bathroom to assist inDC Planning.     -Pt will demonstrate improved functional standing tolerance for at least 10 minutes using LRAD w/ at least fair - balance to max I w/ functional mobility to return to PLOF, access dining room for meals    Rupinder Okeefe, OTR/L  [id number]  [id number]            [1]   Past Medical History:  Diagnosis Date    Anxiety     Arthritis     Gastroparesis     History of atherosclerosis     History of breast cancer     History of  osteoarthritis     Long term use of drug     Malignant neoplasm of breast (HCC) 04/11/2011    left w/mastectomy; age 85    Osteomalacia    [2]   Past Surgical History:  Procedure Laterality Date    BREAST BIOPSY Left 04/11/2011    u/s core-positive    HYSTERECTOMY  1974    age 49    MASTECTOMY Left 05/04/2011    malignant

## 2025-06-01 NOTE — PLAN OF CARE
Problem: OCCUPATIONAL THERAPY ADULT  Goal: Performs self-care activities at highest level of function for planned discharge setting.  See evaluation for individualized goals.  Description: Treatment Interventions: ADL retraining, Functional transfer training, Endurance training, Patient/family training, Equipment evaluation/education, Compensatory technique education, Energy conservation, Activityengagement, Continued evaluation          See flowsheet documentation for full assessment, interventions and recommendations.   Note: Limitation: Decreased ADL status, Decreased UE strength, Decreased cognition, Decreased endurance, Decreased self-care trans, Decreased high-level ADLs (impaired pain, activity tolerance, standing tolerance, balance, forward functional reach, sitting tolerance)     Assessment: Pt is a 89yo female admitted to [facility] on 5/30/25 as a trauma following a fall. Diagnosed w/ pneumocephalus and found to have nasal bone fractures. OMFS and Neurosurgery consulted; Recommend sinus precautions. Significant PMH impacting her occupational performance includes Parkinson's disease, osteoarthritis L shoulder, breast cancer hx s/p L mastectomy (2011), anxiety, arthritis. Pt resides at [facility] and needs assistance w/ IADLs. Pt reports supportive staff assist w/ bathing and as needed w/ dressing. Pt reports I w/ toileting using RW at baseline. Upon eval, pt responsive and cooperative. Pt demonstrated decreased activity tolerance reporting fatigue. Pt required min A UBD, max A LBD, min A UB bathing while seated, mod A sit <> stand to RW. Pt declined additional sit to stand or functional mobility reporting fatigue. Pt generally oriented to person, place, month/ year and situation. Pt is completing ADLs below baseline level of I w/ decreased activity tolerance, standing tolerance, balance, forward functional reach, and impaired pain. Pt would benefit from OT In acute care to max I w/ ADLs, achieve  highest level of function. Recommend level II rehab resource intensity when medically stable for discharge from acute care at this time pend improved activity tolerance/ support, assist at PLOF. Will continue to follow  Recommendation: Geriatric Consult  Rehab Resource Intensity Level, OT: II (Moderate Resource Intensity)

## 2025-06-01 NOTE — PLAN OF CARE
Problem: PAIN - ADULT  Goal: Verbalizes/displays adequate comfort level or baseline comfort level  Description: Interventions:  - Encourage patient to monitor pain and request assistance  - Assess pain using appropriate pain scale  - Administer analgesics as ordered based on type and severity of pain and evaluate response  - Implement non-pharmacological measures as appropriate and evaluate response  - Consider cultural and social influences on pain and pain management  - Notify physician/advanced practitioner if interventions unsuccessful or patient reports new pain  - Educate patient/family on pain management process including their role and importance of  reporting pain   - Provide non-pharmacologic/complimentary pain relief interventions  Outcome: Progressing     Problem: INFECTION - ADULT  Goal: Absence or prevention of progression during hospitalization  Description: INTERVENTIONS:  - Assess and monitor for signs and symptoms of infection  - Monitor lab/diagnostic results  - Monitor all insertion sites, i.e. indwelling lines, tubes, and drains  - Monitor endotracheal if appropriate and nasal secretions for changes in amount and color  - [place] appropriate cooling/warming therapies per order  - Administer medications as ordered  - Instruct and encourage patient and family to use good hand hygiene technique  - Identify and instruct in appropriate isolation precautions for identified infection/condition  Outcome: Progressing  Goal: Absence of fever/infection during neutropenic period  Description: INTERVENTIONS:  - Monitor WBC  - Perform strict hand hygiene  - Limit to healthy visitors only  - No plants, dried, fresh or silk flowers with mccormick in patient room  Outcome: Progressing     Problem: SAFETY ADULT  Goal: Patient will remain free of falls  Description: INTERVENTIONS:  - Educate patient/family on patient safety including physical limitations  - Instruct patient to call for assistance with activity   -  Consider consulting OT/PT to assist with strengthening/mobility based on AM PAC & JH-HLM score  - Consult OT/PT to assist with strengthening/mobility   - Keep Call bell within reach  - Keep bed low and locked with side rails adjusted as appropriate  - Keep care items and personal belongings within reach  - Initiate and maintain comfort rounds  - Make Fall Risk Sign visible to staff  - Offer Toileting every  Hours, in advance of need  - Initiate/Maintain alarm  - Obtain necessary fall risk management equipment:   - Apply yellow socks and bracelet for high fall risk patients  - Consider moving patient to room near nurses station  Outcome: Progressing  Goal: Maintain or return to baseline ADL function  Description: INTERVENTIONS:  -  Assess patient's ability to carry out ADLs; assess patient's baseline for ADL function and identify physical deficits which impact ability to perform ADLs (bathing, care of mouth/teeth, toileting, grooming, dressing, etc.)  - Assess/evaluate cause of self-care deficits   - Assess range of motion  - Assess patient's mobility; develop plan if impaired  - Assess patient's need for assistive devices and provide as appropriate  - Encourage maximum independence but intervene and supervise when necessary  - Involve family in performance of ADLs  - Assess for home care needs following discharge   - Consider OT consult to assist with ADL evaluation and planning for discharge  - Provide patient education as appropriate  - Monitor functional capacity and physical performance, use of AM PAC & JH-HLM   - Monitor gait, balance and fatigue with ambulation    Outcome: Progressing  Goal: Maintains/Returns to pre admission functional level  Description: INTERVENTIONS:  - Perform AM-PAC 6 Click Basic Mobility/ Daily Activity assessment daily.  - Set and communicate daily mobility goal to care team and patient/family/caregiver.   - Collaborate with rehabilitation services on mobility goals if consulted  -  Perform Range of Motion  times a day.  - Reposition patient every  hours.  - Dangle patient  times a day  - Stand patient  times a day  - Ambulate patient  times a day  - Out of bed to chair  times a day   - Out of bed for meals  times a day  - Out of bed for toileting  - Record patient progress and toleration of activity level   Outcome: Progressing     Problem: DISCHARGE PLANNING  Goal: Discharge to home or other facility with appropriate resources  Description: INTERVENTIONS:  - Identify barriers to discharge w/patient and caregiver  - Arrange for needed discharge resources and transportation as appropriate  - Identify discharge learning needs (meds, wound care, etc.)  - Arrange for interpretive services to assist at discharge as needed  - Refer to Case Management Department for coordinating discharge planning if the patient needs post-hospital services based on physician/advanced practitioner order or complex needs related to functional status, cognitive ability, or social support system  Outcome: Progressing     Problem: Knowledge Deficit  Goal: Patient/family/caregiver demonstrates understanding of disease process, treatment plan, medications, and discharge instructions  Description: Complete learning assessment and assess knowledge base.  Interventions:  - Provide teaching at level of understanding  - Provide teaching via preferred learning methods  Outcome: Progressing     Problem: Prexisting or High Potential for Compromised Skin Integrity  Goal: Skin integrity is maintained or improved  Description: INTERVENTIONS:  - Identify patients at risk for skin breakdown  - Assess and monitor skin integrity including under and around medical devices   - Assess and monitor nutrition and hydration status  - Monitor labs  - Assess for incontinence   - Turn and reposition patient  - Assist with mobility/ambulation  - Relieve pressure over elizabeth prominences   - Avoid friction and shearing  - Provide appropriate hygiene  as needed including keeping skin clean and dry  - Evaluate need for skin moisturizer/barrier cream  - Collaborate with interdisciplinary team  - Patient/family teaching  - Consider wound care consult    Assess:  - Review Calvin scale daily  - Clean and moisturize skin every   - Inspect skin when repositioning, toileting, and assisting with ADLS  - Assess under medical devices such as  every   - Assess extremities for adequate circulation and sensation     Bed Management:  - Have minimal linens on bed & keep smooth, unwrinkled  - Change linens as needed when moist or perspiring  - Avoid sitting or lying in one position for more than  hours while in bed?Keep HOB at degrees   - Toileting:  - Offer bedside commode  - Assess for incontinence every   - Use incontinent care products after each incontinent episode such as     Activity:  - Mobilize patient  times a day  - Encourage activity and walks on unit  - Encourage or provide ROM exercises   - Turn and reposition patient every  Hours  - Use appropriate equipment to lift or move patient in bed  - Instruct/ Assist with weight shifting every  when out of bed in chair  - Consider limitation of chair time  hour intervals    Skin Care:  - Avoid use of baby powder, tape, friction and shearing, hot water or constrictive clothing  - Relieve pressure over bony prominences using  - Do not massage red bony areas    Next Steps:  - Teach patient strategies to minimize risks such as   - Consider consults to  interdisciplinary teams such as   Outcome: Progressing

## 2025-06-01 NOTE — PROGRESS NOTES
Patient:    MRN:  [id number]    Willie Request ID:  [id number]    Level of care reserved:  Skilled Nursing Facility    Partner Reserved:  [facility] [place], [place], [place] [id number] [phone]    Clinical needs requested:    Geography searched:  10 miles around [phone]    Start of Service:    Request sent:  11:28am EDT on 5/31/2025 by Luh Clarke    Partner reserved:  1:25pm EDT on 6/1/2025 by Giovanni Bearden    Choice list shared:  1:25pm EDT on 6/1/2025 by Giovanni Bearden

## 2025-06-01 NOTE — PROGRESS NOTES
Progress Note - Trauma   Name: Bri Noel 89 y.o. female I MRN: [id number]  Unit/Bed#: W -01 I Date of Admission: 5/30/2025   Date of Service: 6/1/2025 I Hospital Day: 2    Assessment & Plan  Fall  - Status post fall with the below noted injuries.  - Fall precautions.  - Geriatric Medicine consultation for evaluation, medication review and recommendations.  - PT and OT evaluation and treatment as indicated.  - Case Management consultation for disposition planning.  Pneumocephalus  - Patient noted to have scattered pneumocephalus on initial CT imaging without definitive evidence of calvarial fracture.  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  - Awaiting OMFS and neurosurgery consultations and recommendations.  - May use ice for pain and swelling of face.  - Analgesia as needed.  - No further workup anticipated at this time.  - PT and OT evaluation shims indicated.  Case management consulted for disposition planning.  Closed fracture of nasal bone  - Patient with acute closed mild bilateral distal nasal bone fractures, present on presentation.  - Await OMS evaluation and recommendations.  - May use ice for pain and swelling.  - Multimodal analgesia as indicated.  - Maintain sinus precautions.  Acute pain of left shoulder  Primary osteoarthritis of left shoulder  - Patient with left shoulder and upper arm pain on presentation in setting of chronic history of left shoulder osteoarthritis.  - X-ray imaging from 5/30/2025 reviewed with no evidence of acute traumatic injury or osseous abnormality; degenerative changes were noted.  - May remain weightbearing as tolerated on the left upper extremity with activity as tolerated.  - Analgesia as needed.  - May use ice for pain and  swelling.  - PT and OT evaluation shims indicated.  - Outpatient follow-up with Orthopedic surgery recommended.  Parkinson's disease (HCC)  - Patient with chronic history of Parkinson's disease.  - Maintain fall precautions.  - Continue/resume home medication therapy as appropriate.  - PT and OT evaluation and treatment as indicated.  - Outpatient follow-up per routine.  Primary hypertension  - Patient with chronic history of primary hypertension and elevated blood pressures during hospital encounter.  - Continue/resume home medication therapy as appropriate.  - Outpatient follow-up per routine.      VTE Prophylaxis:VTE covered by:  [START ON 6/2/2025] enoxaparin, Subcutaneous         Disposition: STR placement Please contact the SecureChat role for the Trauma service with any questions/concerns.    24 Hour Events : No acute events  Subjective : Denies any complaints except that the bread for her sandwich is hard. Eating and drinking normally. Urinating and defecating normally.     Objective :  Temp:  [97.8 °F (36.6 °C)-98.5 °F (36.9 °C)] 97.8 °F (36.6 °C)  HR:  [66-85] 79  BP: (129-163)/(69-83) 129/69  Resp:  [18] 18  SpO2:  [94 %-96 %] 94 %    I/O         05/30 0701  05/31 0700 05/31 0701  06/01 0700 06/01 0701  06/02 0700    P.O.  240 360    I.V. (mL/kg)  292.5 (5.2)     IV Piggyback 100      Total Intake(mL/kg) 100 (1.8) 532.5 (9.5) 360 (6.4)    Net +100 +532.5 +360           Unmeasured Urine Occurrence  1 x 1 x    Unmeasured Stool Occurrence  2 x             Physical Exam  Constitutional:       General: She is not in acute distress.     Appearance: Normal appearance. She is normal weight. She is not ill-appearing, toxic-appearing or diaphoretic.   HENT:      Head: Normocephalic and atraumatic.      Right Ear: External ear normal.      Left Ear: External ear normal.      Nose: Nose normal.      Mouth/Throat:      Mouth: Mucous membranes are moist.     Eyes:      Extraocular Movements: Extraocular movements  intact.       Cardiovascular:      Rate and Rhythm: Normal rate and regular rhythm.      Pulses: Normal pulses.      Heart sounds: Normal heart sounds.   Pulmonary:      Effort: Pulmonary effort is normal.      Breath sounds: Normal breath sounds.   Abdominal:      Palpations: Abdomen is soft.     Musculoskeletal:         General: No swelling, tenderness, deformity or signs of injury.      Cervical back: Neck supple.     Skin:     General: Skin is warm and dry.      Coloration: Skin is not jaundiced or pale.      Findings: Bruising (Face) present. No erythema, lesion or rash.     Neurological:      Mental Status: She is alert. Mental status is at baseline.               Lab Results: I have reviewed the following results:  Recent Labs     05/30/25  1335   WBC 5.72   HGB 13.5   HCT 40.3      SODIUM 139   K 4.2      CO2 29   BUN 14   CREATININE 0.61   GLUC 95   AST 12*   ALT <3*   ALB 4.0   TBILI 0.56   ALKPHOS 56   HSTNI0 3       Imaging Results Review: No pertinent imaging studies reviewed.  Other Study Results Review: No additional pertinent studies reviewed.

## 2025-06-02 ENCOUNTER — TELEPHONE (OUTPATIENT)
Dept: NEUROSURGERY | Facility: CLINIC | Age: OVER 89
End: 2025-06-02

## 2025-06-02 PROBLEM — R41.89 COGNITIVE IMPAIRMENT: Status: ACTIVE | Noted: 2025-06-02

## 2025-06-02 PROBLEM — Z91.89 AT RISK FOR DELIRIUM: Status: ACTIVE | Noted: 2025-06-02

## 2025-06-02 LAB
ATRIAL RATE: 89 BPM
P AXIS: 80 DEGREES
PR INTERVAL: 176 MS
QRS AXIS: 34 DEGREES
QRSD INTERVAL: 80 MS
QT INTERVAL: 354 MS
QTC INTERVAL: 430 MS
T WAVE AXIS: 32 DEGREES
VENTRICULAR RATE: 89 BPM

## 2025-06-02 PROCEDURE — 87081 CULTURE SCREEN ONLY: CPT | Performed by: SURGERY

## 2025-06-02 PROCEDURE — 93010 ELECTROCARDIOGRAM REPORT: CPT | Performed by: INTERNAL MEDICINE

## 2025-06-02 PROCEDURE — 99232 SBSQ HOSP IP/OBS MODERATE 35: CPT | Performed by: STUDENT IN AN ORGANIZED HEALTH CARE EDUCATION/TRAINING PROGRAM

## 2025-06-02 PROCEDURE — 99223 1ST HOSP IP/OBS HIGH 75: CPT | Performed by: FAMILY MEDICINE

## 2025-06-02 RX ORDER — SODIUM CHLORIDE, SODIUM GLUCONATE, SODIUM ACETATE, POTASSIUM CHLORIDE, MAGNESIUM CHLORIDE, SODIUM PHOSPHATE, DIBASIC, AND POTASSIUM PHOSPHATE .53; .5; .37; .037; .03; .012; .00082 G/100ML; G/100ML; G/100ML; G/100ML; G/100ML; G/100ML; G/100ML
500 INJECTION, SOLUTION INTRAVENOUS ONCE
Status: COMPLETED | OUTPATIENT
Start: 2025-06-02 | End: 2025-06-02

## 2025-06-02 RX ADMIN — CARBIDOPA AND LEVODOPA 1 TABLET: 25; 100 TABLET ORAL at 21:30

## 2025-06-02 RX ADMIN — SENNOSIDES AND DOCUSATE SODIUM 1 TABLET: 50; 8.6 TABLET ORAL at 21:30

## 2025-06-02 RX ADMIN — ESCITALOPRAM OXALATE 10 MG: 10 TABLET ORAL at 08:38

## 2025-06-02 RX ADMIN — CARBIDOPA AND LEVODOPA 1 TABLET: 25; 100 TABLET ORAL at 16:00

## 2025-06-02 RX ADMIN — POLYETHYLENE GLYCOL 3350 17 G: 17 POWDER, FOR SOLUTION ORAL at 08:37

## 2025-06-02 RX ADMIN — Medication 2.5 MG: at 19:12

## 2025-06-02 RX ADMIN — ENOXAPARIN SODIUM 40 MG: 40 INJECTION SUBCUTANEOUS at 08:37

## 2025-06-02 RX ADMIN — BACITRACIN 1 SMALL APPLICATION: 500 OINTMENT TOPICAL at 17:05

## 2025-06-02 RX ADMIN — ACETAMINOPHEN 975 MG: 325 TABLET ORAL at 13:37

## 2025-06-02 RX ADMIN — Medication 2000 UNITS: at 08:37

## 2025-06-02 RX ADMIN — AMLODIPINE BESYLATE 2.5 MG: 2.5 TABLET ORAL at 08:37

## 2025-06-02 RX ADMIN — SODIUM CHLORIDE, SODIUM GLUCONATE, SODIUM ACETATE, POTASSIUM CHLORIDE, MAGNESIUM CHLORIDE, SODIUM PHOSPHATE, DIBASIC, AND POTASSIUM PHOSPHATE 500 ML: .53; .5; .37; .037; .03; .012; .00082 INJECTION, SOLUTION INTRAVENOUS at 13:07

## 2025-06-02 RX ADMIN — BACITRACIN 1 SMALL APPLICATION: 500 OINTMENT TOPICAL at 08:37

## 2025-06-02 RX ADMIN — CARBIDOPA AND LEVODOPA 1 TABLET: 25; 100 TABLET ORAL at 08:37

## 2025-06-02 RX ADMIN — ACETAMINOPHEN 975 MG: 325 TABLET ORAL at 21:30

## 2025-06-02 RX ADMIN — ACETAMINOPHEN 975 MG: 325 TABLET ORAL at 05:39

## 2025-06-02 NOTE — TELEPHONE ENCOUNTER
6/4/25: SPOKE W/ MAYO AND FAXED OVER THE CTH ORDER TO HER AND CONFIRMED HFU APPT    6/3/25: DISCHARGED TO [facility]    INPATIENT: 5/30/25-6/3/25    2 wk HFU w/ AP [facility]    6/19  9:15  [facility]    Imaging: CT (LAST: 5/30/25)    Per:    ----- Message from Candelaria Lao PA-C sent at 5/31/2025  7:47 AM EDT -----  Regarding: Hospital follow-up  2-week hospital follow-up with AP and CT head for pneumocephalus and possible sphenoid sinus injury

## 2025-06-02 NOTE — ASSESSMENT & PLAN NOTE
Patient presented to the hospital with bilateral distal nasal bone fractures  She has seen by OMFS.  No surgical intervention recommended.  Continue sinus precautions  Optimize pain regimen

## 2025-06-02 NOTE — CASE MANAGEMENT
Case Management Discharge Planning Note    Patient name Bri Noel  Location W /W -01 MRN [id number]  : 1925 Date 2025       Current Admission Date: 2025  Current Admission Diagnosis:Pneumocephalus   Patient Active Problem List    Diagnosis Date Noted    Cognitive impairment 2025    At risk for delirium 2025    Fall 2025    Acute pain of left shoulder 2025    Closed fracture of nasal bone 2025    Pneumocephalus 2025    History of 2019 novel coronavirus disease (COVID-19) 2023    Primary osteoarthritis of left shoulder 2023    Left leg swelling 2022    Other chest pain 2021    REM behavioral disorder 2021    Depression with anxiety 2018    Lumbar radiculopathy 2018    Tremor 2018    Primary hypertension 2018    Parkinson's disease (HCC) 2017    Hoarseness 2017    Esophageal reflux 2016    Vitamin D deficiency 2015    Breast cancer (HCC) 2015    Anemia 10/21/2014    Nontoxic single thyroid nodule 2013    Arthritis 12/10/2012    Atherosclerosis 12/10/2012    Gastroparesis 12/10/2012    Hyperlipidemia 2012    Osteoporosis 2012      LOS (days): 3  Geometric Mean LOS (GMLOS) (days): 2.2  Days to GMLOS:-0.8     OBJECTIVE:  Risk of Unplanned Readmission Score: 14.72         Current admission status: Inpatient   Preferred Pharmacy:   [id number] - [place] - [address] [place] [address]  Phone: [phone] Fax: [phone]    [facility] - [place] - [address] [place] [address]  Phone: [phone] Fax: [phone]    [facility] - [facility] [place] - [address]  [address] [facility] [place] [address]  Phone: [phone] Fax: [phone]    Primary Care Provider: Sanchez Salazar  DO    Primary Insurance: MEDICARE  Secondary Insurance: BLUE CROSS    DISCHARGE DETAILS:    Discharge planning discussed with:: Daughter Jennifer  Freedom of Choice: Yes     CM contacted family/caregiver?: Yes  Were Treatment Team discharge recommendations reviewed with patient/caregiver?: Yes  Did patient/caregiver verbalize understanding of patient care needs?: Yes  Were patient/caregiver advised of the risks associated with not following Treatment Team discharge recommendations?: Yes    Contacts  Patient Contacts: Daughter Jennifer  Relationship to Patient:: Family  Contact Method: Phone  Phone Number: [phone]  Reason/Outcome: Discharge Planning    Requested Home Health Care         Is the patient interested in HHC at discharge?: No    DME Referral Provided  Referral made for DME?: No    Other Referral/Resources/Interventions Provided:  Interventions: Short Term Rehab  Referral Comments: CM reserved [facility] STR [facility].      Treatment Team Recommendation: Short Term Rehab  Discharge Destination Plan:: Short Term Rehab    CM called patient's daughter Jennifer to introduce self/role and to confirm STR facility choice. Jennifer confirmed [facility] will be choice for patient. CM discussed likely discharge tomorrow, 6/3 and made Jennifer aware CM will be in contact in the morning to confirm transport arrangements to STR facility.

## 2025-06-02 NOTE — ASSESSMENT & PLAN NOTE
Patient uses a walker for ambulation at baseline  She reports recent fall  Associated symptoms -dizziness  Monitor orthostatic vital signs  Encourage p.o. hydration  Avoid hypotension and hypoglycemia   PT/OT as tolerated

## 2025-06-02 NOTE — PLAN OF CARE
Problem: PAIN - ADULT  Goal: Verbalizes/displays adequate comfort level or baseline comfort level  Description: Interventions:  - Encourage patient to monitor pain and request assistance  - Assess pain using appropriate pain scale  - Administer analgesics as ordered based on type and severity of pain and evaluate response  - Implement non-pharmacological measures as appropriate and evaluate response  - Consider cultural and social influences on pain and pain management  - Notify physician/advanced practitioner if interventions unsuccessful or patient reports new pain  - Educate patient/family on pain management process including their role and importance of  reporting pain   - Provide non-pharmacologic/complimentary pain relief interventions  Outcome: Progressing     Problem: INFECTION - ADULT  Goal: Absence or prevention of progression during hospitalization  Description: INTERVENTIONS:  - Assess and monitor for signs and symptoms of infection  - Monitor lab/diagnostic results  - Monitor all insertion sites, i.e. indwelling lines, tubes, and drains  - Monitor endotracheal if appropriate and nasal secretions for changes in amount and color  - Kent appropriate cooling/warming therapies per order  - Administer medications as ordered  - Instruct and encourage patient and family to use good hand hygiene technique  - Identify and instruct in appropriate isolation precautions for identified infection/condition  Outcome: Progressing  Goal: Absence of fever/infection during neutropenic period  Description: INTERVENTIONS:  - Monitor WBC  - Perform strict hand hygiene  - Limit to healthy visitors only  - No plants, dried, fresh or silk flowers with mccormick in patient room  Outcome: Progressing     Problem: SAFETY ADULT  Goal: Patient will remain free of falls  Description: INTERVENTIONS:  - Educate patient/family on patient safety including physical limitations  - Instruct patient to call for assistance with activity   -  Consider consulting OT/PT to assist with strengthening/mobility based on AM PAC & JH-HLM score  - Consult OT/PT to assist with strengthening/mobility   - Keep Call bell within reach  - Keep bed low and locked with side rails adjusted as appropriate  - Keep care items and personal belongings within reach  - Initiate and maintain comfort rounds  - Make Fall Risk Sign visible to staff  - Offer Toileting every  Hours, in advance of need  - Initiate/Maintain alarm  - Obtain necessary fall risk management equipment:   - Apply yellow socks and bracelet for high fall risk patients  - Consider moving patient to room near nurses station  Outcome: Progressing  Goal: Maintain or return to baseline ADL function  Description: INTERVENTIONS:  -  Assess patient's ability to carry out ADLs; assess patient's baseline for ADL function and identify physical deficits which impact ability to perform ADLs (bathing, care of mouth/teeth, toileting, grooming, dressing, etc.)  - Assess/evaluate cause of self-care deficits   - Assess range of motion  - Assess patient's mobility; develop plan if impaired  - Assess patient's need for assistive devices and provide as appropriate  - Encourage maximum independence but intervene and supervise when necessary  - Involve family in performance of ADLs  - Assess for home care needs following discharge   - Consider OT consult to assist with ADL evaluation and planning for discharge  - Provide patient education as appropriate  - Monitor functional capacity and physical performance, use of AM PAC & JH-HLM   - Monitor gait, balance and fatigue with ambulation    Outcome: Progressing  Goal: Maintains/Returns to pre admission functional level  Description: INTERVENTIONS:  - Perform AM-PAC 6 Click Basic Mobility/ Daily Activity assessment daily.  - Set and communicate daily mobility goal to care team and patient/family/caregiver.   - Collaborate with rehabilitation services on mobility goals if consulted  -  Perform Range of Motion  times a day.  - Reposition patient every  hours.  - Dangle patient  times a day  - Stand patient  times a day  - Ambulate patient  times a day  - Out of bed to chair  times a day   - Out of bed for meals  times a day  - Out of bed for toileting  - Record patient progress and toleration of activity level   Outcome: Progressing     Problem: DISCHARGE PLANNING  Goal: Discharge to home or other facility with appropriate resources  Description: INTERVENTIONS:  - Identify barriers to discharge w/patient and caregiver  - Arrange for needed discharge resources and transportation as appropriate  - Identify discharge learning needs (meds, wound care, etc.)  - Arrange for interpretive services to assist at discharge as needed  - Refer to Case Management Department for coordinating discharge planning if the patient needs post-hospital services based on physician/advanced practitioner order or complex needs related to functional status, cognitive ability, or social support system  Outcome: Progressing     Problem: Knowledge Deficit  Goal: Patient/family/caregiver demonstrates understanding of disease process, treatment plan, medications, and discharge instructions  Description: Complete learning assessment and assess knowledge base.  Interventions:  - Provide teaching at level of understanding  - Provide teaching via preferred learning methods  Outcome: Progressing     Problem: Prexisting or High Potential for Compromised Skin Integrity  Goal: Skin integrity is maintained or improved  Description: INTERVENTIONS:  - Identify patients at risk for skin breakdown  - Assess and monitor skin integrity including under and around medical devices   - Assess and monitor nutrition and hydration status  - Monitor labs  - Assess for incontinence   - Turn and reposition patient  - Assist with mobility/ambulation  - Relieve pressure over elizabeth prominences   - Avoid friction and shearing  - Provide appropriate hygiene  as needed including keeping skin clean and dry  - Evaluate need for skin moisturizer/barrier cream  - Collaborate with interdisciplinary team  - Patient/family teaching  - Consider wound care consult    Assess:  - Review Calvin scale daily  - Clean and moisturize skin every   - Inspect skin when repositioning, toileting, and assisting with ADLS  - Assess under medical devices such as  every   - Assess extremities for adequate circulation and sensation     Bed Management:  - Have minimal linens on bed & keep smooth, unwrinkled  - Change linens as needed when moist or perspiring  - Avoid sitting or lying in one position for more than  hours while in bed?Keep HOB at degrees   - Toileting:  - Offer bedside commode  - Assess for incontinence every   - Use incontinent care products after each incontinent episode such as     Activity:  - Mobilize patient  times a day  - Encourage activity and walks on unit  - Encourage or provide ROM exercises   - Turn and reposition patient ev Hours  - Use appropriate equipment to lift or move patient in bed  - Instruct/ Assist with weight shifting every  when out of bed in chair  - Consider limitation of chair time  hour intervals    Skin Care:  - Avoid use of baby powder, tape, friction and shearing, hot water or constrictive clothing  - Relieve pressure over bony prominences using   - Do not massage red bony areas    Next Steps:  - Teach patient strategies to minimize risks such as   - Consider consults to  interdisciplinary teams such as   Outcome: Progressing

## 2025-06-02 NOTE — ASSESSMENT & PLAN NOTE
Currently stable, no behaviors noted.  Scored 4/5 on minicog.  She needs assistance with all IADLs at baseline  Will continue to provide supportive care, reorient as needed.  Patient is at high risk for delirium, will monitor closely and place on delirium precautions.  Maintain sleep/wake cycle.  Optimize pain regimen.  Monitor for constipation and urinary retention and manage as needed.  Check B12 level and TSH  Encourage family to visit.  Encourage to wear glasses and hearing aids while awake.  Encourage po intake, assist with feeding if needed.

## 2025-06-02 NOTE — QUICK NOTE
Spoke with patient's daughter, Jennifer and updated them on the patient's current condition, care management plan, and goals. All questions were addressed. No other concerns at this time.

## 2025-06-02 NOTE — CONSULTS
Consultation - Geriatric Medicine   Bri Noel 89 y.o. female MRN: [id number]  Unit/Bed#: W -01 Encounter: [id number]      Assessment & Plan     At risk for delirium  Assessment & Plan    -Patient is high risk of delirium due to cognitive impairment at baseline, hospitalization, hearing impairment  - delirium precautions  -maintain normal sleep/wake cycle  -minimize overnight interruptions, group overnight vitals/labs/nursing checks as possible  -dim lights, close blinds and turn off tv to minimize stimulation and encourage sleep environment in evenings  -ensure that pain is well controlled   -monitor for fecal and urinary retention which may precipitate delirium  -encourage early mobilization and ambulation  -provide frequent reorientation and redirection  -encourage family and friends at the bedside to help calm patient if anxious  -avoid medications which may precipitate or worsen delirium such as tramadol, benzodiazepine, anticholinergics, and antihistaminics  -encourage hydration and nutrition , assist with feeding if needed  -redirect unwanted behaviors as first line, avoid physical restraints.   - Of note patient was taking tramadol 50 mg 3 times a day at the facility, avoid use in the future.  Monitor for signs of withdrawal    Cognitive impairment  Assessment & Plan  Currently stable, no behaviors noted.  Scored 4/5 on minicog.  She needs assistance with all IADLs at baseline  Will continue to provide supportive care, reorient as needed.  Patient is at high risk for delirium, will monitor closely and place on delirium precautions.  Maintain sleep/wake cycle.  Optimize pain regimen.  Monitor for constipation and urinary retention and manage as needed.  Check B12 level and TSH  Encourage family to visit.  Encourage to wear glasses and hearing aids while awake.  Encourage po intake, assist with feeding if needed.     Closed fracture of nasal bone  Assessment & Plan  Patient presented to the hospital  with bilateral distal nasal bone fractures  She has seen by OMFS.  No surgical intervention recommended.  Continue sinus precautions  Optimize pain regimen      Fall  Assessment & Plan  Patient uses a walker for ambulation at baseline  She reports recent fall  Associated symptoms -dizziness  Monitor orthostatic vital signs  Encourage p.o. hydration  Avoid hypotension and hypoglycemia   PT/OT as tolerated    Parkinson's disease (HCC)  Assessment & Plan  Continue Sinemet  PT/OT as tolerated              History of Present Illness   Physician Requesting Consult: Maura ORTIZ To, DO  Reason for Consult / Principal Problem: Fall  Hx and PE limited by:   Additional history obtained from:       HPI: Bri Noel is a 89 y.o. year old female with PMH consisting of but not limited to osteoarthritis, gastroparesis, CVA, osteomalacia, and anxiety who presents with pneumocephalus status post fall.  She reported a sudden onset of dizziness, lightheadedness, and syncope immediately prior to fall.     Bri lives at [facility] living [facility] where she ambulates using a walker, however was not using a walker at time of fall.  She wears glasses and relies on facility staff as well as family to drive her from place to place. Facility staff manage her medications.  She reports having a living will as well as a DNR. her daughter, Jennifer, manages her finances and is appointed power of .    On examination Bri was sitting calmly in a chair without apparent discomfort.  She is alert and oriented to person place and time.  She scored 4 of 5 on mini cog.  She reports sleeping and eating well; this was supported by nursing staff.  She complained of a painful bruise on her left upper arm as well as discomfort with the vitals monitor on her right hand.  She reports no dizziness but a mild headache currently. She has not been drinking much fluids however reports that this is baseline for her.  She requested a coffee; I  obtained one for her. She reports having felt depressed yesterday as she says that she had very minimal social contact. She voiced some anxiety, fearing that she will not make it to her 89th birthday this July and states that she does not know why this all is happening to her as she has been very healthy in the past.     Inpatient consult to Gerontology  Consult performed by: Emperatriz Anne MD  Consult ordered by: Carlin Lim MD          Review of Systems   Constitutional:  Negative for chills and fever.   HENT:  Negative for ear pain, rhinorrhea and sneezing.    Eyes:  Negative for visual disturbance.   Respiratory:  Positive for shortness of breath (intermittently with exertion). Negative for cough and wheezing.    Cardiovascular:  Negative for chest pain, palpitations and leg swelling.   Gastrointestinal:  Negative for abdominal pain, constipation, nausea and vomiting.   Endocrine: Negative for cold intolerance.   Genitourinary:  Negative for difficulty urinating, dysuria and hematuria.   Musculoskeletal:  Positive for gait problem. Negative for arthralgias and myalgias.   Skin:  Positive for wound.   Allergic/Immunologic: Negative for environmental allergies.   Neurological:  Positive for dizziness, light-headedness (orthostatic) and headaches. Negative for seizures.   Psychiatric/Behavioral:  Positive for dysphoric mood (depressed yesterday). Negative for behavioral problems and sleep disturbance.            Historical Information   Past Medical History[1]  Past Surgical History[2]  Social History   Social History     Substance and Sexual Activity   Alcohol Use Not Currently    Comment: Social drinker     Social History     Substance and Sexual Activity   Drug Use No     Tobacco Use History[3]  Family History: Family History[4]    Meds/Allergies   all current active meds have been reviewed and current meds:   Current Facility-Administered Medications:     acetaminophen (TYLENOL) tablet 975 mg, Q8H JOSH     amLODIPine (NORVASC) tablet 2.5 mg, Daily    bacitracin topical ointment 1 small application, BID    carbidopa-levodopa (SINEMET)  mg per tablet 1 tablet, TID    Cholecalciferol (VITAMIN D3) tablet 2,000 Units, Daily    enoxaparin (LOVENOX) subcutaneous injection 40 mg, Q24H JOSH    escitalopram (LEXAPRO) tablet 10 mg, Daily    hydrALAZINE (APRESOLINE) injection 5 mg, Q4H PRN    HYDROmorphone HCl (DILAUDID) injection 0.2 mg, Q2H PRN    ondansetron (ZOFRAN) injection 4 mg, Q6H PRN    oxyCODONE (ROXICODONE) split tablet 2.5 mg, Q4H PRN **OR** oxyCODONE (ROXICODONE) IR tablet 5 mg, Q4H PRN    polyethylene glycol (MIRALAX) packet 17 g, Daily    senna-docusate sodium (SENOKOT S) 8.6-50 mg per tablet 1 tablet, HS    Home meds:  Acetaminophen 1000 mg once a day  Amlodipine 2.5 mg once a day  Carbidopa/levo 25 mg/100 mg 3 times a day  Escitalopram 10 mg once a day  Remedy Prot zinc paste van as needed  Tramadol 50 mg 3 times a day  Vitamin D3 2000 U capsule once a day    Allergies[5]    Objective     Intake/Output Summary (Last 24 hours) at 6/2/2025 1506  Last data filed at 6/2/2025 0858  Gross per 24 hour   Intake 600 ml   Output --   Net 600 ml     Invasive Devices       Peripheral Intravenous Line  Duration             Peripheral IV 05/30/25 Right Antecubital 3 days                    Physical Exam  Vitals and nursing note reviewed.   Constitutional:       General: She is not in acute distress.     Appearance: She is well-developed.   HENT:      Head:      Comments: Facial bruises     Right Ear: External ear normal.      Ears:      Comments: [place]     Mouth/Throat:      Mouth: Mucous membranes are moist.      Comments: Poor dentition    Cardiovascular:      Rate and Rhythm: Normal rate and regular rhythm.      Heart sounds: Normal heart sounds. No murmur heard.     No friction rub. No gallop.   Pulmonary:      Effort: Pulmonary effort is normal. No respiratory distress.      Breath sounds: Normal breath sounds. No  wheezing.   Chest:      Chest wall: No tenderness.   Abdominal:      General: Bowel sounds are normal. There is no distension.      Palpations: Abdomen is soft. There is no mass.      Tenderness: There is no abdominal tenderness. There is no guarding or rebound.     Musculoskeletal:         General: No tenderness or deformity. Normal range of motion.      Right lower leg: No edema.      Left lower leg: No edema.     Skin:     General: Skin is dry.      Findings: Bruising present. No erythema or rash.     Neurological:      Mental Status: She is alert and oriented to person, place, and time.     Psychiatric:         Behavior: Behavior normal.         Lab Results:   I have personally reviewed pertinent lab results including the following:  - [facility] CMP    I have personally reviewed the following imaging study reports in PACS:  -      Therapies:   PT: Resource intensity level II  OT: Resource intensity level II    VTE Prophylaxis: VTE covered by:  enoxaparin, Subcutaneous, 40 mg at 06/02/25 0837        Code Status: Level 3 - DNAR and DNI  Advance Directive and Living Will: yes     Power of :  Daughter, Jennifer PAEZ: Yes    Family and Social Support:   No data recorded    Thank you for allowing me to participate in your patients' care. Please do not hesitate to call with any additional questions.  Emperatriz Anne MD          [1]   Past Medical History:  Diagnosis Date    Anxiety     Arthritis     Gastroparesis     History of atherosclerosis     History of breast cancer     History of osteoarthritis     Long term use of drug     Malignant neoplasm of breast (HCC) 04/11/2011    left w/mastectomy; age 85    Osteomalacia    [2]   Past Surgical History:  Procedure Laterality Date    BREAST BIOPSY Left 04/11/2011    u/s core-positive    HYSTERECTOMY  1974    age 49    MASTECTOMY Left 05/04/2011    malignant   [3]   Social History  Tobacco Use   Smoking Status Never   Smokeless Tobacco Never   [4]   Family History  Problem  Relation Name Age of Onset    Coronary artery disease Mother      Coronary artery disease Family      Hyperlipidemia Family      Osteoarthritis Family      Lung cancer Sister  50   [5] No Known Allergies

## 2025-06-02 NOTE — PLAN OF CARE
Problem: PAIN - ADULT  Goal: Verbalizes/displays adequate comfort level or baseline comfort level  Description: Interventions:  - Encourage patient to monitor pain and request assistance  - Assess pain using appropriate pain scale  - Administer analgesics as ordered based on type and severity of pain and evaluate response  - Implement non-pharmacological measures as appropriate and evaluate response  - Consider cultural and social influences on pain and pain management  - Notify physician/advanced practitioner if interventions unsuccessful or patient reports new pain  - Educate patient/family on pain management process including their role and importance of  reporting pain   - Provide non-pharmacologic/complimentary pain relief interventions  Outcome: Progressing     Problem: INFECTION - ADULT  Goal: Absence or prevention of progression during hospitalization  Description: INTERVENTIONS:  - Assess and monitor for signs and symptoms of infection  - Monitor lab/diagnostic results  - Monitor all insertion sites, i.e. indwelling lines, tubes, and drains  - Monitor endotracheal if appropriate and nasal secretions for changes in amount and color  - [place] appropriate cooling/warming therapies per order  - Administer medications as ordered  - Instruct and encourage patient and family to use good hand hygiene technique  - Identify and instruct in appropriate isolation precautions for identified infection/condition  Outcome: Progressing  Goal: Absence of fever/infection during neutropenic period  Description: INTERVENTIONS:  - Monitor WBC  - Perform strict hand hygiene  - Limit to healthy visitors only  - No plants, dried, fresh or silk flowers with mccormick in patient room  Outcome: Progressing     Problem: SAFETY ADULT  Goal: Patient will remain free of falls  Description: INTERVENTIONS:  - Educate patient/family on patient safety including physical limitations  - Instruct patient to call for assistance with activity   -  Consider consulting OT/PT to assist with strengthening/mobility based on AM PAC & JH-HLM score  - Consult OT/PT to assist with strengthening/mobility   - Keep Call bell within reach  - Keep bed low and locked with side rails adjusted as appropriate  - Keep care items and personal belongings within reach  - Initiate and maintain comfort rounds  - Make Fall Risk Sign visible to staff  - Offer Toileting every  Hours, in advance of need  - Initiate/Maintain alarm  - Obtain necessary fall risk management equipment:   - Apply yellow socks and bracelet for high fall risk patients  - Consider moving patient to room near nurses station  Outcome: Progressing  Goal: Maintain or return to baseline ADL function  Description: INTERVENTIONS:  -  Assess patient's ability to carry out ADLs; assess patient's baseline for ADL function and identify physical deficits which impact ability to perform ADLs (bathing, care of mouth/teeth, toileting, grooming, dressing, etc.)  - Assess/evaluate cause of self-care deficits   - Assess range of motion  - Assess patient's mobility; develop plan if impaired  - Assess patient's need for assistive devices and provide as appropriate  - Encourage maximum independence but intervene and supervise when necessary  - Involve family in performance of ADLs  - Assess for home care needs following discharge   - Consider OT consult to assist with ADL evaluation and planning for discharge  - Provide patient education as appropriate  - Monitor functional capacity and physical performance, use of AM PAC & JH-HLM   - Monitor gait, balance and fatigue with ambulation    Outcome: Progressing  Goal: Maintains/Returns to pre admission functional level  Description: INTERVENTIONS:  - Perform AM-PAC 6 Click Basic Mobility/ Daily Activity assessment daily.  - Set and communicate daily mobility goal to care team and patient/family/caregiver.   - Collaborate with rehabilitation services on mobility goals if consulted  -  Perform Range of Motion  times a day.  - Reposition patient every  hours.  - Dangle patient  times a day  - Stand patient  times a day  - Ambulate patient  times a day  - Out of bed to chair  times a day   - Out of bed for meals  times a day  - Out of bed for toileting  - Record patient progress and toleration of activity level   Outcome: Progressing     Problem: DISCHARGE PLANNING  Goal: Discharge to home or other facility with appropriate resources  Description: INTERVENTIONS:  - Identify barriers to discharge w/patient and caregiver  - Arrange for needed discharge resources and transportation as appropriate  - Identify discharge learning needs (meds, wound care, etc.)  - Arrange for interpretive services to assist at discharge as needed  - Refer to Case Management Department for coordinating discharge planning if the patient needs post-hospital services based on physician/advanced practitioner order or complex needs related to functional status, cognitive ability, or social support system  Outcome: Progressing     Problem: Knowledge Deficit  Goal: Patient/family/caregiver demonstrates understanding of disease process, treatment plan, medications, and discharge instructions  Description: Complete learning assessment and assess knowledge base.  Interventions:  - Provide teaching at level of understanding  - Provide teaching via preferred learning methods  Outcome: Progressing     Problem: Prexisting or High Potential for Compromised Skin Integrity  Goal: Skin integrity is maintained or improved  Description: INTERVENTIONS:  - Identify patients at risk for skin breakdown  - Assess and monitor skin integrity including under and around medical devices   - Assess and monitor nutrition and hydration status  - Monitor labs  - Assess for incontinence   - Turn and reposition patient  - Assist with mobility/ambulation  - Relieve pressure over elizabeth prominences   - Avoid friction and shearing  - Provide appropriate hygiene  as needed including keeping skin clean and dry  - Evaluate need for skin moisturizer/barrier cream  - Collaborate with interdisciplinary team  - Patient/family teaching  - Consider wound care consult    Assess:  - Review Calvin scale daily  - Clean and moisturize skin every   - Inspect skin when repositioning, toileting, and assisting with ADLS  - Assess under medical devices such as  every   - Assess extremities for adequate circulation and sensation     Bed Management:  - Have minimal linens on bed & keep smooth, unwrinkled  - Change linens as needed when moist or perspiring  - Avoid sitting or lying in one position for more than  hours while in bed?Keep HOB at degrees   - Toileting:  - Offer bedside commode  - Assess for incontinence every   - Use incontinent care products after each incontinent episode such as     Activity:  - Mobilize patient  times a day  - Encourage activity and walks on unit  - Encourage or provide ROM exercises   - Turn and reposition patient every  Hours  - Use appropriate equipment to lift or move patient in bed  - Instruct/ Assist with weight shifting every when out of bed in chair  - Consider limitation of chair time  hour intervals    Skin Care:  - Avoid use of baby powder, tape, friction and shearing, hot water or constrictive clothing  - Relieve pressure over bony prominences using  - Do not massage red bony areas    Next Steps:  - Teach patient strategies to minimize risks such as   - Consider consults to  interdisciplinary teams such as   Outcome: Progressing

## 2025-06-02 NOTE — ASSESSMENT & PLAN NOTE
-Patient is high risk of delirium due to cognitive impairment at baseline, hospitalization, hearing impairment  - delirium precautions  -maintain normal sleep/wake cycle  -minimize overnight interruptions, group overnight vitals/labs/nursing checks as possible  -dim lights, close blinds and turn off tv to minimize stimulation and encourage sleep environment in evenings  -ensure that pain is well controlled   -monitor for fecal and urinary retention which may precipitate delirium  -encourage early mobilization and ambulation  -provide frequent reorientation and redirection  -encourage family and friends at the bedside to help calm patient if anxious  -avoid medications which may precipitate or worsen delirium such as tramadol, benzodiazepine, anticholinergics, and antihistaminics  -encourage hydration and nutrition , assist with feeding if needed  -redirect unwanted behaviors as first line, avoid physical restraints.   - Of note patient was taking tramadol 50 mg 3 times a day at the facility, avoid use in the future.  Monitor for signs of withdrawal

## 2025-06-03 VITALS
HEART RATE: 81 BPM | SYSTOLIC BLOOD PRESSURE: 128 MMHG | DIASTOLIC BLOOD PRESSURE: 64 MMHG | OXYGEN SATURATION: 94 % | BODY MASS INDEX: 19.91 KG/M2 | WEIGHT: 123.9 LBS | HEIGHT: 66 IN | TEMPERATURE: 99.6 F | RESPIRATION RATE: 18 BRPM

## 2025-06-03 LAB — MRSA NOSE QL CULT: NORMAL

## 2025-06-03 PROCEDURE — NC001 PR NO CHARGE: Performed by: STUDENT IN AN ORGANIZED HEALTH CARE EDUCATION/TRAINING PROGRAM

## 2025-06-03 PROCEDURE — 99232 SBSQ HOSP IP/OBS MODERATE 35: CPT | Performed by: FAMILY MEDICINE

## 2025-06-03 PROCEDURE — 99238 HOSP IP/OBS DSCHRG MGMT 30/<: CPT | Performed by: PHYSICIAN ASSISTANT

## 2025-06-03 RX ADMIN — BACITRACIN 1 SMALL APPLICATION: 500 OINTMENT TOPICAL at 08:55

## 2025-06-03 RX ADMIN — POLYETHYLENE GLYCOL 3350 17 G: 17 POWDER, FOR SOLUTION ORAL at 08:54

## 2025-06-03 RX ADMIN — CARBIDOPA AND LEVODOPA 1 TABLET: 25; 100 TABLET ORAL at 08:55

## 2025-06-03 RX ADMIN — ACETAMINOPHEN 975 MG: 325 TABLET ORAL at 06:49

## 2025-06-03 RX ADMIN — Medication 2000 UNITS: at 08:55

## 2025-06-03 RX ADMIN — AMLODIPINE BESYLATE 2.5 MG: 2.5 TABLET ORAL at 08:55

## 2025-06-03 RX ADMIN — ENOXAPARIN SODIUM 40 MG: 40 INJECTION SUBCUTANEOUS at 08:54

## 2025-06-03 RX ADMIN — ESCITALOPRAM OXALATE 10 MG: 10 TABLET ORAL at 08:55

## 2025-06-03 NOTE — PROGRESS NOTES
Progress Note - Geriatric Medicine   Name: Bri Neol 89 y.o. female I MRN: [id number]  Unit/Bed#: W -01 I Date of Admission: 5/30/2025   Date of Service: 6/3/2025 I Hospital Day: 4     Assessment & Plan  Pneumocephalus  Continue to monitor  Parkinson's disease (HCC)  Continue Sinemet  PT/OT as tolerated  Primary hypertension    Primary osteoarthritis of left shoulder    Fall  Patient uses a walker for ambulation at baseline  She reports recent fall  Associated symptoms -dizziness  Monitor orthostatic vital signs  Encourage p.o. hydration  Avoid hypotension and hypoglycemia   PT/OT as tolerated  Acute pain of left shoulder    Closed fracture of nasal bone  Patient presented to the hospital with bilateral distal nasal bone fractures  She has seen by OMFS.  No surgical intervention recommended.  Continue sinus precautions  Optimize pain regimen    Cognitive impairment  Currently stable, no behaviors noted.  Scored 4/5 on minicog.  She needs assistance with all IADLs at baseline  Will continue to provide supportive care, reorient as needed.  Patient is at high risk for delirium, will monitor closely and place on delirium precautions.  Maintain sleep/wake cycle.  Optimize pain regimen.  Monitor for constipation and urinary retention and manage as needed.  Check B12 level and TSH  Encourage family to visit.  Encourage to wear glasses and hearing aids while awake.  Encourage po intake, assist with feeding if needed.   At risk for delirium    -Patient is high risk of delirium due to cognitive impairment at baseline, hospitalization, hearing impairment  - delirium precautions  -maintain normal sleep/wake cycle  -minimize overnight interruptions, group overnight vitals/labs/nursing checks as possible  -dim lights, close blinds and turn off tv to minimize stimulation and encourage sleep environment in evenings  -ensure that pain is well controlled   -monitor for fecal and urinary retention which may precipitate  "delirium  -encourage early mobilization and ambulation  -provide frequent reorientation and redirection  -encourage family and friends at the bedside to help calm patient if anxious  -avoid medications which may precipitate or worsen delirium such as tramadol, benzodiazepine, anticholinergics, and antihistaminics  -encourage hydration and nutrition , assist with feeding if needed  -redirect unwanted behaviors as first line, avoid physical restraints.   - Of note patient was taking tramadol 50 mg 3 times a day at the facility, avoid use in the future.  Monitor for signs of withdrawal    Subjective:   Patient seen and examined at bedside for geriatric follow-up.  At the time of encounter she denies any acute complaints.  Pain seems to be well-controlled.    Review of Systems   Constitutional:  Positive for appetite change. Negative for chills and fever.   HENT:  Negative for congestion and rhinorrhea.    Respiratory:  Negative for cough, shortness of breath and wheezing.    Cardiovascular:  Negative for chest pain, palpitations and leg swelling.   Gastrointestinal:  Negative for abdominal pain and constipation.   Endocrine: Negative for cold intolerance.   Genitourinary:  Negative for difficulty urinating, dysuria and hematuria.   Musculoskeletal:  Positive for gait problem.   Skin:  Negative for wound.   Allergic/Immunologic: Negative for environmental allergies.   Neurological:  Positive for headaches. Negative for dizziness and seizures.   Hematological:  Does not bruise/bleed easily.   Psychiatric/Behavioral:  Negative for behavioral problems and sleep disturbance.          Objective:     Vitals: Blood pressure 128/64, pulse 81, temperature 99.6 °F (37.6 °C), resp. rate 18, height 5' 6\" (1.676 m), weight 56.2 kg (123 lb 14.4 oz), SpO2 94%, not currently breastfeeding.,Body mass index is 20 kg/m².      Intake/Output Summary (Last 24 hours) at 6/3/2025 1253  Last data filed at 6/3/2025 1253  Gross per 24 hour "   Intake 720 ml   Output 0 ml   Net 720 ml       Current Medications: Reviewed    Physical Exam:   Physical Exam  Vitals and nursing note reviewed.   Constitutional:       General: She is not in acute distress.     Appearance: She is well-developed.   HENT:      Head: Normocephalic.      Comments: Facial bruises and abrassions     Mouth/Throat:      Mouth: Mucous membranes are dry.     Eyes:      Conjunctiva/sclera: Conjunctivae normal.       Cardiovascular:      Rate and Rhythm: Normal rate and regular rhythm.      Heart sounds: No murmur heard.  Pulmonary:      Effort: Pulmonary effort is normal. No respiratory distress.      Breath sounds: Normal breath sounds.   Abdominal:      Palpations: Abdomen is soft.      Tenderness: There is no abdominal tenderness.     Musculoskeletal:         General: No swelling.      Cervical back: Neck supple.      Right lower leg: No edema.      Left lower leg: No edema.     Skin:     General: Skin is warm and dry.      Capillary Refill: Capillary refill takes less than 2 seconds.      Findings: Bruising present.     Neurological:      Mental Status: She is alert and oriented to person, place, and time.     Psychiatric:         Mood and Affect: Mood normal.          Invasive Devices       Peripheral Intravenous Line  Duration             Peripheral IV 05/30/25 Right Antecubital 3 days

## 2025-06-03 NOTE — DISCHARGE SUMMARY
Discharge Summary - Trauma   Name: Bri Noel 89 y.o. female I MRN: [id number]  Unit/Bed#: W -01 I Date of Admission: 5/30/2025   Date of Service: 6/3/2025 I Hospital Day: 4      Fall  Assessment & Plan  - Status post fall with the below noted injuries.  - Fall precautions.  - Geriatric Medicine consultation for evaluation, medication review and recommendations.  - PT and OT evaluation and treatment as indicated.  - Case Management consultation for disposition planning.    * Pneumocephalus  Assessment & Plan  - Patient noted to have scattered pneumocephalus on initial CT imaging without definitive evidence of calvarial fracture.  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  - Appreciate OMS and Neurosurgery consultations and recommendations.   - Non-operative management recommended.   - No further inpatient work up recommended.  - May use ice for pain and swelling of face.  - Analgesia as needed.  - No further workup anticipated at this time.  - PT and OT evaluation and treatment as indicated.  - Case management consulted for disposition planning.  - Outpatient follow up with OMS and Neurosurgery for re-evaluation.  - Patient to have repeat CT scan of the head 2 to 3 days prior to follow-up with Neurosurgery.    Closed fracture of nasal bone  Assessment & Plan  - Patient with acute closed mild bilateral distal nasal bone fractures, present on presentation.  - Appreciate OMS evaluation and recommendations.   - Nonoperative management recommended.  - May use ice for pain and swelling.  - Multimodal analgesia as indicated.  - Maintain sinus precautions.  - Outpatient follow-up with OMS in 1 to 2 weeks for reevaluation.    Acute pain of left shoulder  Assessment & Plan  - Patient with  left shoulder and upper arm pain on presentation in setting of chronic history of left shoulder osteoarthritis.  - X-ray imaging from 5/30/2025 reviewed with no evidence of acute traumatic injury or osseous abnormality; degenerative changes were noted.  - May remain weightbearing as tolerated on the left upper extremity with activity as tolerated.  - Analgesia as needed.  - May use ice for pain and swelling.  - PT and OT evaluation shims indicated.  - Outpatient follow-up with Orthopedic surgery recommended.    Primary osteoarthritis of left shoulder  Assessment & Plan  - Patient with left shoulder and upper arm pain on presentation in setting of chronic history of left shoulder osteoarthritis.  - X-ray imaging from 5/30/2025 reviewed with no evidence of acute traumatic injury or osseous abnormality; degenerative changes were noted.  - May remain weightbearing as tolerated on the left upper extremity with activity as tolerated.  - Analgesia as needed.  - May use ice for pain and swelling.  - PT and OT evaluation shims indicated.  - Outpatient follow-up with Orthopedic surgery recommended.    Parkinson's disease (HCC)  Assessment & Plan  - Patient with chronic history of Parkinson's disease.  - Maintain fall precautions.  - Continue/resume home medication therapy as appropriate.  - PT and OT evaluation and treatment as indicated.  - Outpatient follow-up per routine.    Cognitive impairment  Assessment & Plan  - Maintain delirium precautions.  - Geriatric Medicine consultation for evaluation, medication review and recommendations.     Primary hypertension  Assessment & Plan  - Patient with chronic history of primary hypertension and elevated blood pressures during hospital encounter.  - Continue/resume home medication therapy as appropriate.  - Outpatient follow-up per routine.          Admission Date: 5/30/2025     Discharge Date: 6/3/2025     Admitting Diagnosis: Pneumocephalus [G93.89]  Facial injury  [S09.93XA]  Nasal bone fractures [S02.2XXA]    Discharge Diagnosis: See above.    Attending and Service: Dr. Ullrich, [facility] Surgical Services.    Consulting Physician(s):     Neurosurgery.  OMS.  Geriatric medicine.    Imaging and Procedures Performed:   Orders Placed This Encounter   Procedures    ED ECG Documentation Only       CT orbits/temporal bones/skull base wo contrast  Result Date: 5/31/2025  Impression: 1.  No posttraumatic injury to the either temporal bone. 2.  Minimal residual pneumocephalus, presumably related to injury of the sphenoid sinus wall. Workstation performed: [id number]     CTA head and neck w wo contrast  Result Date: 5/31/2025  Impression: CT Brain: Interval decrease in pneumocephalus. Few residual bubbles remaining around the cribriform plate. Fluid levels in the sphenoid sinuses could be a sentinel finding for bony injury, though there are still no discrete fracture planes evident. No new acute brain parenchymal or extra-axial findings. CT Angiography: Mild senescent atheromatous disease involving the bulbs, cavernous segments of the carotids, and distal left vertebral artery. 50% stenosis at the supraclinoid right ICA appears atheromatous rather than posttraumatic. No acute dissection,  aneurysm, or other acute vascular abnormalities. Workstation performed: [id number]     XR humerus LEFT  Result Date: 5/30/2025  Impression: No acute osseous abnormality. Degenerative changes as described. Computerized Assisted Algorithm (CAA) may have been used to analyze all applicable images. Workstation performed: [id number]     XR shoulder 2+ views LEFT  Result Date: 5/30/2025  Impression: No acute osseous abnormality. Degenerative changes as described. Computerized Assisted Algorithm (CAA) may have been used to analyze all applicable images. Workstation performed: [id number]     XR chest 1 view portable  Result Date: 5/30/2025  Impression: No acute cardiopulmonary disease. Workstation  performed: [id number]     CT facial bones without contrast  Result Date: 5/30/2025  Impression: 1.  Mild bilateral distal nasal bone fractures. 2.  Numerous small scattered locules of pneumocephalus are noted. The etiology of this pneumocephalus is not entirely clear. There is slight irregularity of the posterior wall of the right sphenoid sinus which is a potential source of this gas but a definitive fracture is not identified with certainty. 3.  Large forehead/midline frontal scalp hematoma. The study was marked in EPIC for immediate notification. Workstation performed: [id number]     CT head without contrast  Result Date: 5/30/2025  Impression: 1.  No intracranial hemorrhage. 2.  No definitive calvarial fracture identified although the presence of several small scattered locules of pneumocephalus are concerning for an occult nondisplaced calvarial fracture. Recommend a follow-up head CT in 4 to 6 hours. 3.  Large forehead/midline frontal scalp hematoma. 4.  Small amount of layering blood products within the posterior ethmoid air cells and sphenoid sinuses bilaterally. Irregularity of the distal nasal bones are compatible with fractures. I personally discussed this study with KEYLA EVERETT on 5/30/2025 3:39 PM. Workstation performed: [id number]     CT spine cervical without contrast  Result Date: 5/30/2025  Impression: No cervical spine fracture or traumatic malalignment. The study was marked in EPIC for immediate notification. Workstation performed: [id number]       Hospital Course: Bri Noel is a 89-year-old female who presented to the ER for evaluation from her assisted living facility following a fall in the bathroom.  She believes she may have passed out and landed on her face.  She does complain of pain to her face and has bruising associated with it.  During her initial evaluation by the trauma service, her primary survey was unremarkable.  On secondary survey, she was hypertensive with normal vital  signs otherwise; she did have hemotympanum bilaterally; she had periorbital ecchymosis bilaterally and abrasions over her anterior nose; she had an abrasion/skin tear of the left posterior arm.  Her initial workup included labs and the above and imaging studies.    She was admitted to the trauma service status post fall with possible syncope and multiple injuries including pneumocephalus and facial contusions with nasal bone fractures.  Neurosurgery was consulted and additional imaging was obtained to attempt to identify a source for her pneumocephalus.  With no clear skull fracture identified, he sphenoid sinus injury was believed to be the source of her pneumocephalus which did improve on subsequent imaging and she had no other evidence of TBI.  For her facial bone injuries, OMS was consulted and recommended conservative/nonoperative management.  She did not do well enough with therapy to be appropriate for return to her baseline living environment with higher level of care recommended.  Case management was consulted and the patient was treated for orthostatic hypotension during her stay.  She was deemed stable for discharge on 6/3/2025.  For further details of her hospital encounter, please see her complete medical records.    On discharge, the patient is instructed to follow-up with the patient's primary care provider to review the events of the patient's recent hospitalization and incidental findings. The patient is instructed to follow-up in the Trauma Clinic as needed.  The patient is instructed to follow-up with neurosurgery in approximately 2 weeks with repeat CT scan of the head prior to follow-up.  The patient is instructed to follow-up with OMS in 1 to 2 weeks for reevaluation of her facial injuries.  The patient should follow the provided discharge instructions.    Condition at Discharge: stable     Discharge instructions/Information to patient and family:   See after visit summary for information  provided to patient and family.      Provisions for Follow-Up Care:  See after visit summary for information related to follow-up care and any pertinent home health orders.      Disposition: See After Visit Summary for discharge disposition information.    Planned Readmission: No    Discharge Statement   I spent 30 minutes discharging the patient. This time was spent on the day of discharge. I had direct contact with the patient on the day of discharge. Additional documentation is required if more than 30 minutes were spent on discharge.     Discharge Medications:  See after visit summary for reconciled discharge medications provided to patient and family.      Matthieu Johnson PA-C  6/3/2025  11:29 AM

## 2025-06-03 NOTE — DISCHARGE INSTR - AVS FIRST PAGE
Trauma Discharge Instructions:    Please follow-up as instructed. If you need a follow-up appointment, please call the office when you leave to schedule an appointment.    Activity:  - PT and OT evaluation and treatment as indicated.  - Activity per Rehab recommendations.    Diet:    - You may resume your normal diet.    Medications:  - You should continue your current medication regimen after discharge unless otherwise instructed. Please refer to your discharge medication list for further details.  - Please take the pain medications as directed.  - You may become constipated, especially if taking pain medications. You may take any over the counter stool softeners or laxatives as needed. Examples: Milk of Magnesia, Colace, Senna.    Additional Instructions:  - Sinus Precautions:   - Maintain for 4 weeks  - No nose blowing and try to sneeze with mouth open.  - Avoid putting pressure on sinus area  - Avoid strenuous activity/straining.  - Use over-the-counter Afrin twice daily 2 sprays/nostril 3 days maximum as needed, over-the-counter decongestant (e.g. Sudafed) or Antihistamine (e.g. Claritin-D) as needed, and saline nasal spray as needed.  - May shower daily.  - If you have any questions or concerns after discharge please call the office.  - Please call central scheduling at [phone] to schedule a repeat CT scan of the head in approximately 2 weeks.  CT scan should be completed prior to follow-up with Neurosurgery.  - Call office or return to ER if you develop any new or worsening pain, new or worsening headaches, new visual changes, new or worsening lightheadedness/dizziness, fever greater than 101, chills, persistent nausea/vomiting, develop productive cough, increasing shortness of breath and/or difficulty breathing.

## 2025-06-03 NOTE — CASE MANAGEMENT
Case Management Discharge Planning Note    Patient name Bri Noel  Location W /W -01 MRN [id number]  : 1925 Date 6/3/2025       Current Admission Date: 2025  Current Admission Diagnosis:Pneumocephalus   Patient Active Problem List    Diagnosis Date Noted    Cognitive impairment 2025    At risk for delirium 2025    Fall 2025    Acute pain of left shoulder 2025    Closed fracture of nasal bone 2025    Pneumocephalus 2025    History of 2019 novel coronavirus disease (COVID-19) 2023    Primary osteoarthritis of left shoulder 2023    Left leg swelling 2022    Other chest pain 2021    REM behavioral disorder 2021    Depression with anxiety 2018    Lumbar radiculopathy 2018    Tremor 2018    Primary hypertension 2018    Parkinson's disease (HCC) 2017    Hoarseness 2017    Esophageal reflux 2016    Vitamin D deficiency 2015    Breast cancer (HCC) 2015    Anemia 10/21/2014    Nontoxic single thyroid nodule 2013    Arthritis 12/10/2012    Atherosclerosis 12/10/2012    Gastroparesis 12/10/2012    Hyperlipidemia 2012    Osteoporosis 2012      LOS (days): 4  Geometric Mean LOS (GMLOS) (days): 2.2  Days to GMLOS:-1.6     OBJECTIVE:  Risk of Unplanned Readmission Score: 14.87         Current admission status: Inpatient   Preferred Pharmacy:   [id number] - [place] - [address] [place] [address]  Phone: [phone] Fax: [phone]    [facility] [place] - [place] - [address] [place] [address]  Phone: [phone] Fax: [phone]    [facility] - [facility] [place] - [address] [facility]  [address] [facility] [place] [address]  Phone: [phone] Fax: [phone]    Primary Care Provider: Sanchez Salazar  DO    Primary Insurance: MEDICARE  Secondary Insurance: BLUE CROSS    DISCHARGE DETAILS:    Discharge planning discussed with:: daughter/facility  [place] of Choice: Yes  Comments - Freedom of Choice: Pt is stable for DC and will be able to go to B for rehab.  Transportation is not needed as pt's daughter will be transporting her approximately 1300.     Contacts  Patient Contacts: Daughter Jennifer  Relationship to Patient:: Family  Contact Method: Phone  Reason/Outcome: Discharge Planning, Emergency Contact, Continuity of Care    Would you like to participate in our [facility] service program?  : No - Declined    Treatment Team Recommendation: Short Term Rehab  Discharge Destination Plan:: Short Term Rehab  Transport at Discharge : Family   Accepting Facility Name, City & State : [facility]  Receiving Facility/Agency Phone Number: [phone]  Facility/Agency Fax Number: [phone]

## 2025-06-03 NOTE — ASSESSMENT & PLAN NOTE
- Maintain delirium  - Geriatric Medicine consultation for evaluation, medication review and recommendations. precautions.

## 2025-06-03 NOTE — ASSESSMENT & PLAN NOTE
- Patient with acute closed mild bilateral distal nasal bone fractures, present on presentation.  - Appreciate OMS evaluation and recommendations.   - Nonoperative management recommended.  - May use ice for pain and swelling.  - Multimodal analgesia as indicated.  - Maintain sinus precautions.  - Outpatient follow-up with OMS in 1 to 2 weeks for reevaluation.

## 2025-06-03 NOTE — ASSESSMENT & PLAN NOTE
- Patient noted to have scattered pneumocephalus on initial CT imaging without definitive evidence of calvarial fracture.  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  - Appreciate OMS and Neurosurgery consultations and recommendations.   - Non-operative management recommended.   - No further inpatient work up recommended.  - May use ice for pain and swelling of face.  - Analgesia as needed.  - No further workup anticipated at this time.  - PT and OT evaluation and treatment as indicated.  - Case management consulted for disposition planning.  - Outpatient follow up with OMS and Neurosurgery for re-evaluation.

## 2025-06-03 NOTE — PROGRESS NOTES
Progress Note - Trauma   Name: Bri Noel 89 y.o. female I MRN: [id number]  Unit/Bed#: W -01 I Date of Admission: 5/30/2025   Date of Service: 6/3/2025 I Hospital Day: 4    Assessment & Plan  Fall  - Status post fall with the below noted injuries.  - Fall precautions.  - Geriatric Medicine consultation for evaluation, medication review and recommendations.  - PT and OT evaluation and treatment as indicated.  - Case Management consultation for disposition planning.  Pneumocephalus  - Patient noted to have scattered pneumocephalus on initial CT imaging without definitive evidence of calvarial fracture.  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  - Appreciate OMS and Neurosurgery consultations and recommendations.   - Non-operative management recommended.   - No further inpatient work up recommended.  - May use ice for pain and swelling of face.  - Analgesia as needed.  - No further workup anticipated at this time.  - PT and OT evaluation and treatment as indicated.  - Case management consulted for disposition planning.  - Outpatient follow up with OMS and Neurosurgery for re-evaluation.  - Patient to have repeat CT scan of the head 2 to 3 days prior to follow-up with Neurosurgery.  Closed fracture of nasal bone  - Patient with acute closed mild bilateral distal nasal bone fractures, present on presentation.  - Appreciate OMS evaluation and recommendations.   - Nonoperative management recommended.  - May use ice for pain and swelling.  - Multimodal analgesia as indicated.  - Maintain sinus precautions.  - Outpatient follow-up with OMS in 1 to 2 weeks for reevaluation.  Acute pain of left shoulder  Primary osteoarthritis of left shoulder  - Patient with left shoulder and upper arm  pain on presentation in setting of chronic history of left shoulder osteoarthritis.  - X-ray imaging from 5/30/2025 reviewed with no evidence of acute traumatic injury or osseous abnormality; degenerative changes were noted.  - May remain weightbearing as tolerated on the left upper extremity with activity as tolerated.  - Analgesia as needed.  - May use ice for pain and swelling.  - PT and OT evaluation shims indicated.  - Outpatient follow-up with Orthopedic surgery recommended.  Parkinson's disease (HCC)  - Patient with chronic history of Parkinson's disease.  - Maintain fall precautions.  - Continue/resume home medication therapy as appropriate.  - PT and OT evaluation and treatment as indicated.  - Outpatient follow-up per routine.  Primary hypertension  - Patient with chronic history of primary hypertension and elevated blood pressures during hospital encounter.  - Continue/resume home medication therapy as appropriate.  - Outpatient follow-up per routine.  Cognitive impairment  - Maintain delirium precautions.  - Geriatric Medicine consultation for evaluation, medication review and recommendations.     Bowel Regimen: On MiraLAX and Senokot-S.  VTE Prophylaxis:VTE covered by:  enoxaparin, Subcutaneous, 40 mg at 06/02/25 0837        Disposition: Continue current level of care.  Continue therapy evaluation shims indicated.  Anticipate likely discharge today to postacute care facility for rehab; case management assisting with disposition planning.  Please contact the SecureChat role for the Trauma service with any questions/concerns.    24 Hour Events : Patient did have mild symptoms with positive orthostatic vital signs yesterday and was administered fluid resuscitation.  Subjective : Patient reports she was feeling tired as she did not sleep well overnight.  She did not have any specific complaints or issues that kept her up, she just notes that she was not able to find a comfortable position to sleep well.   "She overall has minimal pain this morning and is not having any further dizziness.  She tolerated oral intake yesterday without nausea or vomiting.    Objective :  Temp:  [97.2 °F (36.2 °C)-98.9 °F (37.2 °C)] 97.9 °F (36.6 °C)  HR:  [71-92] 77  BP: (101-159)/(54-80) 155/77  Resp:  [16] 16  SpO2:  [90 %-95 %] 95 %  O2 Device: None (Room air)    I/O         06/01 0701  06/02 0700 06/02 0701  06/03 0700 06/03 0701  06/04 0700    P.O. 600 360     I.V. (mL/kg)       Total Intake(mL/kg) 600 (10.7) 360 (6.4)     Urine (mL/kg/hr)  0 (0)     Total Output  0     Net +600 +360            Unmeasured Urine Occurrence 2 x 1 x     Unmeasured Stool Occurrence 1 x 1 x             Physical Exam   GENERAL APPEARANCE: Patient in no acute distress.  HEENT: NC, facial ecchymosis continues to evolve and resolve with no further swelling and only minimal tenderness by her nose; EOMs intact; Mucous membranes moist  CV: Regular rate and rhythm; no murmur/gallops/rubs appreciated.  CHEST / LUNGS: Clear to auscultation; no wheezes/rales/rhonci.  ABD: NABS; soft; non-distended; non-tender.  : Voiding spontaneously.  EXT: +2 pulses bilaterally upper & lower extremities; no edema.  NEURO: GCS 15; no focal neurologic deficits; neurovascularly intact.  SKIN: Warm, dry and well perfused; no rash; no jaundice.  Facial ecchymosis is noted.         Lab Results: I have reviewed the following results:  No results for input(s): \"WBC\", \"HGB\", \"HCT\", \"PLT\", \"BANDSPCT\", \"SODIUM\", \"K\", \"CL\", \"CO2\", \"BUN\", \"CREATININE\", \"GLUC\", \"CAIONIZED\", \"MG\", \"PHOS\", \"AST\", \"ALT\", \"ALB\", \"TBILI\", \"DBILI\", \"ALKPHOS\", \"PTT\", \"INR\", \"HSTNI0\", \"HSTNI2\", \"BNP\", \"LACTICACID\" in the last 72 hours.    Imaging Results Review: I reviewed radiology reports from this admission including: CT head, CT C-spine, CT facial bones, CT angiogram of the head and neck, CT of the temporal bones, and xray(s).  Other Study Results Review: No additional pertinent studies reviewed.  "

## 2025-06-03 NOTE — ASSESSMENT & PLAN NOTE
- Maintain delirium precautions.  - Geriatric Medicine consultation for evaluation, medication review and recommendations.

## 2025-06-03 NOTE — PLAN OF CARE
Problem: PAIN - ADULT  Goal: Verbalizes/displays adequate comfort level or baseline comfort level  Description: Interventions:  - Encourage patient to monitor pain and request assistance  - Assess pain using appropriate pain scale  - Administer analgesics as ordered based on type and severity of pain and evaluate response  - Implement non-pharmacological measures as appropriate and evaluate response  - Consider cultural and social influences on pain and pain management  - Notify physician/advanced practitioner if interventions unsuccessful or patient reports new pain  - Educate patient/family on pain management process including their role and importance of  reporting pain   - Provide non-pharmacologic/complimentary pain relief interventions  Outcome: Progressing     Problem: INFECTION - ADULT  Goal: Absence or prevention of progression during hospitalization  Description: INTERVENTIONS:  - Assess and monitor for signs and symptoms of infection  - Monitor lab/diagnostic results  - Monitor all insertion sites, i.e. indwelling lines, tubes, and drains  - Monitor endotracheal if appropriate and nasal secretions for changes in amount and color  - [place] appropriate cooling/warming therapies per order  - Administer medications as ordered  - Instruct and encourage patient and family to use good hand hygiene technique  - Identify and instruct in appropriate isolation precautions for identified infection/condition  Outcome: Progressing  Goal: Absence of fever/infection during neutropenic period  Description: INTERVENTIONS:  - Monitor WBC  - Perform strict hand hygiene  - Limit to healthy visitors only  - No plants, dried, fresh or silk flowers with mccormick in patient room  Outcome: Progressing     Problem: SAFETY ADULT  Goal: Patient will remain free of falls  Description: INTERVENTIONS:  - Educate patient/family on patient safety including physical limitations  - Instruct patient to call for assistance with activity   -  Consider consulting OT/PT to assist with strengthening/mobility based on AM PAC & JH-HLM score  - Consult OT/PT to assist with strengthening/mobility   - Keep Call bell within reach  - Keep bed low and locked with side rails adjusted as appropriate  - Keep care items and personal belongings within reach  - Initiate and maintain comfort rounds  - Make Fall Risk Sign visible to staff  - Offer Toileting every  Hours, in advance of need  - Initiate/Maintain alarm  - Obtain necessary fall risk management equipment:   - Apply yellow socks and bracelet for high fall risk patients  - Consider moving patient to room near nurses station  Outcome: Progressing  Goal: Maintain or return to baseline ADL function  Description: INTERVENTIONS:  -  Assess patient's ability to carry out ADLs; assess patient's baseline for ADL function and identify physical deficits which impact ability to perform ADLs (bathing, care of mouth/teeth, toileting, grooming, dressing, etc.)  - Assess/evaluate cause of self-care deficits   - Assess range of motion  - Assess patient's mobility; develop plan if impaired  - Assess patient's need for assistive devices and provide as appropriate  - Encourage maximum independence but intervene and supervise when necessary  - Involve family in performance of ADLs  - Assess for home care needs following discharge   - Consider OT consult to assist with ADL evaluation and planning for discharge  - Provide patient education as appropriate  - Monitor functional capacity and physical performance, use of AM PAC & JH-HLM   - Monitor gait, balance and fatigue with ambulation    Outcome: Progressing  Goal: Maintains/Returns to pre admission functional level  Description: INTERVENTIONS:  - Perform AM-PAC 6 Click Basic Mobility/ Daily Activity assessment daily.  - Set and communicate daily mobility goal to care team and patient/family/caregiver.   - Collaborate with rehabilitation services on mobility goals if consulted  -  Perform Range of Motion  times a day.  - Reposition patient every  hours.  - Dangle patient  times a day  - Stand patient  times a day  - Ambulate patient  times a day  - Out of bed to chair  times a day   - Out of bed for meals  times a day  - Out of bed for toileting  - Record patient progress and toleration of activity level   Outcome: Progressing     Problem: DISCHARGE PLANNING  Goal: Discharge to home or other facility with appropriate resources  Description: INTERVENTIONS:  - Identify barriers to discharge w/patient and caregiver  - Arrange for needed discharge resources and transportation as appropriate  - Identify discharge learning needs (meds, wound care, etc.)  - Arrange for interpretive services to assist at discharge as needed  - Refer to Case Management Department for coordinating discharge planning if the patient needs post-hospital services based on physician/advanced practitioner order or complex needs related to functional status, cognitive ability, or social support system  Outcome: Progressing     Problem: Knowledge Deficit  Goal: Patient/family/caregiver demonstrates understanding of disease process, treatment plan, medications, and discharge instructions  Description: Complete learning assessment and assess knowledge base.  Interventions:  - Provide teaching at level of understanding  - Provide teaching via preferred learning methods  Outcome: Progressing     Problem: Prexisting or High Potential for Compromised Skin Integrity  Goal: Skin integrity is maintained or improved  Description: INTERVENTIONS:  - Identify patients at risk for skin breakdown  - Assess and monitor skin integrity including under and around medical devices   - Assess and monitor nutrition and hydration status  - Monitor labs  - Assess for incontinence   - Turn and reposition patient  - Assist with mobility/ambulation  - Relieve pressure over elizabeth prominences   - Avoid friction and shearing  - Provide appropriate hygiene  as needed including keeping skin clean and dry  - Evaluate need for skin moisturizer/barrier cream  - Collaborate with interdisciplinary team  - Patient/family teaching  - Consider wound care consult    Assess:  - Review Calvin scale daily  - Clean and moisturize skin every   - Inspect skin when repositioning, toileting, and assisting with ADLS  - Assess under medical devices such as  every   - Assess extremities for adequate circulation and sensation     Bed Management:  - Have minimal linens on bed & keep smooth, unwrinkled  - Change linens as needed when moist or perspiring  - Avoid sitting or lying in one position for more than hours while in bed?Keep HOB at degrees   - Toileting:  - Offer bedside commode  - Assess for incontinence every   - Use incontinent care products after each incontinent episode such as     Activity:  - Mobilize patient  times a day  - Encourage activity and walks on unit  - Encourage or provide ROM exercises   - Turn and reposition patient every  Hours  - Use appropriate equipment to lift or move patient in bed  - Instruct/ Assist with weight shifting every  when out of bed in chair  - Consider limitation of chair time  hour intervals    Skin Care:  - Avoid use of baby powder, tape, friction and shearing, hot water or constrictive clothing  - Relieve pressure over bony prominences using   - Do not massage red bony areas    Next Steps:  - Teach patient strategies to minimize risks such as   - Consider consults to  interdisciplinary teams such as   Outcome: Progressing

## 2025-06-03 NOTE — ASSESSMENT & PLAN NOTE
- Patient noted to have scattered pneumocephalus on initial CT imaging without definitive evidence of calvarial fracture.  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  - Appreciate OMS and Neurosurgery consultations and recommendations.   - Non-operative management recommended.   - No further inpatient work up recommended.  - May use ice for pain and swelling of face.  - Analgesia as needed.  - No further workup anticipated at this time.  - PT and OT evaluation and treatment as indicated.  - Case management consulted for disposition planning.  - Outpatient follow up with OMS and Neurosurgery for re-evaluation.  - Patient to have repeat CT scan of the head 2 to 3 days prior to follow-up with Neurosurgery.

## 2025-06-03 NOTE — PLAN OF CARE
Problem: PAIN - ADULT  Goal: Verbalizes/displays adequate comfort level or baseline comfort level  Description: Interventions:  - Encourage patient to monitor pain and request assistance  - Assess pain using appropriate pain scale  - Administer analgesics as ordered based on type and severity of pain and evaluate response  - Implement non-pharmacological measures as appropriate and evaluate response  - Consider cultural and social influences on pain and pain management  - Notify physician/advanced practitioner if interventions unsuccessful or patient reports new pain  - Educate patient/family on pain management process including their role and importance of  reporting pain   - Provide non-pharmacologic/complimentary pain relief interventions  Outcome: Progressing     Problem: INFECTION - ADULT  Goal: Absence or prevention of progression during hospitalization  Description: INTERVENTIONS:  - Assess and monitor for signs and symptoms of infection  - Monitor lab/diagnostic results  - Monitor all insertion sites, i.e. indwelling lines, tubes, and drains  - Monitor endotracheal if appropriate and nasal secretions for changes in amount and color  - [place] appropriate cooling/warming therapies per order  - Administer medications as ordered  - Instruct and encourage patient and family to use good hand hygiene technique  - Identify and instruct in appropriate isolation precautions for identified infection/condition  Outcome: Progressing  Goal: Absence of fever/infection during neutropenic period  Description: INTERVENTIONS:  - Monitor WBC  - Perform strict hand hygiene  - Limit to healthy visitors only  - No plants, dried, fresh or silk flowers with mccormick in patient room  Outcome: Progressing     Problem: SAFETY ADULT  Goal: Patient will remain free of falls  Description: INTERVENTIONS:  - Educate patient/family on patient safety including physical limitations  - Instruct patient to call for assistance with activity   -  Consider consulting OT/PT to assist with strengthening/mobility based on AM PAC & JH-HLM score  - Consult OT/PT to assist with strengthening/mobility   - Keep Call bell within reach  - Keep bed low and locked with side rails adjusted as appropriate  - Keep care items and personal belongings within reach  - Initiate and maintain comfort rounds  - Make Fall Risk Sign visible to staff  - Offer Toileting every  Hours, in advance of need  - Initiate/Maintain alarm  - Obtain necessary fall risk management equipment:   - Apply yellow socks and bracelet for high fall risk patients  - Consider moving patient to room near nurses station  Outcome: Progressing  Goal: Maintain or return to baseline ADL function  Description: INTERVENTIONS:  -  Assess patient's ability to carry out ADLs; assess patient's baseline for ADL function and identify physical deficits which impact ability to perform ADLs (bathing, care of mouth/teeth, toileting, grooming, dressing, etc.)  - Assess/evaluate cause of self-care deficits   - Assess range of motion  - Assess patient's mobility; develop plan if impaired  - Assess patient's need for assistive devices and provide as appropriate  - Encourage maximum independence but intervene and supervise when necessary  - Involve family in performance of ADLs  - Assess for home care needs following discharge   - Consider OT consult to assist with ADL evaluation and planning for discharge  - Provide patient education as appropriate  - Monitor functional capacity and physical performance, use of AM PAC & JH-HLM   - Monitor gait, balance and fatigue with ambulation    Outcome: Progressing  Goal: Maintains/Returns to pre admission functional level  Description: INTERVENTIONS:  - Perform AM-PAC 6 Click Basic Mobility/ Daily Activity assessment daily.  - Set and communicate daily mobility goal to care team and patient/family/caregiver.   - Collaborate with rehabilitation services on mobility goals if consulted  -  Perform Range of Motion  times a day.  - Reposition patient every  hours.  - Dangle patient times a day  - Stand patient  times a day  - Ambulate patient  times a day  - Out of bed to chair times a day   - Out of bed for meals  times a day  - Out of bed for toileting  - Record patient progress and toleration of activity level   Outcome: Progressing     Problem: DISCHARGE PLANNING  Goal: Discharge to home or other facility with appropriate resources  Description: INTERVENTIONS:  - Identify barriers to discharge w/patient and caregiver  - Arrange for needed discharge resources and transportation as appropriate  - Identify discharge learning needs (meds, wound care, etc.)  - Arrange for interpretive services to assist at discharge as needed  - Refer to Case Management Department for coordinating discharge planning if the patient needs post-hospital services based on physician/advanced practitioner order or complex needs related to functional status, cognitive ability, or social support system  Outcome: Progressing     Problem: Knowledge Deficit  Goal: Patient/family/caregiver demonstrates understanding of disease process, treatment plan, medications, and discharge instructions  Description: Complete learning assessment and assess knowledge base.  Interventions:  - Provide teaching at level of understanding  - Provide teaching via preferred learning methods  Outcome: Progressing     Problem: Prexisting or High Potential for Compromised Skin Integrity  Goal: Skin integrity is maintained or improved  Description: INTERVENTIONS:  - Identify patients at risk for skin breakdown  - Assess and monitor skin integrity including under and around medical devices   - Assess and monitor nutrition and hydration status  - Monitor labs  - Assess for incontinence   - Turn and reposition patient  - Assist with mobility/ambulation  - Relieve pressure over elizabeth prominences   - Avoid friction and shearing  - Provide appropriate hygiene as  needed including keeping skin clean and dry  - Evaluate need for skin moisturizer/barrier cream  - Collaborate with interdisciplinary team  - Patient/family teaching  - Consider wound care consult    Assess:  - Review Calvin scale daily  - Clean and moisturize skin every   - Inspect skin when repositioning, toileting, and assisting with ADLS  - Assess under medical devices such as  every   - Assess extremities for adequate circulation and sensation     Bed Management:  - Have minimal linens on bed & keep smooth, unwrinkled  - Change linens as needed when moist or perspiring  - Avoid sitting or lying in one position for more than  hours while in bed?Keep HOB at degrees   - Toileting:  - Offer bedside commode  - Assess for incontinence every  - Use incontinent care products after each incontinent episode such as     Activity:  - Mobilize patient  times a day  - Encourage activity and walks on unit  - Encourage or provide ROM exercises   - Turn and reposition patient every  Hours  - Use appropriate equipment to lift or move patient in bed  - Instruct/ Assist with weight shifting every  when out of bed in chair  - Consider limitation of chair time  hour intervals    Skin Care:  - Avoid use of baby powder, tape, friction and shearing, hot water or constrictive clothing  - Relieve pressure over bony prominences using  - Do not massage red bony areas    Next Steps:  - Teach patient strategies to minimize risks such as   - Consider consults to  interdisciplinary teams such as  Outcome: Progressing

## 2025-06-03 NOTE — PROGRESS NOTES
Progress Note - Trauma   Name: Bri Noel 89 y.o. female I MRN: [id number]  Unit/Bed#: W -01 I Date of Admission: 5/30/2025   Date of Service: 6/2/2025 I Hospital Day: 3    Assessment & Plan  Fall  - Status post fall with the below noted injuries.  - Fall precautions.  - Geriatric Medicine consultation for evaluation, medication review and recommendations.  - PT and OT evaluation and treatment as indicated.  - Case Management consultation for disposition planning.  Pneumocephalus  - Patient noted to have scattered pneumocephalus on initial CT imaging without definitive evidence of calvarial fracture.  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  - Appreciate OMS and Neurosurgery consultations and recommendations.   - Non-operative management recommended.   - No further inpatient work up recommended.  - May use ice for pain and swelling of face.  - Analgesia as needed.  - No further workup anticipated at this time.  - PT and OT evaluation and treatment as indicated.  - Case management consulted for disposition planning.  - Outpatient follow up with OMS and Neurosurgery for re-evaluation.  Closed fracture of nasal bone  - Patient with acute closed mild bilateral distal nasal bone fractures, present on presentation.  - Await OMS evaluation and recommendations.  - May use ice for pain and swelling.  - Multimodal analgesia as indicated.  - Maintain sinus precautions.  Acute pain of left shoulder  Primary osteoarthritis of left shoulder  - Patient with left shoulder and upper arm pain on presentation in setting of chronic history of left shoulder osteoarthritis.  - X-ray imaging from 5/30/2025 reviewed with no evidence of acute traumatic injury or osseous abnormality; degenerative changes  "were noted.  - May remain weightbearing as tolerated on the left upper extremity with activity as tolerated.  - Analgesia as needed.  - May use ice for pain and swelling.  - PT and OT evaluation shims indicated.  - Outpatient follow-up with Orthopedic surgery recommended.  Parkinson's disease (HCC)  - Patient with chronic history of Parkinson's disease.  - Maintain fall precautions.  - Continue/resume home medication therapy as appropriate.  - PT and OT evaluation and treatment as indicated.  - Outpatient follow-up per routine.  Primary hypertension  - Patient with chronic history of primary hypertension and elevated blood pressures during hospital encounter.  - Continue/resume home medication therapy as appropriate.  - Outpatient follow-up per routine.  Cognitive impairment  - Maintain delirium  - Geriatric Medicine consultation for evaluation, medication review and recommendations. precautions.    Bowel Regimen: On Miralax and Senokot S.  VTE Prophylaxis:VTE covered by:  enoxaparin, Subcutaneous, 40 mg at 06/02/25 0837        Disposition: Continue current level of care. Continue therapy evaluation and treatment as indicated. Case management following for disposition planning. Please contact the [employer] role for the Trauma service with any questions/concerns.    24 Hour Events : No significant events overnight.  Subjective : Patient states she feels \"crappy\" today. She does not offer specific complaints, but overall feels \"washed out.\" She notes her pain is pretty well controlled and she is tolerating oral intake. She is nervous about falling again.    Objective :  Temp:  [96.7 °F (35.9 °C)-98.9 °F (37.2 °C)] 98.3 °F (36.8 °C)  HR:  [71-92] 91  BP: (101-159)/(54-80) 131/76  Resp:  [16-20] 16  SpO2:  [90 %-95 %] 90 %  O2 Device: None (Room air)    I/O         06/01 0701  06/02 0700 06/02 0701  06/03 0700    P.O. 600 360    I.V. (mL/kg)      Total Intake(mL/kg) 600 (10.7) 360 (6.4)    Urine (mL/kg/hr)  0 (0)    " "Total Output  0    Net +600 +360          Unmeasured Urine Occurrence 2 x 1 x    Unmeasured Stool Occurrence 1 x 1 x            Physical Exam   GENERAL APPEARANCE: Patient in no acute distress.  HEENT: NC, facial swelling resolved, ecchymosis is evolving and there is only minimal tenderness; EOMs intact; Mucous membranes moist  CV: Regular rate and rhythm; no murmur/gallops/rubs appreciated.  CHEST / LUNGS: Clear to auscultation; no wheezes/rales/rhonci.  ABD: NABS; soft; non-distended; non-tender.  : Voiding spontaneously.  EXT: +2 pulses bilaterally upper & lower extremities; no edema.  NEURO: GCS 15; no focal neurologic deficits; neurovascularly intact.  SKIN: Warm, dry and well perfused; no rash; no jaundice. Facial ecchymosis as noted.         Lab Results: I have reviewed the following results:  No results for input(s): \"WBC\", \"HGB\", \"HCT\", \"PLT\", \"BANDSPCT\", \"SODIUM\", \"K\", \"CL\", \"CO2\", \"BUN\", \"CREATININE\", \"GLUC\", \"CAIONIZED\", \"MG\", \"PHOS\", \"AST\", \"ALT\", \"ALB\", \"TBILI\", \"DBILI\", \"ALKPHOS\", \"PTT\", \"INR\", \"HSTNI0\", \"HSTNI2\", \"BNP\", \"LACTICACID\" in the last 72 hours.    Imaging Results Review: I reviewed radiology reports from this admission including: CT head, CT C-spine, CT Facial bones, CTA head & neck and CT Temporal bones, and xray(s).  Other Study Results Review: No additional pertinent studies reviewed.  "

## 2025-06-03 NOTE — INCIDENTAL FINDINGS
The following findings require follow up:  Radiographic finding     Findings and Follow up required:     1) Similar enlarged and heterogeneous appearance of the right thyroid lobe with an approximate 1.1 cm right thyroid nodule were incidentally identified on your trauma imaging. A malignancy (cancer) cannot be completely excluded based on trauma imaging alone.  Recommend short-term outpatient follow-up with primary care provider to review the finding and for further surveillance as indicated.  Incidental discovery of one or more thyroid nodule(s) measuring more than 1.5 cm and without suspicious features is noted in this patient who is above 35 years old; according to guidelines published in the February 2015 white paper on incidental thyroid nodules in the Journal of the American College of Radiology (JACR), further characterization with thyroid ultrasound is recommended.      Follow up should be done within 2 week(s)    Please notify the following clinician to assist with the follow up:   Dr. Sanchez Salazar    Incidental finding results were discussed with the Patient and Patient's POA by Matthieu Johnson PA-C on 06/03/25.   They expressed understanding and all questions answered.

## 2025-06-04 ENCOUNTER — NURSING HOME VISIT (OUTPATIENT)
Dept: GERIATRICS | Facility: OTHER | Age: OVER 89
End: 2025-06-04
Payer: MEDICARE

## 2025-06-04 DIAGNOSIS — S02.2XXA CLOSED FRACTURE OF NASAL BONE, INITIAL ENCOUNTER: ICD-10-CM

## 2025-06-04 DIAGNOSIS — G20.B1 PARKINSON'S DISEASE WITH DYSKINESIA WITHOUT FLUCTUATING MANIFESTATIONS (HCC): Chronic | ICD-10-CM

## 2025-06-04 DIAGNOSIS — D64.9 ANEMIA, UNSPECIFIED TYPE: Chronic | ICD-10-CM

## 2025-06-04 DIAGNOSIS — E55.9 VITAMIN D DEFICIENCY: ICD-10-CM

## 2025-06-04 DIAGNOSIS — F41.8 DEPRESSION WITH ANXIETY: Chronic | ICD-10-CM

## 2025-06-04 DIAGNOSIS — R41.89 COGNITIVE IMPAIRMENT: ICD-10-CM
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
## 2025-06-04 DIAGNOSIS — I10 PRIMARY HYPERTENSION: Primary | ICD-10-CM

## 2025-06-04 PROCEDURE — 99305 1ST NF CARE MODERATE MDM 35: CPT | Performed by: INTERNAL MEDICINE

## 2025-06-05 NOTE — ASSESSMENT & PLAN NOTE
Patient oriented to time and place  Hx of parkinson disease  Minimize use of sedating medication including antipsychotics and antihistamines  Maintain sleep wake cycle and reorient frequently

## 2025-06-05 NOTE — PROGRESS NOTES
[facility]  [address]  [place] [address]  [id number]    Nursing Home Admission    NAME: Bri Noel  AGE: 89 y.o. SEX: female [phone]      Patient Location     [facility]    Patient’s care was coordinated with nursing facility staff. Recent vitals, labs and updated medications were [facility]. Past Medical, surgical, social, medication and allergy history and patient’s previous records reviewed.       Assessment/Plan:    Primary hypertension  Maintained on amlodipine 2.5 mg daily.  Continue     Parkinson's disease (HCC)  Maintained on carbidopa-levodopa  mg TID.  Continue  Continue PT/OT for recent falls    Depression with anxiety  Maintained on lexapro 5 mg bid.  Continue.    Cognitive impairment  Patient oriented to time and place  Hx of parkinson disease  Minimize use of sedating medication including antipsychotics and antihistamines  Maintain sleep wake cycle and reorient frequently    Vitamin D deficiency  Continue vitamin D3 2000 units daily     Fall  Patient admitted to [facility] on 5/30 after sustaining mechanical fall in [place]  Sustained closed fracture of nasal bone and developed acute left shoulder pain  Large ecchymosis on face noted.  No FND on exam  Patient denies significant pain which is well managed on tylenol 325 mg every 8 hours   Patient discharged to post acute rehab for Physical and Occupational therapy    Continue PT/OT  Fall precautions            Chief Complaint      Fall    HPI       Patient is a 89 y.o. female with PMHX of HTN, Parkinson Disease, multiple previous falls, and mild cognitive impairment.  Patient was admitted to [facility] under trauma service on 5/30 after sustaining fall at [facility]. Injuries included pneumocephalus and facial contusions with fracture of nasal bones.  Neurosurgery evaluated the patient who believed source of pneumocephalus was sphenoid sinus injury.   Subsequent imaging showed improvement of this injury without evidence of TBI.  OMS did not feel the nasal bone fractures warranted surgery and recommended conservative management.  Patient did not return to her baseline physical strength while hospitalized and rehab was recommended.       Past Medical History[1]    Past Surgical History[2]    Tobacco Use History[3]     Family History[4]     Allergies[5]     Current Medications[6]    Updated list was reviewed in Levine, Susan. \[facility] and Outlook.\"" system of facility.     There were no vitals filed for this visit.    Vital signs were reviewed in [facility]  /75 Temp 97.6 Pulse 76 Resp 19 O2 96%    Review of Systems   Constitutional:  Negative for chills and fever.   HENT:  Negative for ear pain and sore throat.    Eyes:  Negative for pain and visual disturbance.   Respiratory:  Negative for cough and shortness of breath.    Cardiovascular:  Negative for chest pain and palpitations.   Gastrointestinal:  Negative for abdominal pain and vomiting.   Genitourinary:  Negative for dysuria and hematuria.   Musculoskeletal:  Negative for arthralgias and back pain.   Skin:  Negative for color change and rash.   Neurological:  Negative for seizures and syncope.   All other systems reviewed and are negative.      Physical Exam  Vitals and nursing note reviewed.   Constitutional:       General: She is not in acute distress.     Appearance: She is well-developed.   HENT:      Head: Normocephalic and atraumatic.     Eyes:      Extraocular Movements: Extraocular movements intact.      Pupils: Pupils are equal, round, and reactive to light.       Cardiovascular:      Rate and Rhythm: Normal rate and regular rhythm.      Heart sounds: No murmur heard.  Pulmonary:      Effort: Pulmonary effort is normal. No respiratory distress.      Breath sounds: Normal breath sounds.   Abdominal:      General: There is no distension.      Palpations: Abdomen is soft.      Tenderness: There is no abdominal  "tenderness.     Musculoskeletal:         General: No swelling.      Cervical back: Neck supple.      Right lower leg: No edema.      Left lower leg: No edema.     Skin:     General: Skin is warm and dry.      Capillary Refill: Capillary refill takes less than 2 seconds.      Comments: Diffuse contusions over face     Neurological:      General: No focal deficit present.      Mental Status: She is alert and oriented to person, place, and time.      Cranial Nerves: No cranial nerve deficit.     Psychiatric:         Mood and Affect: Mood normal.         Behavior: Behavior normal.           Diagnostic Data       Recent labs and imaging studies were reviewed.       Code Status:      DNR    Additional notes:        Portions of the record may have been created with voice recognition software.  Occasional wrong word or \"sound a like\" substitutions may have occurred due to the inherent limitations of voice recognition software.  Read the chart carefully and recognize, using context, where substitutions have occurred.    This note was electronically signed by Dr. Adrien Richter       [1]   Past Medical History:  Diagnosis Date    Anxiety     Arthritis     Gastroparesis     History of atherosclerosis     History of breast cancer     History of osteoarthritis     Long term use of drug     Malignant neoplasm of breast (HCC) 04/11/2011    left w/mastectomy; age 85    Osteomalacia    [2]   Past Surgical History:  Procedure Laterality Date    BREAST BIOPSY Left 04/11/2011    u/s core-positive    HYSTERECTOMY  1974    age 49    MASTECTOMY Left 05/04/2011    malignant   [3]   Social History  Tobacco Use   Smoking Status Never   Smokeless Tobacco Never   [4]   Family History  Problem Relation Name Age of Onset    Coronary artery disease Mother      Coronary artery disease Family      Hyperlipidemia Family      Osteoarthritis Family      Lung cancer Sister  50   [5] No Known Allergies  [6]   Current Outpatient Medications:     " acetaminophen (TYLENOL) 325 mg tablet, Take 3 tablets (975 mg total) by mouth every 8 (eight) hours, Disp: , Rfl: 0    amLODIPine (Norvasc) 2.5 mg tablet, Take 1 tablet (2.5 mg total) by mouth daily, Disp: 90 tablet, Rfl: 1    calcium citrate-vitamin D (CITRACAL+D) 315-200 MG-UNIT per tablet, Take 1 tablet by mouth daily (Patient not taking: Reported on 5/31/2025), Disp: , Rfl:     carbidopa-levodopa (SINEMET)  mg per tablet, Take 1 tablet by mouth 3 (three) times a day, Disp: 270 tablet, Rfl: 3    cholecalciferol (VITAMIN D3) 1,000 units tablet, Take 3,000 Units by mouth in the morning., Disp: , Rfl:     escitalopram (LEXAPRO) 10 mg tablet, TAKE 1/2 TABLET BY MOUTH TWICE A DAY, Disp: 90 tablet, Rfl: 1

## 2025-06-05 NOTE — ASSESSMENT & PLAN NOTE
Patient admitted to [facility] on 5/30 after sustaining mechanical fall in [facility]  Sustained closed fracture of nasal bone and developed acute left shoulder pain  Large ecchymosis on face noted.  No FND on exam  Patient denies significant pain which is well managed on tylenol 325 mg every 8 hours   Patient discharged to post acute rehab for Physical and Occupational therapy    Continue PT/OT  Fall precautions

## 2025-06-06 ENCOUNTER — NURSING HOME VISIT (OUTPATIENT)
Dept: GERIATRICS | Facility: OTHER | Age: OVER 89
End: 2025-06-06

## 2025-06-06 DIAGNOSIS — F41.8 DEPRESSION WITH ANXIETY: Chronic | ICD-10-CM

## 2025-06-06 DIAGNOSIS — R41.89 COGNITIVE IMPAIRMENT: ICD-10-CM

## 2025-06-06 DIAGNOSIS — I10 PRIMARY HYPERTENSION: Primary | ICD-10-CM
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
## 2025-06-06 DIAGNOSIS — E55.9 VITAMIN D DEFICIENCY: ICD-10-CM

## 2025-06-06 DIAGNOSIS — G20.B1 PARKINSON'S DISEASE WITH DYSKINESIA WITHOUT FLUCTUATING MANIFESTATIONS (HCC): Chronic | ICD-10-CM

## 2025-06-07 NOTE — ASSESSMENT & PLAN NOTE
Patient admitted to [facility] on 5/30 after sustaining mechanical fall in assisted  Sustained closed fracture of nasal bone and developed acute left shoulder pain  Large ecchymosis on face noted.  No FND on exam  Patient denies significant pain which is well managed on tylenol 325 mg every 8 hours   Patient discharged to post acute rehab for Physical and Occupational therapy    Continue PT/OT  Fall precautions

## 2025-06-07 NOTE — PROGRESS NOTES
[facility]  [address]   [address]  [place], [address] [phone]    Progress Note  Code [id number]      Bri Noel  7/29/1925      Patient Location     [facility]    Reason for visit     Follow up visit for fall, hypertension, cognitive impairment, facial bruising, nasal fracture, parkinson's disease and ambulatory dysfunction    Patient’s recent vitals, labs and updated medications were reviewed on [facility] facility.     Problem List Items Addressed This Visit          Cardiovascular and Mediastinum    Primary hypertension - Primary    Maintained on amlodipine 2.5 mg daily.  Continue             Nervous and Auditory    Parkinson's disease (HCC) (Chronic)    Maintained on carbidopa-levodopa  mg TID.  Continue  Continue PT/OT for recent falls            Behavioral Health    Depression with anxiety (Chronic)    Maintained on lexapro 5 mg bid.  Continue.            Neurology/Sleep    Cognitive impairment    Patient oriented to time and place  Hx of parkinson disease  Minimize use of sedating medication including antipsychotics and antihistamines  Maintain sleep wake cycle and reorient frequently            Other    Vitamin D deficiency    Continue vitamin D3 2000 units daily          Fall    Patient admitted to [facility] on 5/30 after sustaining mechanical fall in [place]  Sustained closed fracture of nasal bone and developed acute left shoulder pain  Large ecchymosis on face noted.  No FND on exam  Patient denies significant pain which is well managed on tylenol 325 mg every 8 hours   Patient discharged to post acute rehab for Physical and Occupational therapy    Continue PT/OT  Fall precautions                  HPI         Patient seen this morning and says that she is feeling better than the last time she was seen.  Patient says she has occasional pain but is tolerable.  Bruising on her face has improved.  She denies CP/SOB.  No reports of  GI/ disturbances.            Review of Systems   Constitutional:  Negative for chills and fever.   HENT:  Negative for ear pain and sore throat.    Eyes:  Negative for pain and visual disturbance.   Respiratory:  Negative for cough and shortness of breath.    Cardiovascular:  Negative for chest pain and palpitations.   Gastrointestinal:  Negative for abdominal pain and vomiting.   Genitourinary:  Negative for dysuria and hematuria.   Musculoskeletal:         Soreness in face occasionally    Skin:  Negative for color change and rash.   Neurological:  Negative for seizures and syncope.   All other systems reviewed and are negative.      Past Medical History[1]    Past Surgical History[2]    Tobacco Use History[3]    Family History[4]     Allergies[5]    Current Medications[6]    Updated list was reviewed in [facility].     There were no vitals filed for this visit.    Physical Exam  Vitals and nursing note reviewed.   Constitutional:       General: She is not in acute distress.     Appearance: She is well-developed.   HENT:      Head: Normocephalic and atraumatic.     Eyes:      Extraocular Movements: Extraocular movements intact.      Pupils: Pupils are equal, round, and reactive to light.       Cardiovascular:      Rate and Rhythm: Normal rate and regular rhythm.      Pulses: Normal pulses.      Heart sounds: Normal heart sounds. No murmur heard.  Pulmonary:      Effort: Pulmonary effort is normal. No respiratory distress.      Breath sounds: Normal breath sounds.   Abdominal:      General: There is no distension.      Palpations: Abdomen is soft.      Tenderness: There is no abdominal tenderness.     Musculoskeletal:         General: No swelling.      Cervical back: Neck supple.      Right lower leg: No edema.      Left lower leg: No edema.      Comments: Extensive bruising on face, improved     Skin:     General: Skin is warm and dry.      Capillary Refill: Capillary refill takes less  "than 2 seconds.     Neurological:      General: No focal deficit present.      Mental Status: She is alert and oriented to person, place, and time.      Cranial Nerves: No cranial nerve deficit.     Psychiatric:         Mood and Affect: Mood normal.         Diagnostic Data:    Recent labs were reviewed.    Additional Notes:     Portions of the record may have been created with voice recognition software.  Occasional wrong word or \"sound a like\" substitutions may have occurred due to the inherent limitations of voice recognition software.  Read the chart carefully and recognize, using context, where substitutions have occurred.    This note was electronically signed by Dr. Adrien Richter         [1]   Past Medical History:  Diagnosis Date    Anxiety     Arthritis     Gastroparesis     History of atherosclerosis     History of breast cancer     History of osteoarthritis     Long term use of drug     Malignant neoplasm of breast (HCC) 04/11/2011    left w/mastectomy; age 85    Osteomalacia    [2]   Past Surgical History:  Procedure Laterality Date    BREAST BIOPSY Left 04/11/2011    u/s core-positive    HYSTERECTOMY  1974    age 49    MASTECTOMY Left 05/04/2011    malignant   [3]   Social History  Tobacco Use   Smoking Status Never   Smokeless Tobacco Never   [4]   Family History  Problem Relation Name Age of Onset    Coronary artery disease Mother      Coronary artery disease Family      Hyperlipidemia Family      Osteoarthritis Family      Lung cancer Sister  50   [5] No Known Allergies  [6]   Current Outpatient Medications:     acetaminophen (TYLENOL) 325 mg tablet, Take 3 tablets (975 mg total) by mouth every 8 (eight) hours, Disp: , Rfl: 0    amLODIPine (Norvasc) 2.5 mg tablet, Take 1 tablet (2.5 mg total) by mouth daily, Disp: 90 tablet, Rfl: 1    calcium citrate-vitamin D (CITRACAL+D) 315-200 MG-UNIT per tablet, Take 1 tablet by mouth daily (Patient not taking: Reported on 5/31/2025), Disp: , Rfl:     " carbidopa-levodopa (SINEMET)  mg per tablet, Take 1 tablet by mouth 3 (three) times a day, Disp: 270 tablet, Rfl: 3    cholecalciferol (VITAMIN D3) 1,000 units tablet, Take 3,000 Units by mouth in the morning., Disp: , Rfl:     escitalopram (LEXAPRO) 10 mg tablet, TAKE 1/2 TABLET BY MOUTH TWICE A DAY, Disp: 90 tablet, Rfl: 1

## 2025-06-12 ENCOUNTER — HOSPITAL ENCOUNTER (OUTPATIENT)
Dept: CT IMAGING | Facility: HOSPITAL | Age: OVER 89
Discharge: HOME | End: 2025-06-12
Attending: PHYSICIAN ASSISTANT
Payer: MEDICARE

## 2025-06-12 DIAGNOSIS — G93.89 PNEUMOCEPHALUS: ICD-10-CM

## 2025-06-12 PROCEDURE — 70450 CT HEAD/BRAIN W/O DYE: CPT

## 2025-06-16 ENCOUNTER — NURSING HOME VISIT (OUTPATIENT)
Dept: GERIATRICS | Facility: OTHER | Age: OVER 89
End: 2025-06-16
Payer: MEDICARE

## 2025-06-16 VITALS
DIASTOLIC BLOOD PRESSURE: 66 MMHG | RESPIRATION RATE: 18 BRPM | SYSTOLIC BLOOD PRESSURE: 146 MMHG | OXYGEN SATURATION: 96 % | WEIGHT: 116.2 LBS | TEMPERATURE: 98 F | HEART RATE: 70 BPM | BODY MASS INDEX: 18.76 KG/M2

## 2025-06-16 DIAGNOSIS — I10 PRIMARY HYPERTENSION: ICD-10-CM

## 2025-06-16 DIAGNOSIS — G93.89 PNEUMOCEPHALUS: Primary | ICD-10-CM

## 2025-06-16 DIAGNOSIS — G20.B1 PARKINSON'S DISEASE WITH DYSKINESIA WITHOUT FLUCTUATING MANIFESTATIONS (HCC): Chronic | ICD-10-CM

## 2025-06-16 DIAGNOSIS — S02.2XXA CLOSED FRACTURE OF NASAL BONE, INITIAL ENCOUNTER: ICD-10-CM

## 2025-06-16 PROCEDURE — 99309 SBSQ NF CARE MODERATE MDM 30: CPT | Performed by: INTERNAL MEDICINE

## 2025-06-16 NOTE — PROGRESS NOTES
[facility]  Donnie Seals MD [facility]  Progress Note dictated at [facility] DOS 31    NAME: Bri Noel  AGE: 89 y.o. SEX: female [phone]    DATE OF ENCOUNTER: 6/16/2025    Assessment and Plan     Problem List Items Addressed This Visit          Cardiovascular and Mediastinum    Primary hypertension    History of hypertension currently taking amlodipine 2.5 mg orally daily.  Systolic blood pressure slightly elevated.            Nervous and Auditory    Parkinson's disease (HCC) (Chronic)    Patient with history of Parkinson's more prone to orthostasis and also 2 falls secondary to retropulsion.         Pneumocephalus - Primary    This has been solved according to the CT scan that was repeated.            Musculoskeletal and Integument    Closed fracture of nasal bone    Was not deemed to be a surgical candidate.            All medications and routine orders were reviewed and updated as needed.    Plan discussed with: Patient    Chief Complaint     Chief Complaint   Patient presents with    Fall        History of Present Illness     Patient is a 89-year-old  female with history of hypertension, Parkinson's disease and a history of falls and evidence of mild cognitive impairment.  The patient was admitted to [facility] after sustaining trauma on May 30 when she had a fall at [facility].  Patient developed a cephalo-'s and facial contusion with fracture of her nasal bones.  Patient was evaluated by neurosurgery and they felt the source of her pneumocephalus was the sphenoid sinus injury.  Patient had a repeat CT scan on June 12, 2012 2025 at 1:47 PM.  Results revealed resolution of previously visualized pneumocephalus.  There was no acute intracranial abnormality with stable chronic microangiopathic changes within the brain.  Patient has been receiving therapy she tells me she is able to ambulate with assistance of a 4 wheeled walker.  The facial  contusions that she had have cleared.  She would like to return to her previous apartment.          HISTORY:  Past Surgical History[1]   Past Medical History[2]  Family History[3]  Social History[4]    Allergies:  Allergies[5]    Review of Systems     Review of Systems   Constitutional: Negative.    HENT: Negative.     Eyes: Negative.    Respiratory: Negative.     Gastrointestinal: Negative.    Endocrine: Negative.    Genitourinary: Negative.    Skin: Negative.    Neurological: Negative.    Psychiatric/Behavioral: Negative.         PHQ-2/9 Depression Screening             Medications and orders     Current Medications[6]       Objective     Vitals:   Vitals:    06/16/25 1552   BP: 146/66   Pulse: 70   Resp: 18   Temp: 98 °F (36.7 °C)   SpO2: 96%   Weight: 52.7 kg (116 lb 3.2 oz)       Physical Exam  Constitutional:       Appearance: Normal appearance.   HENT:      Head: Atraumatic.      Right Ear: External ear normal.      Left Ear: External ear normal.      Nose: Nose normal.     Eyes:      Conjunctiva/sclera: Conjunctivae normal.      Pupils: Pupils are equal, round, and reactive to light.       Cardiovascular:      Rate and Rhythm: Normal rate and regular rhythm.   Pulmonary:      Effort: Pulmonary effort is normal.      Breath sounds: Normal breath sounds.   Abdominal:      Palpations: Abdomen is soft.     Musculoskeletal:         General: Normal range of motion.      Cervical back: Neck supple.     Skin:     General: Skin is dry.     Neurological:      Mental Status: She is oriented to person, place, and time. Mental status is at baseline.     Psychiatric:         Mood and Affect: Mood normal.         Behavior: Behavior normal.         Thought Content: Thought content normal.         Judgment: Judgment normal.         Pertinent Laboratory/Diagnostic Studies:   The following labs/studies were reviewed please see facility chart for details.                   [1]   Past Surgical History:  Procedure Laterality Date     BREAST BIOPSY Left 04/11/2011    u/s core-positive    HYSTERECTOMY  1974    age 49    MASTECTOMY Left 05/04/2011    malignant   [2]   Past Medical History:  Diagnosis Date    Anxiety     Arthritis     Gastroparesis     History of atherosclerosis     History of breast cancer     History of osteoarthritis     Long term use of drug     Malignant neoplasm of breast (HCC) 04/11/2011    left w/mastectomy; age 85    Osteomalacia    [3]   Family History  Problem Relation Name Age of Onset    Coronary artery disease Mother      Coronary artery disease Family      Hyperlipidemia Family      Osteoarthritis Family      Lung cancer Sister  50   [4]   Social History  Socioeconomic History    Marital status:    Tobacco Use    Smoking status: Never    Smokeless tobacco: Never   Vaping Use    Vaping status: Never Used   Substance and Sexual Activity    Alcohol use: Not Currently     Comment: Social drinker    Drug use: No    Sexual activity: Not Currently     Social Drivers of Health     Financial Resource Strain: Low Risk  (10/20/2023)    Overall Financial Resource Strain (CARDIA)     Difficulty of Paying Living Expenses: Not hard at all   Food Insecurity: No Food Insecurity (5/31/2025)    Nursing - Inadequate Food Risk Classification     Ran Out of Food in the Last Year: Never true   Transportation Needs: No Transportation Needs (5/31/2025)    Nursing - Transportation Risk Classification     Lack of Transportation: No   Intimate Partner Violence: Unknown (5/31/2025)    Nursing IPS     Physically Hurt by Someone: No     Hurt or Threatened by Someone: No   Housing Stability: Unknown (5/31/2025)    Nursing: Inadequate Housing Risk Classification     Unable to Pay for Housing in the Last Year: No     Has Housing: No   [5] No Known Allergies  [6]   Current Outpatient Medications:     acetaminophen (TYLENOL) 325 mg tablet, Take 3 tablets (975 mg total) by mouth every 8 (eight) hours, Disp: , Rfl: 0    amLODIPine (Norvasc) 2.5  mg tablet, Take 1 tablet (2.5 mg total) by mouth daily, Disp: 90 tablet, Rfl: 1    calcium citrate-vitamin D (CITRACAL+D) 315-200 MG-UNIT per tablet, Take 1 tablet by mouth daily (Patient not taking: Reported on 5/31/2025), Disp: , Rfl:     carbidopa-levodopa (SINEMET)  mg per tablet, Take 1 tablet by mouth 3 (three) times a day, Disp: 270 tablet, Rfl: 3    cholecalciferol (VITAMIN D3) 1,000 units tablet, Take 3,000 Units by mouth in the morning., Disp: , Rfl:     escitalopram (LEXAPRO) 10 mg tablet, TAKE 1/2 TABLET BY MOUTH TWICE A DAY, Disp: 90 tablet, Rfl: 1

## 2025-06-16 NOTE — ASSESSMENT & PLAN NOTE
History of hypertension currently taking amlodipine 2.5 mg orally daily.  Systolic blood pressure slightly elevated.

## 2025-06-16 NOTE — ASSESSMENT & PLAN NOTE
Patient with history of Parkinson's more prone to orthostasis and also 2 falls secondary to retropulsion.

## 2025-06-19 ENCOUNTER — OFFICE VISIT (OUTPATIENT)
Dept: NEUROSURGERY | Facility: CLINIC | Age: OVER 89
End: 2025-06-19
Payer: MEDICARE

## 2025-06-19 VITALS
WEIGHT: 123 LBS | BODY MASS INDEX: 19.77 KG/M2 | DIASTOLIC BLOOD PRESSURE: 84 MMHG | HEIGHT: 66 IN | TEMPERATURE: 97.9 F | HEART RATE: 76 BPM | SYSTOLIC BLOOD PRESSURE: 110 MMHG

## 2025-06-19 DIAGNOSIS — G93.89: Primary | ICD-10-CM

## 2025-06-19 PROCEDURE — 99213 OFFICE O/P EST LOW 20 MIN: CPT | Performed by: PHYSICIAN ASSISTANT

## 2025-06-19 RX ORDER — LIDOCAINE 40 MG/G
CREAM TOPICAL DAILY PRN
COMMUNITY

## 2025-06-19 RX ORDER — BUSPIRONE HYDROCHLORIDE 5 MG/1
5 TABLET ORAL 3 TIMES DAILY
COMMUNITY

## 2025-06-19 NOTE — PROGRESS NOTES
Name: Bri Noel      : 1925      MRN: [id number]  Encounter Provider: Jose Garza PA-C  Encounter Date: 2025   Encounter department: [facility] [place]  :  Assessment & Plan  Traumatic pneumocephalus  Patient is a 89 years old present woman with past medical history of hypertension, GERD, gastroparesis, Parkinson's disease, arthritis, cirrhosis, anemia, breast cancer, depression, anxiety, hyperlipidemia, vitamin D deficiency and atraumatic pneumocephalus.  History of fall.    Patient is here today for 3 to 4 weeks follow-up of traumatic multiple small pneumocephalus following fall.  No intracranial hemorrhage.    Patient had CT head which demonstrated complete remission of presumed multiple tiny hypodensities signals likely pneumocephalus.  Patient reported doing fine, denies any headache, vision issues, nausea, vomiting, speech or swallowing problem.  No seizures, weakness, numbness or paresthesias extremities.  Baseline gait issues uses walker for ambulation.    Exam-patient alert and oriented x 3, PERRL, EOMI x 3 mm conjugate bilaterally, finger-to-nose test normal and without drift bilaterally.  Strength 5/5 and sensation to light touch intact throughout.  DTR 2+ no clonus bilaterally     Imagings:    CT head demonstrates complete resolution of previously noted multiple tiny pneumocephalus.    Plan:    I reviewed image with the patient and her daughter, plan discussed.  Image shows complete/previously multiple tiny pneumocephalus.  Upon neurosurgery perspective no procedure or regular follow-up is required.  Advised patient fall precaution.  Call with question or concern otherwise follow-up on as needed basis.      History of Present Illness     Bri Noel is a 89 y.o. female here today for 2 weeks follow-up of traumatic head injury/pneumocephalus    HPI   See discussion above  Review of Systems   Constitutional: Negative.    HENT: Negative.     Eyes: Negative.   "  Respiratory: Negative.     Cardiovascular: Negative.    Gastrointestinal: Negative.    Endocrine: Negative.    Genitourinary: Negative.    Musculoskeletal: Negative.    Skin: Negative.    Allergic/Immunologic: Negative.    Neurological: Negative.         2 WK HFU FOR PNEUMOCEPHALUS AND POSSIBLE SPHENOID  CTH on 6/12/25      Hematological: Negative.    Psychiatric/Behavioral: Negative.     All other systems reviewed and are negative.    I have personally reviewed the MA's review of systems and made changes as necessary.    Past Medical History   Past Medical History[1]  Past Surgical History[2]  Family History[3]  she reports that she has never smoked. She has never used smokeless tobacco. She reports that she does not currently use alcohol. She reports that she does not use drugs.  Current Outpatient Medications   Medication Instructions    acetaminophen (TYLENOL) 975 mg, Oral, Every 8 hours scheduled    amLODIPine (NORVASC) 2.5 mg, Oral, Daily    calcium citrate-vitamin D (CITRACAL+D) 315-200 MG-UNIT per tablet 1 tablet, Daily    carbidopa-levodopa (SINEMET)  mg per tablet 1 tablet, Oral, 3 times daily    cholecalciferol (VITAMIN D3) 3,000 Units, Oral, Daily    escitalopram (LEXAPRO) 5 mg, Oral, 2 times daily   Allergies[4]   Objective   /84   Pulse 76   Temp 97.9 °F (36.6 °C) (Temporal)   Ht 5' 6\" (1.676 m)   Wt 55.8 kg (123 lb)   LMP  (LMP Unknown)   BMI 19.85 kg/m²     Physical Exam  Constitutional:       Appearance: Normal appearance.   HENT:      Head: Normocephalic and atraumatic.     Eyes:      Extraocular Movements: Extraocular movements intact.      Pupils: Pupils are equal, round, and reactive to light.     Pulmonary:      Effort: Pulmonary effort is normal.     Musculoskeletal:         General: Normal range of motion.      Cervical back: Normal range of motion.     Neurological:      General: No focal deficit present.      Mental Status: She is alert and oriented to person, place, and " time.      Motor: Motor strength is normal.     Deep Tendon Reflexes:      Reflex Scores:       Tricep reflexes are 2+ on the right side and 2+ on the left side.       Bicep reflexes are 2+ on the right side and 2+ on the left side.       Brachioradialis reflexes are 2+ on the right side and 2+ on the left side.       Patellar reflexes are 2+ on the right side and 2+ on the left side.       Achilles reflexes are 2+ on the right side and 2+ on the left side.      Neurological Exam  Mental Status  Alert. Oriented to person, place, and time.    Cranial Nerves  CN III, IV, VI: Extraocular movements intact bilaterally. Pupils equal round and reactive to light bilaterally.    Motor  Normal muscle bulk throughout. No fasciculations present. Normal muscle tone. No abnormal involuntary movements. Strength is 5/5 throughout all four extremities.    Sensory  Light touch is normal in upper and lower extremities.     Reflexes                                            Right                      Left  Brachioradialis                    2+                         2+  Biceps                                 2+                         2+  Triceps                                2+                         2+  Finger flex                           2+                         2+  Hamstring                            2+                         2+  Patellar                                2+                         2+  Achilles                                2+                         2+    Coordination  Right: Finger-to-nose normal.Left: Finger-to-nose normal.    Gait  Casual gait:      Radiology Results Review: I personally reviewed the following image studies in PACS and associated radiology reports: CT head. My interpretation of the radiology images/reports is: See discussion above.         [1]   Past Medical History:  Diagnosis Date    Anxiety     Arthritis     Gastroparesis     History of atherosclerosis     History of breast cancer      History of osteoarthritis     Long term use of drug     Malignant neoplasm of breast (HCC) 04/11/2011    left w/mastectomy; age 85    Osteomalacia    [2]   Past Surgical History:  Procedure Laterality Date    BREAST BIOPSY Left 04/11/2011    u/s core-positive    HYSTERECTOMY  1974    age 49    MASTECTOMY Left 05/04/2011    malignant   [3]   Family History  Problem Relation Name Age of Onset    Coronary artery disease Mother      Coronary artery disease Family      Hyperlipidemia Family      Osteoarthritis Family      Lung cancer Sister  50   [4] No Known Allergies

## 2025-06-20 ENCOUNTER — NURSING HOME VISIT (OUTPATIENT)
Dept: GERIATRICS | Facility: OTHER | Age: OVER 89
End: 2025-06-20
Payer: MEDICARE

## 2025-06-20 ENCOUNTER — TELEPHONE (OUTPATIENT)
Age: OVER 89
End: 2025-06-20

## 2025-06-20 VITALS
WEIGHT: 114.6 LBS | DIASTOLIC BLOOD PRESSURE: 79 MMHG | OXYGEN SATURATION: 95 % | TEMPERATURE: 97.4 F | HEART RATE: 92 BPM | RESPIRATION RATE: 18 BRPM | SYSTOLIC BLOOD PRESSURE: 155 MMHG | BODY MASS INDEX: 18.5 KG/M2

## 2025-06-20 DIAGNOSIS — I10 PRIMARY HYPERTENSION: ICD-10-CM

## 2025-06-20 DIAGNOSIS — G47.52 REM BEHAVIORAL DISORDER: ICD-10-CM

## 2025-06-20 DIAGNOSIS — F41.8 DEPRESSION WITH ANXIETY: Chronic | ICD-10-CM

## 2025-06-20 DIAGNOSIS — G20.B1 PARKINSON'S DISEASE WITH DYSKINESIA WITHOUT FLUCTUATING MANIFESTATIONS (HCC): Primary | Chronic | ICD-10-CM

## 2025-06-20 DIAGNOSIS — K21.9 GASTROESOPHAGEAL REFLUX DISEASE WITHOUT ESOPHAGITIS: Chronic | ICD-10-CM

## 2025-06-20 DIAGNOSIS — S02.2XXA CLOSED FRACTURE OF NASAL BONE, INITIAL ENCOUNTER: ICD-10-CM

## 2025-06-20 PROCEDURE — 99309 SBSQ NF CARE MODERATE MDM 30: CPT | Performed by: INTERNAL MEDICINE

## 2025-06-20 NOTE — ASSESSMENT & PLAN NOTE
Patient taking currently 2.5 mg of amlodipine.  I did not raise her antihypertensive agent because of her history of orthostasis and falls.  Will continue to monitor if blood pressure should remain in the 150 range may consider increasing to 5 mg at that time.

## 2025-06-20 NOTE — ASSESSMENT & PLAN NOTE
Ambulates with rolling walker.  She also feels comfortable using wheelchair.  She will be followed by therapy when she goes to personal care.

## 2025-06-20 NOTE — PROGRESS NOTES
[facility]  Donnie Seals MD FACP-AGSF  Progress Note dictated at [facility] POS [facility]    NAME: Bri Noel  AGE: 89 y.o. SEX: female [phone]    DATE OF ENCOUNTER: 6/20/2025    Assessment and Plan     Problem List Items Addressed This Visit          Cardiovascular and Mediastinum    Primary hypertension    Patient taking currently 2.5 mg of amlodipine.  I did not raise her antihypertensive agent because of her history of orthostasis and falls.  Will continue to monitor if blood pressure should remain in the 150 range may consider increasing to 5 mg at that time.            Digestive    Esophageal reflux (Chronic)    Patient had no symptoms of reflux.            Nervous and Auditory    Parkinson's disease (HCC) - Primary (Chronic)    Ambulates with rolling walker.  She also feels comfortable using wheelchair.  She will be followed by therapy when she goes to personal care.            Musculoskeletal and Integument    Closed fracture of nasal bone       Behavioral Health    Depression with anxiety (Chronic)    Patient currently taking Lexapro 5 mg orally daily.            Neurology/Sleep    REM behavioral disorder    Rem sleep behavior is often seen in patients with Parkinson's.            All medications and routine orders were reviewed and updated as needed.    Plan discussed with: Patient    Chief Complaint     Chief Complaint   Patient presents with    Fall        History of Present Illness     Patient is a 89-year-old  female who has been followed for hypertension, GERD, gastroparesis, Parkinson's disease, anemia, history of breast cancer, depression, hyperlipidemia, vitamin D deficiency patient had fallen and developed an atraumatic pneumocephalus.  Patient has done well in rehabilitation she does have a tendency to fall backwards because of her underlying parkinsonism-orthostasis.  Last time she fell forward suffering a fracture of her nasal bones.   Most recent CT scan on June 12 reveals evidence of clearance of the pneumocephalus.  Patient would like to return to her personal care setting.  She will continue with physical and Occupational Therapy.  I have spoken with daughter and she will be discharged from the skilled care area today.  Her paperwork has been filled.  I do notice that some of her systolics are in the 150 range I was hesitant to increase her antihypertensive agent because of her history of orthostasis.  We will just need to monitor on an ongoing basis.          HISTORY:  Past Surgical History[1]   Past Medical History[2]  Family History[3]  Social History[4]    Allergies:  Allergies[5]    Review of Systems     Review of Systems   Constitutional: Negative.    HENT: Negative.     Eyes:  Positive for visual disturbance.   Respiratory: Negative.     Cardiovascular: Negative.    Gastrointestinal: Negative.    Endocrine: Negative.    Genitourinary: Negative.    Musculoskeletal:  Positive for arthralgias and gait problem.   Skin: Negative.    Allergic/Immunologic: Negative.    Psychiatric/Behavioral:  Positive for decreased concentration.        PHQ-2/9 Depression Screening             Medications and orders     Current Medications[6]       Objective     Vitals:   Vitals:    06/20/25 1421   BP: 155/79   Pulse: 92   Resp: 18   Temp: (!) 97.4 °F (36.3 °C)   SpO2: 95%   Weight: 52 kg (114 lb 9.6 oz)       Physical Exam  Constitutional:       Appearance: Normal appearance.   HENT:      Head: Atraumatic.      Right Ear: External ear normal.      Left Ear: External ear normal.      Mouth/Throat:      Mouth: Mucous membranes are dry.     Eyes:      Conjunctiva/sclera: Conjunctivae normal.      Pupils: Pupils are equal, round, and reactive to light.       Cardiovascular:      Rate and Rhythm: Normal rate and regular rhythm.      Pulses: Normal pulses.      Heart sounds: Normal heart sounds.   Pulmonary:      Effort: Pulmonary effort is normal.      Breath  sounds: Normal breath sounds.   Abdominal:      Palpations: Abdomen is soft.     Musculoskeletal:         General: Normal range of motion.      Cervical back: Neck supple.     Skin:     General: Skin is dry.     Neurological:      General: No focal deficit present.      Mental Status: She is alert and oriented to person, place, and time.     Psychiatric:         Mood and Affect: Mood normal.         Behavior: Behavior normal.         Thought Content: Thought content normal.         Pertinent Laboratory/Diagnostic Studies:   The following labs/studies were reviewed please see facility chart for details.                   [1]   Past Surgical History:  Procedure Laterality Date    BREAST BIOPSY Left 04/11/2011    u/s core-positive    HYSTERECTOMY  1974    age 49    MASTECTOMY Left 05/04/2011    malignant   [2]   Past Medical History:  Diagnosis Date    Anxiety     Arthritis     Gastroparesis     History of atherosclerosis     History of breast cancer     History of osteoarthritis     Long term use of drug     Malignant neoplasm of breast (HCC) 04/11/2011    left w/mastectomy; age 85    Osteomalacia    [3]   Family History  Problem Relation Name Age of Onset    Coronary artery disease Mother      Coronary artery disease Family      Hyperlipidemia Family      Osteoarthritis Family      Lung cancer Sister  50   [4]   Social History  Socioeconomic History    Marital status:    Tobacco Use    Smoking status: Never    Smokeless tobacco: Never   Vaping Use    Vaping status: Never Used   Substance and Sexual Activity    Alcohol use: Not Currently     Comment: Social drinker    Drug use: No    Sexual activity: Not Currently     Social Drivers of Health     Financial Resource Strain: Low Risk  (10/20/2023)    Overall Financial Resource Strain (CARDIA)     Difficulty of Paying Living Expenses: Not hard at all   Food Insecurity: No Food Insecurity (5/31/2025)    Nursing - Inadequate Food Risk Classification     Ran Out of  Food in the Last Year: Never true   Transportation Needs: No Transportation Needs (5/31/2025)    Nursing - Transportation Risk Classification     Lack of Transportation: No   Intimate Partner Violence: Unknown (5/31/2025)    Nursing IPS     Physically Hurt by Someone: No     Hurt or Threatened by Someone: No   Housing Stability: Unknown (5/31/2025)    Nursing: Inadequate Housing Risk Classification     Unable to Pay for Housing in the Last Year: No     Has Housing: No   [5] No Known Allergies  [6]   Current Outpatient Medications:     acetaminophen (TYLENOL) 325 mg tablet, Take 3 tablets (975 mg total) by mouth every 8 (eight) hours, Disp: , Rfl: 0    amLODIPine (Norvasc) 2.5 mg tablet, Take 1 tablet (2.5 mg total) by mouth daily, Disp: 90 tablet, Rfl: 1    busPIRone (BUSPAR) 5 mg tablet, Take 5 mg by mouth 3 (three) times a day, Disp: , Rfl:     calcium citrate-vitamin D (CITRACAL+D) 315-200 MG-UNIT per tablet, Take 1 tablet by mouth daily, Disp: , Rfl:     carbidopa-levodopa (SINEMET)  mg per tablet, Take 1 tablet by mouth 3 (three) times a day, Disp: 270 tablet, Rfl: 3    cholecalciferol (VITAMIN D3) 1,000 units tablet, Take 3,000 Units by mouth in the morning., Disp: , Rfl:     escitalopram (LEXAPRO) 10 mg tablet, TAKE 1/2 TABLET BY MOUTH TWICE A DAY, Disp: 90 tablet, Rfl: 1    lidocaine 4 %, Apply topically daily as needed for mild pain, Disp: , Rfl:     magnesium hydroxide (MILK OF MAGNESIA) 400 mg/5 mL oral suspension, Take by mouth daily as needed for constipation, Disp: , Rfl:

## 2025-06-20 NOTE — TELEPHONE ENCOUNTER
----- Message from Brenda Su MD sent at 2/10/2017 12:06 PM CST -----  Please verify if pt has been taking her metformin or if she is just starting.  i need to increase it if she has been taking it consistently with a 10 hgb a1c   Request for Admission note dated 6/4/2025 was emailed to [email or web address]

## 2025-06-20 NOTE — TELEPHONE ENCOUNTER
Zara from [facility] needs Nursing home Visit emailed to her from 6/4 for patients records.   Email provided is [email or web address]